# Patient Record
Sex: FEMALE | Race: WHITE | NOT HISPANIC OR LATINO | Employment: FULL TIME | URBAN - METROPOLITAN AREA
[De-identification: names, ages, dates, MRNs, and addresses within clinical notes are randomized per-mention and may not be internally consistent; named-entity substitution may affect disease eponyms.]

---

## 2018-04-16 ENCOUNTER — OFFICE VISIT (OUTPATIENT)
Dept: URGENT CARE | Facility: CLINIC | Age: 28
End: 2018-04-16
Payer: COMMERCIAL

## 2018-04-16 VITALS
HEART RATE: 88 BPM | RESPIRATION RATE: 14 BRPM | HEIGHT: 65 IN | WEIGHT: 215 LBS | BODY MASS INDEX: 35.82 KG/M2 | TEMPERATURE: 98.7 F | OXYGEN SATURATION: 98 % | DIASTOLIC BLOOD PRESSURE: 74 MMHG | SYSTOLIC BLOOD PRESSURE: 126 MMHG

## 2018-04-16 DIAGNOSIS — R10.30 LOWER ABDOMINAL PAIN: ICD-10-CM

## 2018-04-16 DIAGNOSIS — K62.89 RECTAL PAIN: ICD-10-CM

## 2018-04-16 PROCEDURE — 99204 OFFICE O/P NEW MOD 45 MIN: CPT | Performed by: NURSE PRACTITIONER

## 2018-04-16 RX ORDER — SPIRONOLACTONE 25 MG/1
25 TABLET ORAL DAILY
COMMUNITY
End: 2018-08-08

## 2018-04-16 ASSESSMENT — ENCOUNTER SYMPTOMS
FEVER: 0
NAUSEA: 0
ABDOMINAL PAIN: 1
VOMITING: 0
DIARRHEA: 1

## 2018-04-16 NOTE — PROGRESS NOTES
Subjective:      Farideh Cunha is a 27 y.o. female who presents with Abdominal Pain (lower abdominal pain , nausea , diarrhea since this a.m.)    History reviewed. No pertinent past medical history.  Social History     Social History   • Marital status:      Spouse name: N/A   • Number of children: N/A   • Years of education: N/A     Occupational History   • Not on file.     Social History Main Topics   • Smoking status: Never Smoker   • Smokeless tobacco: Never Used   • Alcohol use Not on file   • Drug use: Unknown   • Sexual activity: Not on file     Other Topics Concern   • Not on file     Social History Narrative   • No narrative on file     History reviewed. No pertinent family history.    Allergies: Patient has no allergy information on record.    Patient is a 27-year-old female who presents today with complaint of sudden onset of lower abdominal pain and sharp rectal pain earlier today. Patient was straining to have a bowel movement and states she had sudden sharp pain in the rectal area. She states this pain was a 7/10. Following this she began to have lower abdominal pain that has been consistent since the onset. She does state her pain is now 4/10 and seems to have improved. She denies any nausea or vomiting with this. Patient states that she started on [employer] a few months ago and since then has been having some difficulty with chronic constipation. When she had a bowel movement earlier today, she states she was actually having diarrhea at that time.            Abdominal Pain   This is a new problem. The current episode started today. The onset quality is sudden. The problem occurs constantly. The problem has been gradually improving. Pain location: lower abdomen, rectum. The pain is moderate. The quality of the pain is aching and sharp. The abdominal pain does not radiate. Associated symptoms include diarrhea. Pertinent negatives include no fever, nausea or vomiting. Nothing aggravates the  "pain. The pain is relieved by nothing.       Review of Systems   Constitutional: Negative for fever.   Gastrointestinal: Positive for abdominal pain and diarrhea. Negative for nausea and vomiting.   All other systems reviewed and are negative.         Objective:     /74   Pulse 88   Temp 37.1 °C (98.7 °F)   Resp 14   Ht 1.651 m (5' 5\")   Wt 97.5 kg (215 lb)   LMP 03/20/2018   SpO2 98%   Breastfeeding? No   BMI 35.78 kg/m²      Physical Exam   Constitutional: She is oriented to person, place, and time. She appears well-developed and well-nourished.   Cardiovascular: Normal rate and regular rhythm.    Pulmonary/Chest: Breath sounds normal.   Abdominal: Soft. She exhibits no distension and no mass. There is tenderness. There is no rebound and no guarding. No hernia.   Diffuse tenderness across the lower abdomen, no guarding, no rebound tenderness. Abdomen is soft.   Genitourinary:   Genitourinary Comments: No external hemorrhoids noted no rectal prolapse noted, no fissures or bleeding.   Musculoskeletal: Normal range of motion.   Neurological: She is oriented to person, place, and time.   Skin: Skin is warm and dry. Capillary refill takes less than 2 seconds.   Psychiatric: She has a normal mood and affect. Her behavior is normal. Judgment and thought content normal.     UA: trace leukocytes, negative blood, negative nitrates    Urine hCG: negative           Assessment/Plan:   Lower abdominal pain  Rectal pain   -Patient declined CT and labs at this time; states as she is feeling better she would like to monitor symptoms at home at this point. States she will return to urgent care or go to the emergency room after hours if her symptoms escalate again  -Patient states that she will follow up with me tomorrow morning by phone.   There are no diagnoses linked to this encounter.      "

## 2018-04-17 ENCOUNTER — APPOINTMENT (RX ONLY)
Dept: URBAN - METROPOLITAN AREA CLINIC 4 | Facility: CLINIC | Age: 28
Setting detail: DERMATOLOGY
End: 2018-04-17

## 2018-04-17 DIAGNOSIS — L70.0 ACNE VULGARIS: ICD-10-CM

## 2018-04-17 DIAGNOSIS — L21.8 OTHER SEBORRHEIC DERMATITIS: ICD-10-CM

## 2018-04-17 DIAGNOSIS — L65.0 TELOGEN EFFLUVIUM: ICD-10-CM

## 2018-04-17 PROCEDURE — ? PRESCRIPTION

## 2018-04-17 PROCEDURE — ? TREATMENT REGIMEN

## 2018-04-17 PROCEDURE — 99202 OFFICE O/P NEW SF 15 MIN: CPT

## 2018-04-17 PROCEDURE — ? ADDITIONAL NOTES

## 2018-04-17 PROCEDURE — ? COUNSELING

## 2018-04-17 RX ORDER — TRETINOIN 0.25 MG/G
CREAM TOPICAL QHS
Qty: 45 | Refills: 3 | Status: ERX | COMMUNITY
Start: 2018-04-17

## 2018-04-17 RX ORDER — SPIRONOLACTONE 50 MG/1
TABLET, FILM COATED ORAL
Qty: 60 | Refills: 1 | Status: ERX | COMMUNITY
Start: 2018-04-17

## 2018-04-17 RX ORDER — CLINDAMYCIN PHOSPHATE 10 MG/ML
SOLUTION TOPICAL
Qty: 60 | Refills: 6 | Status: ERX | COMMUNITY
Start: 2018-04-17

## 2018-04-17 RX ADMIN — CLINDAMYCIN PHOSPHATE: 10 SOLUTION TOPICAL at 16:35

## 2018-04-17 RX ADMIN — SPIRONOLACTONE: 50 TABLET, FILM COATED ORAL at 16:36

## 2018-04-17 RX ADMIN — TRETINOIN: 0.25 CREAM TOPICAL at 16:35

## 2018-04-17 ASSESSMENT — LOCATION SIMPLE DESCRIPTION DERM
LOCATION SIMPLE: POSTERIOR SCALP
LOCATION SIMPLE: SUPERIOR FOREHEAD
LOCATION SIMPLE: LEFT TEMPLE
LOCATION SIMPLE: SCALP
LOCATION SIMPLE: LEFT SCALP
LOCATION SIMPLE: INFERIOR FOREHEAD
LOCATION SIMPLE: RIGHT CHEEK
LOCATION SIMPLE: LEFT CHEEK
LOCATION SIMPLE: RIGHT FOREHEAD
LOCATION SIMPLE: RIGHT TEMPLE

## 2018-04-17 ASSESSMENT — LOCATION DETAILED DESCRIPTION DERM
LOCATION DETAILED: LEFT MEDIAL FRONTAL SCALP
LOCATION DETAILED: MID-OCCIPITAL SCALP
LOCATION DETAILED: SUPERIOR MID FOREHEAD
LOCATION DETAILED: LEFT CENTRAL PARIETAL SCALP
LOCATION DETAILED: RIGHT CENTRAL PARIETAL SCALP
LOCATION DETAILED: LEFT SUPERIOR CENTRAL BUCCAL CHEEK
LOCATION DETAILED: RIGHT SUPERIOR TEMPLE
LOCATION DETAILED: LEFT CENTRAL BUCCAL CHEEK
LOCATION DETAILED: LEFT SUPERIOR TEMPLE
LOCATION DETAILED: INFERIOR MID FOREHEAD
LOCATION DETAILED: RIGHT CENTRAL BUCCAL CHEEK
LOCATION DETAILED: RIGHT INFERIOR LATERAL FOREHEAD

## 2018-04-17 ASSESSMENT — LOCATION ZONE DERM
LOCATION ZONE: SCALP
LOCATION ZONE: FACE

## 2018-04-17 NOTE — HPI: SECONDARY COMPLAINT
How Severe Is This Condition?: mild
Additional History: Patient is using ketaconozole shampoo and fluocinonide solution which was given by PCP. Patient states PCP had ordered labs and patient states everything came back normal. Patient also states she has stopped birth control in December and lost 40 lbs as well. Patient is in for further evaluation and management.

## 2018-04-17 NOTE — PROCEDURE: TREATMENT REGIMEN
Notification: Please note that there are new Treatment Regimen plans which have an enhanced patient education handout experience.  The plans are called Treatment Regimen (Acne), Treatment Regimen (Atopic Dermatitis), Treatment Regimen (Rosacea), Treatment Regimen (Sun Protection), Treatment Regimen (Cosmetic Products), and Treatment Regimen (Xerosis).
Initiate Treatment: Wash affected areas with Benzoyl Peroxide each morning, leave on for 2-5 minutes then rinse. Apply topical Clindamycin solution and let dry. Apply daily non-comedogenic, spf moisturizer containing zinc.\\nWash face with gentle Cetaphil cleanser each night. Apply 1/2 pea sized amount of tretinoin 0.05% mixed with nightly non comedogenic moisturizer to entire face.
Detail Level: Zone
Continue Regimen: Spironolactone

## 2018-04-17 NOTE — HPI: PIMPLES (NODULAR ACNE)
How Severe Is Your Nodular Acne?: moderate
Is This A New Presentation, Or A Follow-Up?: Acne
Additional History: Patient was on BC but did not have any results for acne. On Spironolactone 25mg once daily.
Females Only: When Was Your Last Menstrual Period?: 3/23/2018

## 2018-04-17 NOTE — PROCEDURE: ADDITIONAL NOTES
Additional Notes: Patient advised to continue use of ketoconazole shampoo. \\nPatient advised to contact our office or PCP office if seborrheic dermatitis worsens.
Additional Notes: Will obtain labs from [facility]. \\nAdvised patient to reduce mechanical stress on hair.\\nPatient advised to use OTC biotin, may continue nioxin shampoo.
Additional Notes: Side effects of spironolactone reviewed in detail. \\nAdvised patient of the birth defects if patient were to get pregnant. Patient denies current pregnancy or plans for pregnancy. \\nWill obtain lab work from [facility]\\nDiscussed possible accutane or oral antibiotics if patient not happy with results in 2 months. \\nPatient advised to contact our office with any questions and/ or concerns. \\nWill have patient RTC in 2 months for acne recheck.

## 2018-04-17 NOTE — PROCEDURE: COUNSELING
Detail Level: Detailed
Topical Retinoid Pregnancy And Lactation Text: This medication is Pregnancy Category C. It is unknown if this medication is excreted in breast milk.
Tazorac Pregnancy And Lactation Text: This medication is not safe during pregnancy. It is unknown if this medication is excreted in breast milk.
Topical Sulfur Applications Pregnancy And Lactation Text: This medication is Pregnancy Category C and has an unknown safety profile during pregnancy. It is unknown if this topical medication is excreted in breast milk.
Detail Level: Zone
Include Pregnancy/Lactation Warning?: No
Birth Control Pills Pregnancy And Lactation Text: This medication should be avoided if pregnant and for the first 30 days post-partum.
High Dose Vitamin A Counseling: Side effects reviewed, pt to contact office should one occur.
Topical Clindamycin Pregnancy And Lactation Text: This medication is Pregnancy Category B and is considered safe during pregnancy. It is unknown if it is excreted in breast milk.
Benzoyl Peroxide Counseling: Patient counseled that medicine may cause skin irritation and bleach clothing.  In the event of skin irritation, the patient was advised to reduce the amount of the drug applied or use it less frequently.   The patient verbalized understanding of the proper use and possible adverse effects of benzoyl peroxide.  All of the patient's questions and concerns were addressed.
Minocycline Counseling: Patient advised regarding possible photosensitivity and discoloration of the teeth, skin, lips, tongue and gums.  Patient instructed to avoid sunlight, if possible.  When exposed to sunlight, patients should wear protective clothing, sunglasses, and sunscreen.  The patient was instructed to call the office immediately if the following severe adverse effects occur:  hearing changes, easy bruising/bleeding, severe headache, or vision changes.  The patient verbalized understanding of the proper use and possible adverse effects of minocycline.  All of the patient's questions and concerns were addressed.
Dapsone Pregnancy And Lactation Text: This medication is Pregnancy Category C and is not considered safe during pregnancy or breast feeding.
Tetracycline Pregnancy And Lactation Text: This medication is Pregnancy Category D and not consider safe during pregnancy. It is also excreted in breast milk.
Tetracycline Counseling: Patient counseled regarding possible photosensitivity and increased risk for sunburn.  Patient instructed to avoid sunlight, if possible.  When exposed to sunlight, patients should wear protective clothing, sunglasses, and sunscreen.  The patient was instructed to call the office immediately if the following severe adverse effects occur:  hearing changes, easy bruising/bleeding, severe headache, or vision changes.  The patient verbalized understanding of the proper use and possible adverse effects of tetracycline.  All of the patient's questions and concerns were addressed. Patient understands to avoid pregnancy while on therapy due to potential birth defects.
Erythromycin Pregnancy And Lactation Text: This medication is Pregnancy Category B and is considered safe during pregnancy. It is also excreted in breast milk.
Erythromycin Counseling:  I discussed with the patient the risks of erythromycin including but not limited to GI upset, allergic reaction, drug rash, diarrhea, increase in liver enzymes, and yeast infections.
Isotretinoin Pregnancy And Lactation Text: This medication is Pregnancy Category X and is considered extremely dangerous during pregnancy. It is unknown if it is excreted in breast milk.
Doxycycline Pregnancy And Lactation Text: This medication is Pregnancy Category D and not consider safe during pregnancy. It is also excreted in breast milk but is considered safe for shorter treatment courses.
Topical Retinoid counseling:  Patient advised to apply a pea-sized amount only at bedtime and wait 30 minutes after washing their face before applying.  If too drying, patient may add a non-comedogenic moisturizer. The patient verbalized understanding of the proper use and possible adverse effects of retinoids.  All of the patient's questions and concerns were addressed.
Bactrim Counseling:  I discussed with the patient the risks of sulfa antibiotics including but not limited to GI upset, allergic reaction, drug rash, diarrhea, dizziness, photosensitivity, and yeast infections.  Rarely, more serious reactions can occur including but not limited to aplastic anemia, agranulocytosis, methemoglobinemia, blood dyscrasias, liver or kidney failure, lung infiltrates or desquamative/blistering drug rashes.
High Dose Vitamin A Pregnancy And Lactation Text: High dose vitamin A therapy is contraindicated during pregnancy and breast feeding.
Spironolactone Pregnancy And Lactation Text: This medication can cause feminization of the male fetus and should be avoided during pregnancy. The active metabolite is also found in breast milk.
Spironolactone Counseling: Patient advised regarding risks of diarrhea, abdominal pain, hyperkalemia, birth defects (for female patients), liver toxicity and renal toxicity. The patient may need blood work to monitor liver and kidney function and potassium levels while on therapy. The patient verbalized understanding of the proper use and possible adverse effects of spironolactone.  All of the patient's questions and concerns were addressed.
Tazorac Counseling:  Patient advised that medication is irritating and drying.  Patient may need to apply sparingly and wash off after an hour before eventually leaving it on overnight.  The patient verbalized understanding of the proper use and possible adverse effects of tazorac.  All of the patient's questions and concerns were addressed.
Birth Control Pills Counseling: Birth Control Pill Counseling: I discussed with the patient the potential side effects of OCPs including but not limited to increased risk of stroke, heart attack, thrombophlebitis, deep venous thrombosis, hepatic adenomas, breast changes, GI upset, headaches, and depression.  The patient verbalized understanding of the proper use and possible adverse effects of OCPs. All of the patient's questions and concerns were addressed.
Azithromycin Pregnancy And Lactation Text: This medication is considered safe during pregnancy and is also secreted in breast milk.
Dapsone Counseling: I discussed with the patient the risks of dapsone including but not limited to hemolytic anemia, agranulocytosis, rashes, methemoglobinemia, kidney failure, peripheral neuropathy, headaches, GI upset, and liver toxicity.  Patients who start dapsone require monitoring including baseline LFTs and weekly CBCs for the first month, then every month thereafter.  The patient verbalized understanding of the proper use and possible adverse effects of dapsone.  All of the patient's questions and concerns were addressed.
Benzoyl Peroxide Pregnancy And Lactation Text: This medication is Pregnancy Category C. It is unknown if benzoyl peroxide is excreted in breast milk.
Azithromycin Counseling:  I discussed with the patient the risks of azithromycin including but not limited to GI upset, allergic reaction, drug rash, diarrhea, and yeast infections.
Topical Sulfur Applications Counseling: Topical Sulfur Counseling: Patient counseled that this medication may cause skin irritation or allergic reactions.  In the event of skin irritation, the patient was advised to reduce the amount of the drug applied or use it less frequently.   The patient verbalized understanding of the proper use and possible adverse effects of topical sulfur application.  All of the patient's questions and concerns were addressed.
Isotretinoin Counseling: Patient should get monthly blood tests, not donate blood, not drive at night if vision affected, not share medication, and not undergo elective surgery for 6 months after tx completed. Side effects reviewed, pt to contact office should one occur.
Bactrim Pregnancy And Lactation Text: This medication is Pregnancy Category D and is known to cause fetal risk.  It is also excreted in breast milk.
Doxycycline Counseling:  Patient counseled regarding possible photosensitivity and increased risk for sunburn.  Patient instructed to avoid sunlight, if possible.  When exposed to sunlight, patients should wear protective clothing, sunglasses, and sunscreen.  The patient was instructed to call the office immediately if the following severe adverse effects occur:  hearing changes, easy bruising/bleeding, severe headache, or vision changes.  The patient verbalized understanding of the proper use and possible adverse effects of doxycycline.  All of the patient's questions and concerns were addressed.
Topical Clindamycin Counseling: Patient counseled that this medication may cause skin irritation or allergic reactions.  In the event of skin irritation, the patient was advised to reduce the amount of the drug applied or use it less frequently.   The patient verbalized understanding of the proper use and possible adverse effects of clindamycin.  All of the patient's questions and concerns were addressed.

## 2018-05-20 ENCOUNTER — APPOINTMENT (OUTPATIENT)
Dept: RADIOLOGY | Facility: MEDICAL CENTER | Age: 28
End: 2018-05-20
Attending: EMERGENCY MEDICINE
Payer: COMMERCIAL

## 2018-05-20 ENCOUNTER — HOSPITAL ENCOUNTER (EMERGENCY)
Facility: MEDICAL CENTER | Age: 28
End: 2018-05-20
Attending: EMERGENCY MEDICINE
Payer: COMMERCIAL

## 2018-05-20 ENCOUNTER — HOSPITAL ENCOUNTER (EMERGENCY)
Facility: MEDICAL CENTER | Age: 28
End: 2018-05-20
Payer: COMMERCIAL

## 2018-05-20 VITALS
RESPIRATION RATE: 16 BRPM | HEART RATE: 81 BPM | SYSTOLIC BLOOD PRESSURE: 110 MMHG | TEMPERATURE: 97.2 F | DIASTOLIC BLOOD PRESSURE: 87 MMHG | OXYGEN SATURATION: 97 % | WEIGHT: 216.71 LBS | BODY MASS INDEX: 36.11 KG/M2 | HEIGHT: 65 IN

## 2018-05-20 DIAGNOSIS — N12 PYELONEPHRITIS: ICD-10-CM

## 2018-05-20 LAB
ANION GAP SERPL CALC-SCNC: 7 MMOL/L (ref 0–11.9)
APPEARANCE UR: CLEAR
BASOPHILS # BLD AUTO: 0.4 % (ref 0–1.8)
BASOPHILS # BLD: 0.02 K/UL (ref 0–0.12)
BILIRUB UR QL STRIP.AUTO: NEGATIVE
BUN SERPL-MCNC: 8 MG/DL (ref 8–22)
CALCIUM SERPL-MCNC: 10.1 MG/DL (ref 8.5–10.5)
CHLORIDE SERPL-SCNC: 106 MMOL/L (ref 96–112)
CO2 SERPL-SCNC: 25 MMOL/L (ref 20–33)
COLOR UR: YELLOW
CREAT SERPL-MCNC: 0.65 MG/DL (ref 0.5–1.4)
EOSINOPHIL # BLD AUTO: 0.1 K/UL (ref 0–0.51)
EOSINOPHIL NFR BLD: 1.8 % (ref 0–6.9)
ERYTHROCYTE [DISTWIDTH] IN BLOOD BY AUTOMATED COUNT: 41.8 FL (ref 35.9–50)
GLUCOSE SERPL-MCNC: 91 MG/DL (ref 65–99)
GLUCOSE UR STRIP.AUTO-MCNC: NEGATIVE MG/DL
HCG SERPL QL: NEGATIVE
HCT VFR BLD AUTO: 44.9 % (ref 37–47)
HGB BLD-MCNC: 15.1 G/DL (ref 12–16)
IMM GRANULOCYTES # BLD AUTO: 0.01 K/UL (ref 0–0.11)
IMM GRANULOCYTES NFR BLD AUTO: 0.2 % (ref 0–0.9)
KETONES UR STRIP.AUTO-MCNC: NEGATIVE MG/DL
LEUKOCYTE ESTERASE UR QL STRIP.AUTO: NEGATIVE
LYMPHOCYTES # BLD AUTO: 1.84 K/UL (ref 1–4.8)
LYMPHOCYTES NFR BLD: 33.2 % (ref 22–41)
MCH RBC QN AUTO: 28.7 PG (ref 27–33)
MCHC RBC AUTO-ENTMCNC: 33.6 G/DL (ref 33.6–35)
MCV RBC AUTO: 85.4 FL (ref 81.4–97.8)
MICRO URNS: NORMAL
MONOCYTES # BLD AUTO: 0.38 K/UL (ref 0–0.85)
MONOCYTES NFR BLD AUTO: 6.9 % (ref 0–13.4)
NEUTROPHILS # BLD AUTO: 3.19 K/UL (ref 2–7.15)
NEUTROPHILS NFR BLD: 57.5 % (ref 44–72)
NITRITE UR QL STRIP.AUTO: NEGATIVE
NRBC # BLD AUTO: 0 K/UL
NRBC BLD-RTO: 0 /100 WBC
PH UR STRIP.AUTO: 7 [PH]
PLATELET # BLD AUTO: 229 K/UL (ref 164–446)
PMV BLD AUTO: 9 FL (ref 9–12.9)
POTASSIUM SERPL-SCNC: 4 MMOL/L (ref 3.6–5.5)
PROT UR QL STRIP: NEGATIVE MG/DL
RBC # BLD AUTO: 5.26 M/UL (ref 4.2–5.4)
RBC UR QL AUTO: NEGATIVE
SODIUM SERPL-SCNC: 138 MMOL/L (ref 135–145)
SP GR UR STRIP.AUTO: 1
UROBILINOGEN UR STRIP.AUTO-MCNC: 0.2 MG/DL
WBC # BLD AUTO: 5.5 K/UL (ref 4.8–10.8)

## 2018-05-20 PROCEDURE — 96374 THER/PROPH/DIAG INJ IV PUSH: CPT

## 2018-05-20 PROCEDURE — 84703 CHORIONIC GONADOTROPIN ASSAY: CPT

## 2018-05-20 PROCEDURE — 81003 URINALYSIS AUTO W/O SCOPE: CPT

## 2018-05-20 PROCEDURE — 76775 US EXAM ABDO BACK WALL LIM: CPT

## 2018-05-20 PROCEDURE — 87086 URINE CULTURE/COLONY COUNT: CPT

## 2018-05-20 PROCEDURE — 36415 COLL VENOUS BLD VENIPUNCTURE: CPT

## 2018-05-20 PROCEDURE — 99285 EMERGENCY DEPT VISIT HI MDM: CPT

## 2018-05-20 PROCEDURE — 700111 HCHG RX REV CODE 636 W/ 250 OVERRIDE (IP): Performed by: EMERGENCY MEDICINE

## 2018-05-20 PROCEDURE — 85025 COMPLETE CBC W/AUTO DIFF WBC: CPT

## 2018-05-20 PROCEDURE — 80048 BASIC METABOLIC PNL TOTAL CA: CPT

## 2018-05-20 RX ORDER — ONDANSETRON 4 MG/1
4 TABLET, ORALLY DISINTEGRATING ORAL EVERY 8 HOURS PRN
Qty: 10 TAB | Refills: 0 | Status: SHIPPED | OUTPATIENT
Start: 2018-05-20 | End: 2018-08-08

## 2018-05-20 RX ORDER — HYDROCODONE BITARTRATE AND ACETAMINOPHEN 5; 325 MG/1; MG/1
1-2 TABLET ORAL EVERY 4 HOURS PRN
Qty: 15 TAB | Refills: 0 | Status: SHIPPED | OUTPATIENT
Start: 2018-05-20 | End: 2018-05-23

## 2018-05-20 RX ORDER — AMOXICILLIN AND CLAVULANATE POTASSIUM 875; 125 MG/1; MG/1
1 TABLET, FILM COATED ORAL 2 TIMES DAILY
Qty: 6 TAB | Refills: 0 | Status: SHIPPED | OUTPATIENT
Start: 2018-05-20 | End: 2018-08-08

## 2018-05-20 RX ORDER — ONDANSETRON 2 MG/ML
4 INJECTION INTRAMUSCULAR; INTRAVENOUS
Status: DISCONTINUED | OUTPATIENT
Start: 2018-05-20 | End: 2018-05-20 | Stop reason: HOSPADM

## 2018-05-20 RX ADMIN — ONDANSETRON 4 MG: 2 INJECTION INTRAMUSCULAR; INTRAVENOUS at 10:13

## 2018-05-20 ASSESSMENT — PAIN SCALES - GENERAL: PAINLEVEL_OUTOF10: 3

## 2018-05-20 NOTE — ED TRIAGE NOTES
Amb to triage w/ c/o B flank pain, nausea & uti symptoms.  Pt has been treated for a UTI x 2.5 weeks w/ 5 different antibiotics.  Pt reports feeling worse w/ chills & shakiness.

## 2018-05-20 NOTE — ED PROVIDER NOTES
ED Provider Note    CHIEF COMPLAINT  Chief Complaint   Patient presents with   • Flank Pain     B flank x 3 days   • Nausea   • UTI       HPI  Farideh Cunha is a 27 y.o. female who presents for worsening bilateral flank pain. Flank pain started 2 days ago. She's had urinary tract infection symptoms variably for the last 2-1/2 weeks. She completed a course of Macrobid with no improvement. This was followed by Bactrim which caused allergic swelling of the throat and was discontinued after 2 days. This was followed by a full course of Keflex with transient improvement although her symptoms worsened the last day. She was switched to Cipro but had an allergy to Cipro and this was discontinued. Friday she was started on Augmentin of which she has taken 3 doeses although she has not taken her morning dose. She denies fever. She has urgency and frequency. No history of diabetes or immune compromise. History of frequent UTIs. No known kidney stones or hematuria.    REVIEW OF SYSTEMS  Pertinent positives include: Flank pain, urgency, frequency, severe nausea.  Pertinent negatives include: Vomiting, diarrhea, pregnancy, leg swelling, rashes, cough, sore throat.  10+ systems reviewed and negative.      PAST MEDICAL HISTORY  Recurrent UTI    SOCIAL HISTORY  Social History   Substance Use Topics   • Smoking status: Never Smoker   • Smokeless tobacco: Never Used   • Alcohol use No     History   Drug Use No         CURRENT MEDICATIONS  Current Facility-Administered Medications   Medication Dose Route Frequency Provider Last Rate Last Dose     Current Outpatient Prescriptions   Medication Sig Dispense Refill   • OMEPRAZOLE PO Take  by mouth.     • spironolactone (ALDACTONE) 25 MG Tab Take 25 mg by mouth every day.     • Multiple Vitamin (MULTI-VITAMIN PO) Take  by mouth.     • Cholecalciferol (VITAMIN D PO) Take  by mouth.     • BIOTIN PO Take  by mouth.     • Probiotic Product (PROBIOTIC PO) Take  by mouth.    "  Augmentin    ALLERGIES  Allergies   Allergen Reactions   • Bactrim [Sulfamethoxazole W-Trimethoprim]    • Ciprofloxacin        PHYSICAL EXAM  VITAL SIGNS: /77   Pulse (!) 107   Temp 36.2 °C (97.2 °F)   Resp 16   Ht 1.651 m (5' 5\")   Wt 98.3 kg (216 lb 11.4 oz)   SpO2 98%   BMI 36.06 kg/m²   Reviewed and hypertensive, tachycardic, afebrile.  Constitutional: Well developed, Well nourished, moderately overweight.  HENT: Normocephalic, atraumatic, bilateral external ears normal, oropharynx moist, No exudates or erythema.   Eyes: PERRLA, conjunctiva pink, no scleral icterus.   Cardiovascular: Regular S1-S2 without murmur, rub, gallop.  Respiratory: No rales, rhonchi, wheeze.  Gastrointestinal: Soft, nontender, nondistended.  Skin: No erythema, no rash.   Genitourinary: Mild bilateral costovertebral angle tenderness.   Neurologic: Alert & oriented x 3, cranial nerves 2-12 intact by passive exam.  No focal deficit noted.  Psychiatric: Affect normal, Judgment normal, Mood normal.     DIFFERENTIAL DIAGNOSIS:  Pyelonephritis, cystitis, sepsis, renal colic, renal insufficiency, diabetes.    RADIOLOGY/PROCEDURES  US-RENAL   Final Result      No hydronephrosis or renal calculi.          LABORATORY:  Results for orders placed or performed during the hospital encounter of 05/20/18   CBC WITH DIFFERENTIAL   Result Value Ref Range    WBC 5.5 4.8 - 10.8 K/uL    RBC 5.26 4.20 - 5.40 M/uL    Hemoglobin 15.1 12.0 - 16.0 g/dL    Hematocrit 44.9 37.0 - 47.0 %    MCV 85.4 81.4 - 97.8 fL    MCH 28.7 27.0 - 33.0 pg    MCHC 33.6 33.6 - 35.0 g/dL    RDW 41.8 35.9 - 50.0 fL    Platelet Count 229 164 - 446 K/uL    MPV 9.0 9.0 - 12.9 fL    Neutrophils-Polys 57.50 44.00 - 72.00 %    Lymphocytes 33.20 22.00 - 41.00 %    Monocytes 6.90 0.00 - 13.40 %    Eosinophils 1.80 0.00 - 6.90 %    Basophils 0.40 0.00 - 1.80 %    Immature Granulocytes 0.20 0.00 - 0.90 %    Nucleated RBC 0.00 /100 WBC    Neutrophils (Absolute) 3.19 2.00 - 7.15 " K/uL    Lymphs (Absolute) 1.84 1.00 - 4.80 K/uL    Monos (Absolute) 0.38 0.00 - 0.85 K/uL    Eos (Absolute) 0.10 0.00 - 0.51 K/uL    Baso (Absolute) 0.02 0.00 - 0.12 K/uL    Immature Granulocytes (abs) 0.01 0.00 - 0.11 K/uL    NRBC (Absolute) 0.00 K/uL   BASIC METABOLIC PANEL   Result Value Ref Range    Sodium 138 135 - 145 mmol/L    Potassium 4.0 3.6 - 5.5 mmol/L    Chloride 106 96 - 112 mmol/L    Co2 25 20 - 33 mmol/L    Glucose 91 65 - 99 mg/dL    Bun 8 8 - 22 mg/dL    Creatinine 0.65 0.50 - 1.40 mg/dL    Calcium 10.1 8.5 - 10.5 mg/dL    Anion Gap 7.0 0.0 - 11.9   HCG QUAL SERUM   Result Value Ref Range    Beta-Hcg Qualitative Serum Negative Negative   URINALYSIS   Result Value Ref Range    Color Yellow     Character Clear     Specific Gravity 1.005 <1.035    Ph 7.0 5.0 - 8.0    Glucose Negative Negative mg/dL    Ketones Negative Negative mg/dL    Protein Negative Negative mg/dL    Bilirubin Negative Negative    Urobilinogen, Urine 0.2 Negative    Nitrite Negative Negative    Leukocyte Esterase Negative Negative    Occult Blood Negative Negative    Micro Urine Req see below        INTERVENTIONS:  Medications   ondansetron (ZOFRAN) syringe/vial injection 4 mg (4 mg Intravenous Given 5/20/18 1013)   History is discussed with Dr. Sandhu infectious disease who recommended 10 days of Augmentin and follow up if symptoms return    COURSE & MEDICAL DECISION MAKING  This patient presents with probable pyelonephritis. She's received multiple antibiotics and has no urine cultures to guide therapy. Results obtained from [facility] and urogenital [facility] were all [place]. There is no evidence of sepsis or diabetes renal insufficiency nor hydronephrosis or ureteral calculi. There is no pregnancy..    PLAN:  Discharge Medication List as of 5/20/2018 12:33 PM      START taking these medications    Details   amoxicillin-clavulanate (AUGMENTIN) 875-125 MG Tab Take 1 Tab by mouth 2 times a day., Disp-6 Tab, R-0, Print Rx Paper       HYDROcodone-acetaminophen (NORCO) 5-325 MG Tab per tablet Take 1-2 Tabs by mouth every four hours as needed (mild pain) for up to 3 days., Disp-15 Tab, R-0, Print Rx Paper, For 3 days      ondansetron (ZOFRAN ODT) 4 MG TABLET DISPERSIBLE Take 1 Tab by mouth every 8 hours as needed., Disp-10 Tab, R-0, Print Rx Paper           Increased Augmentin supply to complete 10 day course  Prescription monitoring accessed and patient low risk  Opiate risk tool utilized  Informed consent obtained for opiate analgesics  Pyelonephritis handout given  Return for uncontrolled vomiting, dizziness persistent fevers, severe pain    [facility], Emergency Dept  [address] [facility] [address] [phone]        Gabriela Sandhu M.D.  [address] [place] [id number] [phone]    Schedule an appointment as soon as possible for a visit  As needed      CONDITION: Stable.    FINAL IMPRESSION  1. Pyelonephritis          Electronically signed by: Jatin Voss, 5/20/2018 9:57 AM

## 2018-05-20 NOTE — ED NOTES
Discharge instructions given. All questions answered. Prescriptions given x3. Pt to follow-up with ID. Pt verbalized understanding. All belongings with pt. Pt self ambulatory to lobby. Pt educated not to drive while on narcotics.

## 2018-05-20 NOTE — ED NOTES
[place] 1 Fall Risk Assessment completed and in patient chart. Appropriate interventions in place.

## 2018-05-20 NOTE — DISCHARGE INSTRUCTIONS
Pyelonephritis  (Urinary Tract Infection Involving the Kidney)    Finished 10 days of Augmentin..  Take ibuprofen for fever and hydrocodone for pain. Use Zofran for nausea. Return for uncontrolled vomiting, ill appearance or dizziness with standing.  See your doctor, Dr. Sandhu infectious disease, or return to the ER if not better after the antibiotics.    You had a borderline or high normal blood pressure reading today.  This does not necessarily mean you have hypertension.  Please followup with your/a primary physician for comprehensive blood pressure evaluation and yearly fasting cholesterol assessment.  BP Readings from Last 3 Encounters:   05/20/18 142/77   04/16/18 126/74         Pyelonephritis is an inflammation (soreness) of the kidney. It is generally caused by infection. It usually affects only one kidney. It may affect both. Usually these kidney infections have spread from the lower urinary tract. Because the urethra (the opening to the bladder) is much shorter in females than in males, urinary tract infections are much more common in females.    SYMPTOMS (what seems to be the problem)  Usually infections of the kidney have an abrupt onset of chills and fever. There is often an ache in the flank (the back near the lower ribs). There is often a generalized malaise. There may be nausea (feeling sick to your stomach) and vomiting. Some times there are symptoms (problems) of cystitis (bladder infection and inflammation). These symptoms often include urgency, increased frequency of urination, and burning with urination.    Pyelonephritis will usually respond to antibiotics (medications that kill bacteria). When this illness is recognized and treated promptly, complications are not common. Hospitalization may be required. If treated at home, take all the medicine given to you until finished. You may feel better in a few days, but TAKE ALL THE MEDICINE or the infection may not respond. It can become more  difficult to treat. Response can generally be expected in 7 to 10 days.    Drink lots of fluid, 3 to 4 quarts a day. Cranberry juice is especially recommended, in addition to large amounts of water. Avoid caffeine, tea and carbonated beverages (Coke®, 7-Up®, etc.), because they tend to irritate the bladder. Males should avoid alcohol as this may irritate the prostate. Aspirin, ibuprofen (Advil® or Motrin®) or acetaminophen (Tylenol®) may be used for temperature and/or discomfort. Only use these if your caregiver has not given medications that interfere.    TO PREVENT FURTHER INFECTIONS:  Ø Empty the bladder often. Avoid holding urine for long periods of time.  Ø After a bowel movement, women should cleanse front to back. Only use each tissue once.  Ø Empty the bladder before and after sexual intercourse.    If you develop an increase of back pain, fever, nausea, vomiting, or your symptoms are no better in three (3) days, seek medical attention. Seek medical attention sooner if you are getting worse.    Document Released: 12/18/2006    [employer]® Patient Information ©2008 [employer].

## 2018-05-22 LAB
BACTERIA UR CULT: NORMAL
SIGNIFICANT IND 70042: NORMAL
SITE SITE: NORMAL
SOURCE SOURCE: NORMAL

## 2018-06-20 ENCOUNTER — HOSPITAL ENCOUNTER (OUTPATIENT)
Dept: LAB | Facility: MEDICAL CENTER | Age: 28
End: 2018-06-20
Attending: SPECIALIST
Payer: COMMERCIAL

## 2018-06-20 PROCEDURE — 87086 URINE CULTURE/COLONY COUNT: CPT

## 2018-06-20 PROCEDURE — 88175 CYTOPATH C/V AUTO FLUID REDO: CPT

## 2018-06-20 PROCEDURE — 87591 N.GONORRHOEAE DNA AMP PROB: CPT

## 2018-06-20 PROCEDURE — 87491 CHLMYD TRACH DNA AMP PROBE: CPT

## 2018-06-21 LAB — CYTOLOGY REG CYTOL: NORMAL

## 2018-06-22 LAB
C TRACH DNA SPEC QL NAA+PROBE: NEGATIVE
N GONORRHOEA DNA SPEC QL NAA+PROBE: NEGATIVE
SPECIMEN SOURCE: NORMAL

## 2018-06-23 LAB
BACTERIA UR CULT: NORMAL
SIGNIFICANT IND 70042: NORMAL
SITE SITE: NORMAL
SOURCE SOURCE: NORMAL

## 2018-06-28 ENCOUNTER — HOSPITAL ENCOUNTER (OUTPATIENT)
Dept: LAB | Facility: MEDICAL CENTER | Age: 28
End: 2018-06-28
Attending: UROLOGY
Payer: COMMERCIAL

## 2018-06-28 LAB — AMBIGUOUS DTTM AMBI4: NORMAL

## 2018-07-02 ENCOUNTER — HOSPITAL ENCOUNTER (OUTPATIENT)
Dept: LAB | Facility: MEDICAL CENTER | Age: 28
End: 2018-07-02
Attending: UROLOGY
Payer: COMMERCIAL

## 2018-07-02 PROCEDURE — 87086 URINE CULTURE/COLONY COUNT: CPT

## 2018-07-03 LAB
AMBIGUOUS DTTM AMBI4: NORMAL
SIGNIFICANT IND 70042: NORMAL
SITE SITE: NORMAL
SOURCE SOURCE: NORMAL

## 2018-07-05 LAB
BACTERIA UR CULT: NORMAL
SIGNIFICANT IND 70042: NORMAL
SITE SITE: NORMAL
SOURCE SOURCE: NORMAL

## 2018-07-10 ENCOUNTER — HOSPITAL ENCOUNTER (OUTPATIENT)
Dept: LAB | Facility: MEDICAL CENTER | Age: 28
End: 2018-07-10
Attending: OBSTETRICS & GYNECOLOGY
Payer: COMMERCIAL

## 2018-07-10 PROCEDURE — 81001 URINALYSIS AUTO W/SCOPE: CPT

## 2018-07-10 PROCEDURE — 87086 URINE CULTURE/COLONY COUNT: CPT

## 2018-07-12 LAB
APPEARANCE UR: CLEAR
BACTERIA #/AREA URNS HPF: NEGATIVE /HPF
BILIRUB UR QL STRIP.AUTO: NEGATIVE
COLOR UR: YELLOW
EPI CELLS #/AREA URNS HPF: ABNORMAL /HPF
GLUCOSE UR STRIP.AUTO-MCNC: NEGATIVE MG/DL
HYALINE CASTS #/AREA URNS LPF: ABNORMAL /LPF
KETONES UR STRIP.AUTO-MCNC: NEGATIVE MG/DL
LEUKOCYTE ESTERASE UR QL STRIP.AUTO: NEGATIVE
MICRO URNS: ABNORMAL
NITRITE UR QL STRIP.AUTO: NEGATIVE
PH UR STRIP.AUTO: 6 [PH]
PROT UR QL STRIP: NEGATIVE MG/DL
RBC # URNS HPF: ABNORMAL /HPF
RBC UR QL AUTO: ABNORMAL
SP GR UR STRIP.AUTO: 1.01
UROBILINOGEN UR STRIP.AUTO-MCNC: 0.2 MG/DL
WBC #/AREA URNS HPF: ABNORMAL /HPF

## 2018-07-14 LAB
BACTERIA UR CULT: NORMAL
SIGNIFICANT IND 70042: NORMAL
SITE SITE: NORMAL
SOURCE SOURCE: NORMAL

## 2018-07-31 ENCOUNTER — HOSPITAL ENCOUNTER (OUTPATIENT)
Dept: RADIOLOGY | Facility: MEDICAL CENTER | Age: 28
End: 2018-07-31
Attending: NURSE PRACTITIONER
Payer: COMMERCIAL

## 2018-07-31 DIAGNOSIS — N39.0 URINARY TRACT INFECTION WITHOUT HEMATURIA, SITE UNSPECIFIED: ICD-10-CM

## 2018-07-31 PROCEDURE — 36569 INSJ PICC 5 YR+ W/O IMAGING: CPT

## 2018-07-31 NOTE — PROGRESS NOTES
PICC Insertion Procedure Note    Consents confirmed, vessel patency confirmed with ultrasound, real time ultrasound image printed and placed in patient chart. Risks and benefits of procedure explained to patient/family and education regarding central line associated bloodstream infections provided. Questions answered.     PICC placed in RUE per MD order with ultrasound guidance. 4 Fr, single lumen PICC placed in basilic vein after 1 attempt(s). 2.5 cc's of 1% lidocaine injected intradermally, 21 gauge microintroducer needle and modified Seldinger technique used. 44 cm total catheter length, 0 cm external measurement inserted with good blood return. Each lumen flushed without resistance with 10 mL 0.9% normal saline. PICC line secured with Biopatch and Tegaderm. Right arm circumference at picc site is 35 cm.    PICC tip location in the SVC confirmed by ECG technology. Pt tolerated procedure well.  Patient condition relayed to unit RN or ordering physician via this post procedure note in the EMR.     iLink Power PICC ref # [id number], Lot # [id number]

## 2018-08-07 ENCOUNTER — HOSPITAL ENCOUNTER (OUTPATIENT)
Dept: LAB | Facility: MEDICAL CENTER | Age: 28
End: 2018-08-07
Payer: COMMERCIAL

## 2018-08-07 LAB
ALBUMIN SERPL BCP-MCNC: 4.6 G/DL (ref 3.2–4.9)
ALBUMIN/GLOB SERPL: 1.8 G/DL
ALP SERPL-CCNC: 88 U/L (ref 30–99)
ALT SERPL-CCNC: 14 U/L (ref 2–50)
ANION GAP SERPL CALC-SCNC: 9 MMOL/L (ref 0–11.9)
AST SERPL-CCNC: 15 U/L (ref 12–45)
BASOPHILS # BLD AUTO: 0.5 % (ref 0–1.8)
BASOPHILS # BLD: 0.03 K/UL (ref 0–0.12)
BILIRUB SERPL-MCNC: 0.4 MG/DL (ref 0.1–1.5)
BUN SERPL-MCNC: 10 MG/DL (ref 8–22)
CALCIUM SERPL-MCNC: 9.1 MG/DL (ref 8.5–10.5)
CHLORIDE SERPL-SCNC: 107 MMOL/L (ref 96–112)
CO2 SERPL-SCNC: 23 MMOL/L (ref 20–33)
CREAT SERPL-MCNC: 0.52 MG/DL (ref 0.5–1.4)
EOSINOPHIL # BLD AUTO: 0.11 K/UL (ref 0–0.51)
EOSINOPHIL NFR BLD: 1.8 % (ref 0–6.9)
ERYTHROCYTE [DISTWIDTH] IN BLOOD BY AUTOMATED COUNT: 44.4 FL (ref 35.9–50)
GLOBULIN SER CALC-MCNC: 2.6 G/DL (ref 1.9–3.5)
GLUCOSE SERPL-MCNC: 109 MG/DL (ref 65–99)
HCT VFR BLD AUTO: 41.8 % (ref 37–47)
HGB BLD-MCNC: 13.4 G/DL (ref 12–16)
IMM GRANULOCYTES # BLD AUTO: 0.02 K/UL (ref 0–0.11)
IMM GRANULOCYTES NFR BLD AUTO: 0.3 % (ref 0–0.9)
LYMPHOCYTES # BLD AUTO: 2.17 K/UL (ref 1–4.8)
LYMPHOCYTES NFR BLD: 35.9 % (ref 22–41)
MCH RBC QN AUTO: 28.8 PG (ref 27–33)
MCHC RBC AUTO-ENTMCNC: 32.1 G/DL (ref 33.6–35)
MCV RBC AUTO: 89.7 FL (ref 81.4–97.8)
MONOCYTES # BLD AUTO: 0.31 K/UL (ref 0–0.85)
MONOCYTES NFR BLD AUTO: 5.1 % (ref 0–13.4)
NEUTROPHILS # BLD AUTO: 3.4 K/UL (ref 2–7.15)
NEUTROPHILS NFR BLD: 56.4 % (ref 44–72)
NRBC # BLD AUTO: 0 K/UL
NRBC BLD-RTO: 0 /100 WBC
PLATELET # BLD AUTO: 224 K/UL (ref 164–446)
PMV BLD AUTO: 9.7 FL (ref 9–12.9)
POTASSIUM SERPL-SCNC: 3.9 MMOL/L (ref 3.6–5.5)
PROT SERPL-MCNC: 7.2 G/DL (ref 6–8.2)
RBC # BLD AUTO: 4.66 M/UL (ref 4.2–5.4)
SODIUM SERPL-SCNC: 139 MMOL/L (ref 135–145)
WBC # BLD AUTO: 6 K/UL (ref 4.8–10.8)

## 2018-08-07 PROCEDURE — 85025 COMPLETE CBC W/AUTO DIFF WBC: CPT

## 2018-08-07 PROCEDURE — 80053 COMPREHEN METABOLIC PANEL: CPT

## 2018-08-08 ENCOUNTER — HOSPITAL ENCOUNTER (EMERGENCY)
Facility: MEDICAL CENTER | Age: 28
End: 2018-08-08
Attending: EMERGENCY MEDICINE
Payer: COMMERCIAL

## 2018-08-08 ENCOUNTER — APPOINTMENT (OUTPATIENT)
Dept: MEDICAL GROUP | Facility: PHYSICIAN GROUP | Age: 28
End: 2018-08-08
Payer: COMMERCIAL

## 2018-08-08 VITALS
TEMPERATURE: 98.8 F | BODY MASS INDEX: 37.13 KG/M2 | HEART RATE: 88 BPM | WEIGHT: 222.88 LBS | SYSTOLIC BLOOD PRESSURE: 132 MMHG | DIASTOLIC BLOOD PRESSURE: 80 MMHG | HEIGHT: 65 IN | RESPIRATION RATE: 16 BRPM

## 2018-08-08 DIAGNOSIS — I82.621 ACUTE DEEP VEIN THROMBOSIS (DVT) OF OTHER VEIN OF RIGHT UPPER EXTREMITY (HCC): ICD-10-CM

## 2018-08-08 LAB
ANION GAP SERPL CALC-SCNC: 8 MMOL/L (ref 0–11.9)
APPEARANCE UR: CLEAR
BASOPHILS # BLD AUTO: 0.6 % (ref 0–1.8)
BASOPHILS # BLD: 0.03 K/UL (ref 0–0.12)
BILIRUB UR QL STRIP.AUTO: NEGATIVE
BUN SERPL-MCNC: 8 MG/DL (ref 8–22)
CALCIUM SERPL-MCNC: 9.2 MG/DL (ref 8.4–10.2)
CHLORIDE SERPL-SCNC: 106 MMOL/L (ref 96–112)
CO2 SERPL-SCNC: 22 MMOL/L (ref 20–33)
COLOR UR: YELLOW
CREAT SERPL-MCNC: 0.57 MG/DL (ref 0.5–1.4)
EOSINOPHIL # BLD AUTO: 0.1 K/UL (ref 0–0.51)
EOSINOPHIL NFR BLD: 1.9 % (ref 0–6.9)
ERYTHROCYTE [DISTWIDTH] IN BLOOD BY AUTOMATED COUNT: 41.1 FL (ref 35.9–50)
GLUCOSE SERPL-MCNC: 89 MG/DL (ref 65–99)
GLUCOSE UR STRIP.AUTO-MCNC: NEGATIVE MG/DL
HCT VFR BLD AUTO: 39.4 % (ref 37–47)
HGB BLD-MCNC: 13.2 G/DL (ref 12–16)
IMM GRANULOCYTES # BLD AUTO: 0.01 K/UL (ref 0–0.11)
IMM GRANULOCYTES NFR BLD AUTO: 0.2 % (ref 0–0.9)
KETONES UR STRIP.AUTO-MCNC: NEGATIVE MG/DL
LEUKOCYTE ESTERASE UR QL STRIP.AUTO: NEGATIVE
LYMPHOCYTES # BLD AUTO: 1.99 K/UL (ref 1–4.8)
LYMPHOCYTES NFR BLD: 38.6 % (ref 22–41)
MCH RBC QN AUTO: 29 PG (ref 27–33)
MCHC RBC AUTO-ENTMCNC: 33.5 G/DL (ref 33.6–35)
MCV RBC AUTO: 86.6 FL (ref 81.4–97.8)
MICRO URNS: NORMAL
MONOCYTES # BLD AUTO: 0.32 K/UL (ref 0–0.85)
MONOCYTES NFR BLD AUTO: 6.2 % (ref 0–13.4)
NEUTROPHILS # BLD AUTO: 2.71 K/UL (ref 2–7.15)
NEUTROPHILS NFR BLD: 52.5 % (ref 44–72)
NITRITE UR QL STRIP.AUTO: NEGATIVE
NRBC # BLD AUTO: 0 K/UL
NRBC BLD-RTO: 0 /100 WBC
PH UR STRIP.AUTO: 6 [PH]
PLATELET # BLD AUTO: 202 K/UL (ref 164–446)
PMV BLD AUTO: 8.6 FL (ref 9–12.9)
POTASSIUM SERPL-SCNC: 3.6 MMOL/L (ref 3.6–5.5)
PROT UR QL STRIP: NEGATIVE MG/DL
RBC # BLD AUTO: 4.55 M/UL (ref 4.2–5.4)
RBC UR QL AUTO: NEGATIVE
SODIUM SERPL-SCNC: 136 MMOL/L (ref 135–145)
SP GR UR STRIP.AUTO: <=1.005
WBC # BLD AUTO: 5.2 K/UL (ref 4.8–10.8)

## 2018-08-08 PROCEDURE — 87086 URINE CULTURE/COLONY COUNT: CPT

## 2018-08-08 PROCEDURE — 99284 EMERGENCY DEPT VISIT MOD MDM: CPT

## 2018-08-08 PROCEDURE — 700102 HCHG RX REV CODE 250 W/ 637 OVERRIDE(OP): Performed by: EMERGENCY MEDICINE

## 2018-08-08 PROCEDURE — 85025 COMPLETE CBC W/AUTO DIFF WBC: CPT

## 2018-08-08 PROCEDURE — 80048 BASIC METABOLIC PNL TOTAL CA: CPT

## 2018-08-08 PROCEDURE — 36415 COLL VENOUS BLD VENIPUNCTURE: CPT

## 2018-08-08 PROCEDURE — 93971 EXTREMITY STUDY: CPT

## 2018-08-08 PROCEDURE — 81003 URINALYSIS AUTO W/O SCOPE: CPT

## 2018-08-08 PROCEDURE — A9270 NON-COVERED ITEM OR SERVICE: HCPCS | Performed by: EMERGENCY MEDICINE

## 2018-08-08 RX ORDER — ACETAMINOPHEN 325 MG/1
650 TABLET ORAL ONCE
Status: COMPLETED | OUTPATIENT
Start: 2018-08-08 | End: 2018-08-08

## 2018-08-08 RX ORDER — IBUPROFEN 200 MG
600 TABLET ORAL EVERY 8 HOURS PRN
Status: SHIPPED | COMMUNITY
End: 2019-06-11

## 2018-08-08 RX ORDER — ACETAMINOPHEN 325 MG/1
650 TABLET ORAL ONCE
Status: DISCONTINUED | OUTPATIENT
Start: 2018-08-08 | End: 2018-08-08 | Stop reason: HOSPADM

## 2018-08-08 RX ORDER — THYROID 90 MG/1
90 TABLET ORAL DAILY
Status: SHIPPED | COMMUNITY
End: 2019-10-31 | Stop reason: SDUPTHER

## 2018-08-08 RX ORDER — MONTELUKAST SODIUM 4 MG/1
1 TABLET, CHEWABLE ORAL 4 TIMES DAILY
Status: SHIPPED | COMMUNITY
End: 2019-10-31 | Stop reason: SDUPTHER

## 2018-08-08 RX ORDER — OMEPRAZOLE 10 MG/1
10 CAPSULE, DELAYED RELEASE ORAL
Status: SHIPPED | COMMUNITY
End: 2019-10-31 | Stop reason: SDUPTHER

## 2018-08-08 RX ADMIN — ACETAMINOPHEN 650 MG: 325 TABLET, FILM COATED ORAL at 10:52

## 2018-08-08 ASSESSMENT — PAIN SCALES - GENERAL: PAINLEVEL_OUTOF10: 6

## 2018-08-08 NOTE — DISCHARGE INSTRUCTIONS
Deep Vein Thrombosis  A deep vein thrombosis (DVT) is a blood clot (thrombus) that usually occurs in a deep, larger vein of the lower leg or the pelvis, or in an upper extremity such as the arm. These are dangerous and can lead to serious and even life-threatening complications if the clot travels to the lungs.  A DVT can damage the valves in your leg veins so that instead of flowing upward, the blood pools in the lower leg. This is called post-thrombotic syndrome, and it can result in pain, swelling, discoloration, and sores on the leg.  What are the causes?  A DVT is caused by the formation of a blood clot in your leg, pelvis, or arm. Usually, several things contribute to the formation of blood clots. A clot may develop when:  · Your blood flow slows down.  · Your vein becomes damaged in some way.  · You have a condition that makes your blood clot more easily.  What increases the risk?  A DVT is more likely to develop in:  · People who are older, especially over 60 years of age.  · People who are overweight (obese).  · People who sit or lie still for a long time, such as during long-distance travel (over 4 hours), bed rest, hospitalization, or during recovery from certain medical conditions like a stroke.  · People who do not engage in much physical activity (sedentary lifestyle).  · People who have chronic breathing disorders.  · People who have a personal or family history of blood clots or blood clotting disease.  · People who have peripheral vascular disease (PVD), diabetes, or some types of cancer.  · People who have heart disease, especially if the person had a recent heart attack or has congestive heart failure.  · People who have neurological diseases that affect the legs (leg paresis).  · People who have had a traumatic injury, such as breaking a hip or leg.  · People who have recently had major or lengthy surgery, especially on the hip, knee, or abdomen.  · People who have had a central line placed  inside a large vein.  · People who take medicines that contain the hormone estrogen. These include birth control pills and hormone replacement therapy.  · Pregnancy or during childbirth or the postpartum period.  · Long plane flights (over 8 hours).  What are the signs or symptoms?     Symptoms of a DVT can include:  · Swelling of your leg or arm, especially if one side is much worse.  · Warmth and redness of your leg or arm, especially if one side is much worse.  · Pain in your arm or leg. If the clot is in your leg, symptoms may be more noticeable or worse when you stand or walk.  · A feeling of pins and needles, if the clot is in the arm.  The symptoms of a DVT that has traveled to the lungs (pulmonary embolism, PE) usually start suddenly and include:  · Shortness of breath while active or at rest.  · Coughing or coughing up blood or blood-tinged mucus.  · Chest pain that is often worse with deep breaths.  · Rapid or irregular heartbeat.  · Feeling light-headed or dizzy.  · Fainting.  · Feeling anxious.  · Sweating.  There may also be pain and swelling in a leg if that is where the blood clot started.  These symptoms may represent a serious problem that is an emergency. Do not wait to see if the symptoms will go away. Get medical help right away. Call your local emergency services (911 in the [place] Do not drive yourself to the hospital.   How is this diagnosed?  Your health care provider will take a medical history and perform a physical exam. You may also have other tests, including:  · Blood tests to assess the clotting properties of your blood.  · Imaging tests, such as CT, ultrasound, MRI, X-ray, and other tests to see if you have clots anywhere in your body.  How is this treated?  After a DVT is identified, it can be treated. The type of treatment that you receive depends on many factors, such as the cause of your DVT, your risk for bleeding or developing more clots, and other medical conditions that you  have. Sometimes, a combination of treatments is necessary. Treatment options may be combined and include:  · Monitoring the blood clot with ultrasound.  · Taking medicines by mouth, such as newer blood thinners (anticoagulants), thrombolytics, or warfarin.  · Taking anticoagulant medicine by injection or through an IV tube.  · Wearing compression stockings or using different types of devices.  · Surgery (rare) to remove the blood clot or to place a filter in your abdomen to stop the blood clot from traveling to your lungs.  Treatments for a DVT are often divided into immediate treatment and long-term treatment (up to 3 months after DVT). You can work with your health care provider to choose the treatment program that is best for you.  Follow these instructions at home:  If you are taking a newer oral anticoagulant:  · Take the medicine every single day at the same time each day.  · Understand what foods and drugs interact with this medicine.  · Understand that there are no regular blood tests required when using this medicine.  · Understand the side effects of this medicine, including excessive bruising or bleeding. Ask your health care provider or pharmacist about other possible side effects.  If you are taking warfarin:  · Understand how to take warfarin and know which foods can affect how warfarin works in your body.  · Understand that it is dangerous to take too much or too little warfarin. Too much warfarin increases the risk of bleeding. Too little warfarin continues to allow the risk for blood clots.  · Follow your PT and INR blood testing schedule. The PT and INR results allow your health care provider to adjust your dose of warfarin. It is very important that you have your PT and INR tested as often as told by your health care provider.  · Avoid major changes in your diet, or tell your health care provider before you change your diet. Arrange a visit with a registered dietitian to answer your questions.  Many foods, especially foods that are high in vitamin K, can interfere with warfarin and affect the PT and INR results. Eat a consistent amount of foods that are high in vitamin K, such as:  ¨ Spinach, kale, broccoli, cabbage, baljit greens, turnip greens, [place] sprouts, peas, cauliflower, seaweed, and parsley.  ¨ Beef liver and pork liver.  ¨ Green tea.  ¨ Soybean oil.  · Tell your health care provider about any and all medicines, vitamins, and supplements that you take, including aspirin and other over-the-counter anti-inflammatory medicines. Be especially cautious with aspirin and anti-inflammatory medicines. Do not take those before you ask your health care provider if it is safe to do so. This is important because many medicines can interfere with warfarin and affect the PT and INR results.  · Do not start or stop taking any over-the-counter or prescription medicine unless your health care provider or pharmacist tells you to do so.  If you take warfarin, you will also need to do these things:  · Hold pressure over cuts for longer than usual.  · Tell your dentist and other health care providers that you are taking warfarin before you have any procedures in which bleeding may occur.  · Avoid alcohol or drink very small amounts. Tell your health care provider if you change your alcohol intake.  · Do not use tobacco products, including cigarettes, chewing tobacco, and e-cigarettes. If you need help quitting, ask your health care provider.  · Avoid contact sports.  General instructions  · Take over-the-counter and prescription medicines only as told by your health care provider. Anticoagulant medicines can have side effects, including easy bruising and difficulty stopping bleeding. If you are prescribed an anticoagulant, you will also need to do these things:  ¨ Hold pressure over cuts for longer than usual.  ¨ Tell your dentist and other health care providers that you are taking anticoagulants before you have  any procedures in which bleeding may occur.  ¨ Avoid contact sports.  · Wear a medical alert bracelet or carry a medical alert card that says you have had a PE.  · Ask your health care provider how soon you can go back to your normal activities. Stay active to prevent new blood clots from forming.  · Make sure to exercise while traveling or when you have been sitting or standing for a long period of time. It is very important to exercise. Exercise your legs by walking or by tightening and relaxing your leg muscles often. Take frequent walks.  · Wear compression stockings as told by your health care provider to help prevent more blood clots from forming.  · Do not use tobacco products, including cigarettes, chewing tobacco, and e-cigarettes. If you need help quitting, ask your health care provider.  · Keep all follow-up appointments with your health care provider. This is important.  How is this prevented?  Take these actions to decrease your risk of developing another DVT:  · Exercise regularly. For at least 30 minutes every day, engage in:  ¨ Activity that involves moving your arms and legs.  ¨ Activity that encourages good blood flow through your body by increasing your heart rate.  · Exercise your arms and legs every hour during long-distance travel (over 4 hours). Drink plenty of water and avoid drinking alcohol while traveling.  · Avoid sitting or lying in bed for long periods of time without moving your legs.  · Maintain a weight that is appropriate for your height. Ask your health care provider what weight is healthy for you.  · If you are a woman who is over 35 years of age, avoid unnecessary use of medicines that contain estrogen. These include birth control pills.  · Do not smoke, especially if you take estrogen medicines. If you need help quitting, ask your health care provider.  If you are hospitalized, prevention measures may include:  · Early walking after surgery, as soon as your health care provider  says that it is safe.  · Receiving anticoagulants to prevent blood clots. If you cannot take anticoagulants, other options may be available, such as wearing compression stockings or using different types of devices.  Get help right away if:  · You have new or increased pain, swelling, or redness in an arm or leg.  · You have numbness or tingling in an arm or leg.  · You have shortness of breath while active or at rest.  · You have chest pain.  · You have a rapid or irregular heartbeat.  · You feel light-headed or dizzy.  · You cough up blood.  · You notice blood in your vomit, bowel movement, or urine.  These symptoms may represent a serious problem that is an emergency. Do not wait to see if the symptoms will go away. Get medical help right away. Call your local emergency services (911 in the [place] Do not drive yourself to the hospital.   This information is not intended to replace advice given to you by your health care provider. Make sure you discuss any questions you have with your health care provider.  Document Released: 12/18/2006 Document Revised: 05/25/2017 Document Reviewed: 04/13/2016  [employer] Patient Education © 2017 [employer]

## 2018-08-08 NOTE — ED PROVIDER NOTES
"ED Provider Note    CHIEF COMPLAINT  Chief Complaint   Patient presents with   • Other     R arm pic line pain and swelling       HPI  Farideh Cunha is a 28 y.o. female who presents to the Emergency Department   With UTI symptoms  Since  May, continued  UTI with approximately 6-7 urinary tract infections and decision was made [facility] to [facility], the patient has received 7 days of IV antibiotics, ertapenem and is due to continue them until Friday.  She is scheduled to see infectious disease tomorrow. She states her right arm started becoming painful and swollen and so she came to the emergency department for evaluation.  She does state she is still having the same urinary discomfort and symptoms--burning and dysuria.  She denies any back pain fever or vomiting                                                                                                                           REVIEW OF SYSTEMS  Positive for right arm swelling and pain dysuria, Negative for fever chills.  As above all other systems are negative.    PAST MEDICAL HISTORY  Recurrent urinary tract infections    FAMILY HISTORY  History reviewed. No pertinent family history.     SOCIAL HISTORY  Social History     Social History Main Topics   • Smoking status: Never Smoker   • Smokeless tobacco: Never Used   • Alcohol use No   • Drug use: No      Comment: cbd   • Sexual activity: Not on file       SURGICAL HISTORY  patient denies any surgical history    CURRENT MEDICATIONS  Reviewed.  See Encounter Summary.  Include Ertepenem  ALLERGIES  Allergies   Allergen Reactions   • Bactrim [Sulfamethoxazole W-Trimethoprim]    • Ceftin [Cefuroxime]      Throat swelling   • Ciprofloxacin        PHYSICAL EXAM  VITAL SIGNS: /71   Pulse 87   Temp 36.7 °C (98.1 °F)   Resp 16   Ht 1.651 m (5' 5\")   Wt 101.1 kg (222 lb 14.2 oz)   LMP 07/17/2018 (Approximate)   BMI 37.09 kg/m²   Constitutional:  Afebrile, able to " answer questions  HENT: Nose is normal in appearance, external ears are normal,  moist mucous membranes  Eyes: Anicteric,  pupils are equal round and reactive, there is no conjunctival drainage or pallor   Neck: The trachea is midline, there is no obvious mass or meningeal signs  Cardiovascular: Good perfusion,  regular rate and rhythm without murmurs gallops or rubs  Thorax & Lungs: Respiratory rate and effort are normal. There is normal chest excursion with respiration.  No wheezes rhonchi or rales noted.  Abdomen: Abdomen is normal in appearance, no gross peritoneal signs  normal bowel sounds, no pain with cough  :   No CVA tenderness to palpation  Musculoskeletal: PICC line is inserted in right upper extremity with noted swelling to that extremity as well.  Skin: Visualized skin is warm without rash.  Neurologic:  Cranial nerves II through XII are intact there is no focal abnormality noted.  Psychiatric: Normal mood and mentation    RADIOLOGY/PROCEDURES  Imaging Studies:    UE VENOUS DUPLEX              Right upper extremity.    Echogenic material visualized around the PICC line in the proximal basilic    vein. Vessel is incompressible and no color flow is demonstrated,    suggesting superficial venous thrombosis of the proximal basilic vein.    Pertinent Labs   Results for orders placed or performed during the hospital encounter of 08/08/18   CBC WITH DIFFERENTIAL   Result Value Ref Range    WBC 5.2 4.8 - 10.8 K/uL    RBC 4.55 4.20 - 5.40 M/uL    Hemoglobin 13.2 12.0 - 16.0 g/dL    Hematocrit 39.4 37.0 - 47.0 %    MCV 86.6 81.4 - 97.8 fL    MCH 29.0 27.0 - 33.0 pg    MCHC 33.5 (L) 33.6 - 35.0 g/dL    RDW 41.1 35.9 - 50.0 fL    Platelet Count 202 164 - 446 K/uL    MPV 8.6 (L) 9.0 - 12.9 fL    Neutrophils-Polys 52.50 44.00 - 72.00 %    Lymphocytes 38.60 22.00 - 41.00 %    Monocytes 6.20 0.00 - 13.40 %    Eosinophils 1.90 0.00 - 6.90 %    Basophils 0.60 0.00 - 1.80 %    Immature Granulocytes 0.20 0.00 - 0.90 %     Nucleated RBC 0.00 /100 WBC    Neutrophils (Absolute) 2.71 2.00 - 7.15 K/uL    Lymphs (Absolute) 1.99 1.00 - 4.80 K/uL    Monos (Absolute) 0.32 0.00 - 0.85 K/uL    Eos (Absolute) 0.10 0.00 - 0.51 K/uL    Baso (Absolute) 0.03 0.00 - 0.12 K/uL    Immature Granulocytes (abs) 0.01 0.00 - 0.11 K/uL    NRBC (Absolute) 0.00 K/uL   BMP   Result Value Ref Range    Sodium 136 135 - 145 mmol/L    Potassium 3.6 3.6 - 5.5 mmol/L    Chloride 106 96 - 112 mmol/L    Co2 22 20 - 33 mmol/L    Glucose 89 65 - 99 mg/dL    Bun 8 8 - 22 mg/dL    Creatinine 0.57 0.50 - 1.40 mg/dL    Calcium 9.2 8.4 - 10.2 mg/dL    Anion Gap 8.0 0.0 - 11.9   URINALYSIS,CULTURE IF INDICATED   Result Value Ref Range    Color Yellow     Character Clear     Specific Gravity <=1.005 <1.035    Ph 6.0 5.0 - 8.0    Glucose Negative Negative mg/dL    Ketones Negative Negative mg/dL    Protein Negative Negative mg/dL    Bilirubin Negative Negative    Nitrite Negative Negative    Leukocyte Esterase Negative Negative    Occult Blood Negative Negative    Micro Urine Req see below    ESTIMATED GFR   Result Value Ref Range    GFR If African American >60 >60 mL/min/1.73 m 2    GFR If Non African American >60 >60 mL/min/1.73 m 2           COURSE & MEDICAL DECISION MAKING  Nursing notes and vital signs were reviewed. (See chart for details)  The patients  records were reviewed, history was obtained from the patient and mother;     The patient presents with right arm pain and swelling at PICC insertion, and the differential diagnosis includes but is not limited to DVT, arm irritation secondary to PICC placement, less likely would be arm infection or cellulitis as it does not clinically appear reddened and warm.       Initial orders in the Emergency Department included ultrasound of right upper extremity     ED testing reveals that the patient does have a basilic vein thrombosis, I discussed the patient's care with her primary care physician physician Dr. Vargas, and as  she has received 7 days of antibiotics, her white count is normal and urine is normal I think it is more important to pull the PICC line then keep the PICC line in place.  The mother is very concerned that the 2 additional days of antibiotics will make it so that the infection does not resolve.  I have discussed with them as they have an infectious disease consult tomorrow if she needs additional antibiotics that can be arranged at the infusion center    Additionally, the patient after forming a deep venous thrombosis will need anticoagulation and will be started on a Xarelto starter pack of 15 mg twice a day for 21 days  which was discussed with Dr. Vargas as well      FINAL IMPRESSION  1.  Deep venous thrombosis  2.        DISPOSITION  Home in stable condition         FOLLOW UP:  Johan Vargas M.D.  [id number] [facility] [id number] [phone]          Flores Knapp M.D.  [address] [facility] [address]  [facility] [id number] [phone]      appointment tomorrow      OUTPATIENT MEDICATIONS:  New Prescriptions    RIVAROXABAN (XARELTO) 15 MG TAB TABLET    Take 1 Tab by mouth 2 Times a Day for 21 days.         The patient was discharged home with an information sheet on deep venous thrombosis and told to return immediately for any signs or symptoms listed, but specifically if chest pain shortness of breath, or any worsening at all.  The patient verbally agreed to the discharge precautions and follow-up plan which is documented in EPIC.    Electronically signed by: Cindy Nino, 8/8/2018 8:55 AM

## 2018-08-08 NOTE — ED NOTES
Med rec complete per patient  Allergies reviewed    Patient is on an infusion once daily and last dose 8-10-18

## 2018-08-08 NOTE — ED NOTES
Medicated per request, released with all follow-up and RX. Pt appears to understand instructions though very anxious. Released with mom to POV. No bleeding to site.

## 2018-08-09 ENCOUNTER — OFFICE VISIT (OUTPATIENT)
Dept: INFECTIOUS DISEASES | Facility: MEDICAL CENTER | Age: 28
End: 2018-08-09
Payer: COMMERCIAL

## 2018-08-09 VITALS
BODY MASS INDEX: 36.99 KG/M2 | TEMPERATURE: 98.7 F | HEART RATE: 104 BPM | WEIGHT: 222 LBS | DIASTOLIC BLOOD PRESSURE: 68 MMHG | OXYGEN SATURATION: 98 % | SYSTOLIC BLOOD PRESSURE: 120 MMHG | HEIGHT: 65 IN

## 2018-08-09 DIAGNOSIS — N39.0 URINARY TRACT INFECTION WITHOUT HEMATURIA, SITE UNSPECIFIED: ICD-10-CM

## 2018-08-09 PROCEDURE — 99214 OFFICE O/P EST MOD 30 MIN: CPT | Performed by: INTERNAL MEDICINE

## 2018-08-09 RX ORDER — PHENAZOPYRIDINE HYDROCHLORIDE 200 MG/1
200 TABLET, FILM COATED ORAL 3 TIMES DAILY PRN
Qty: 6 TAB | Refills: 0 | Status: SHIPPED | OUTPATIENT
Start: 2018-08-09 | End: 2019-10-31

## 2018-08-09 RX ORDER — GRANULES FOR ORAL 3 G/1
3 POWDER ORAL ONCE
Qty: 1 EACH | Refills: 0 | Status: SHIPPED | OUTPATIENT
Start: 2018-08-09 | End: 2018-08-09

## 2018-08-09 NOTE — ED PROVIDER NOTES
Patient's ultrasound shows  Echogenic material visualized around the PICC line in the proximal basilic    vein. Echogenic material visualized around the PICC line in the proximal basilic    vein.  This was read as no DVT and anticoagulation will be stopped.  Patient was made aware and will repeat US in one week if any swelling or pain.

## 2018-08-09 NOTE — PROGRESS NOTES
DATE OF SERVICE:  8/9/2018     REFERRING PHYSICIAN:  Johan Vargas M.D.     REASON FOR CONSULTATION: Recurrent UTI     HISTORY OF PRESENT ILLNESS:  Patient is a 28 y.o. female who has a past medical history of repeated utis .  Patient says her UTIs have been associated with her sexual intercourse and she takes Macrobid after sex.  She forgot in May and ended up with significant UTI symptoms.   She was also seen in the ER on 5/20/2018 with worsening bilateral flank pain.  She apparently had UTI symptoms since May. she completed a course of Macrobid with no improvement and followed by Bactrim which caused allergic swelling of the throat and followed by full course of Keflex.  She was switched to Cipro but had an allergic to Cipro and was discontinued.  Then decision was made by urology to give her IV antibiotics.  She was given ertapenem.  Her urine culture grew E. coli on 7/19/2018 which was fairly resistant.  Her became swollen and painful and hence she came to the ER on 8/8/2018.  She was diagnosed with SVT around the PICC.  The PICC was pulled.  She has been referred to infectious diseases  REVIEW OF SYSTEMS:    Constitutional: Had some fevers off and on  HENT: Negative for hearing loss and visual changes   Eyes: Negative for blurred vision, double vision and photophobia.   Respiratory: Negative for cough.   Cardiovascular: Negative for chest pain and leg swelling.   Gastrointestinal: Negative for nausea, vomiting and diarrhea.   Musculoskeletal: Negative for myalgias and back pain.   Skin: Negative for rash.   Neurological: Negative for sensory change, focal weakness and headaches.   Genitourinary-the flank pain has resolved with IV ertapenem.  Still has some dysuria.  Denies any vaginal discharge  ALLERGIES:    Allergies   Allergen Reactions   • Bactrim [Sulfamethoxazole W-Trimethoprim]    • Ceftin [Cefuroxime]      Throat swelling   • Ciprofloxacin         PAST MEDICAL HISTORY: No past medical history on  "file.    PAST SURGICAL HISTORY:  No past surgical history on file.    FAMILY HISTORY:  No family history on file.     SOCIAL HISTORY:    Social History     Social History   • Marital status:      Spouse name: N/A   • Number of children: N/A   • Years of education: N/A     Occupational History   • Not on file.     Social History Main Topics   • Smoking status: Never Smoker   • Smokeless tobacco: Never Used   • Alcohol use No   • Drug use: No      Comment: cbd   • Sexual activity: Not on file     Other Topics Concern   • Not on file     Social History Narrative   • No narrative on file        MEDICATIONS:   Current Outpatient Prescriptions   Medication Sig Dispense Refill   • fosfomycin (MONUROL) 3 GM Pack Take 3 g by mouth Once for 1 dose. 1 Each 0   • phenazopyridine (PYRIDIUM) 200 MG Tab Take 1 Tab by mouth 3 times a day as needed. 6 Tab 0   • thyroid (NP THYROID) 90 MG Tab Take 90 mg by mouth every day.     • omeprazole (PRILOSEC) 10 MG CAPSULE DELAYED RELEASE Take 10 mg by mouth every day.     • colestipol (COLESTID) 1 GM Tab Take 1 g by mouth 4 times a day.     • ERTAPENEM SODIUM INJ 1 Dose by Injection route every day. Last dose 8-10-18     • ibuprofen (MOTRIN) 200 MG Tab Take 600 mg by mouth every 8 hours as needed for Mild Pain.     • rivaroxaban (XARELTO) 15 MG Tab tablet Take 1 Tab by mouth 2 Times a Day for 21 days. 42 Tab 0     No current facility-administered medications for this visit.         LABORATORY DATA: Urine culture is E. Coli  UA was negative on 8/8/2018   WBC was 5.2 on 8/8/2018  Vascular ultrasound showed a superficial vein thrombosis with no evidence of DVT  PHYSICAL EXAMINATION:PE:      /68   Pulse (!) 104   Temp 37.1 °C (98.7 °F)   Ht 1.651 m (5' 5\")   Wt 100.7 kg (222 lb)   LMP 07/17/2018 (Approximate)   SpO2 98%   BMI 36.94 kg/m²        Constitutional: Patient is oriented to person, place, and time.  she appears well-developed and well-nourished. No distress  Eyes: " Conjunctivae normal and EOM are normal. Pupils are equal, round, and reactive to light.   Mouth/Throat: Lips without lesions, good dentition, oropharynx is clear and moist.  Neck: Trachea midline. Normal range of motion. Neck supple. No masses  Cardiovascular: Normal rate, regular rhythm, normal heart sounds and intact distal pulses. No murmur, gallop, or friction rub. No edema.  Pulmonary/Chest: No respiratory distress. Unlabored respiratory effort, lungs clear to auscultation. No wheezes or rales.   Abdominal: Soft, non tender. BS + x 4. No masses or hepatosplenomegaly.   Musculoskeletal: Normal range of motion. No tenderness, swelling, erythema, deformity noted.  Neurological:  she is alert and oriented to person, place, and time. No cranial nerve deficit. Coordination normal.   Skin: Skin is warm and dry. Good turgor. No rashes visable.  Psychiatric:  she has a normal mood and affect.  her behavior is normal.        ASSESSMENT:  1. Urinary tract infection without hematuria, site unspecified     2.  Superficial vein thrombosis     RECOMMENDATIONS:      At this time patient has finished her treatment for pyelonephritis.  She got 8 days of ertapenem.  The UA was negative and patient had no leukocytosis.  Since the patient is still symptomatic I am going to give her Pyridium and 1 dose of fosfomycin.  She was advised to give us a call next week.  As far as her clot is concerned-she had superficial clot associated with the PICC.  The PICC was removed.  I spoke to Dr. Nino in the ER.  She is going to rediscuss the ultrasound with the radiologist and give patient a call because there is some confusion about whether there is DVT or not.  Patient will follow up with primary care physician.  She will call us next week with her symptoms.  She was encouraged to continue with the Macrobid after sex.  I have reviewed all the records

## 2018-08-10 LAB
BACTERIA UR CULT: NORMAL
SIGNIFICANT IND 70042: NORMAL
SITE SITE: NORMAL
SOURCE SOURCE: NORMAL

## 2018-08-21 ENCOUNTER — OFFICE VISIT (OUTPATIENT)
Dept: INFECTIOUS DISEASES | Facility: MEDICAL CENTER | Age: 28
End: 2018-08-21
Payer: COMMERCIAL

## 2018-08-21 VITALS
BODY MASS INDEX: 35.99 KG/M2 | HEART RATE: 107 BPM | WEIGHT: 216 LBS | TEMPERATURE: 98.2 F | DIASTOLIC BLOOD PRESSURE: 70 MMHG | HEIGHT: 65 IN | OXYGEN SATURATION: 99 % | SYSTOLIC BLOOD PRESSURE: 116 MMHG

## 2018-08-21 DIAGNOSIS — N30.00 ACUTE CYSTITIS WITHOUT HEMATURIA: ICD-10-CM

## 2018-08-21 DIAGNOSIS — Z88.1 ALLERGY TO MULTIPLE ANTIBIOTICS: ICD-10-CM

## 2018-08-21 PROCEDURE — 99215 OFFICE O/P EST HI 40 MIN: CPT | Performed by: NURSE PRACTITIONER

## 2018-08-21 RX ORDER — NITROFURANTOIN 25; 75 MG/1; MG/1
100 CAPSULE ORAL PRN
Refills: 0 | COMMUNITY
Start: 2018-07-17 | End: 2020-08-11

## 2018-08-21 RX ORDER — METHENAMINE, SODIUM PHOSPHATE, MONOBASIC, MONOHYDRATE, PHENYL SALICYLATE, METHYLENE BLUE, AND HYOSCYAMINE SULFATE 118; 40.8; 36; 10; .12 MG/1; MG/1; MG/1; MG/1; MG/1
CAPSULE ORAL
COMMUNITY
End: 2020-04-22

## 2018-08-21 NOTE — PROGRESS NOTES
Subjective:     Chief Complaint   Patient presents with   • Follow-Up     Urinary tract infection without hematuria, site unspecified     Infectious Disease clinic follow up  Farideh Cunha 28 y.o.female in clinic today for evaluation and management of UTI. Primary care provider: Johan Vargas M.D. This is my first time meeting Ms. Cunha. She is accompanied today by her . Hx of recurrent UTIs. Denies any other medical history.     5/3/18   Related to sexual intercourse.  Macrobid after sex.     Interval History: She established care with Dr. Knapp on 8/9/18 due to UTI. She reports hx of recurrent UTIs related to sexual intercourse. She had been taking Macrobid after sex for 2 years without recurrent infection. She states that in May she forgot to take the Macrobid and woke up in the morning with significant UTI symptoms. She was seen in the emergency room on 5/20/18 with bilateral flank pain. She reports that she has had significant symptoms since May despite multiple courses of antibiotics. She reports that at different time she was prescribed Bactrim, Cipro, Cefdinir.  She states that with each of these antibiotics she developed throat swelling after the 1st dose and subsequently stopped the antibiotic. Urine culture 7/19/18 +E coli that was fairly resistant (see scanned media). Decision was made by [facility] [place] to start her on IV ertapenem. On 8/8/18 she presented to the emergency room because her arm became swollen and painful. PICC line was discontinued. Patient had completed 8 days of IV ertapenem and had 2 days remaining of original ertapenem prescription. She was very concerned about stopping the antibiotics 2 days early and was seen by Dr. Knapp the following day on 8/9/18. At that time she was reassured that she completed treatment for pyelonephritis. UA was negative and patient had no leukocytosis. However the patient still reported urinary symptoms and was given 1 dose of fosfomycin as  "well as Pyridium.    Records reviewed    Today 8/21/18: Patient reports continued dysuria describing it as a pinching-type discomfort. No urgency or frequency. No flank pain or hematuria. Denies any vaginal itching or vaginal discharge. She does mention that the dysuria resolved around day 6-7 of the IV ertapenem but that it re-occurred immediately after stopping the ertapenem and that she had no relief with the fosfomycin. She states the symptoms are constant and are \"ruining my life.\" She states that she has been taking over-the-counter urinary analgesics just to get through the day. She states that she has been followed by [facility] [place] but have told her there is nothing else they can do for her and that they need to follow up with us regarding the recurrent infections as she may be \"seeded\" with bacteria.  Patient stating that [facility] recommended specialized Micro DNA testing, but informed her that it would need to be ordered by infectious disease. At some point she also tried to be seen by a specialist JONNY Daniel per the recommendation of [facility], however was unable to due to insurance reasons.  Denies feeling generally ill, fevers/chills, general malaise, headache, n/v/d. She denies any caffeine intake stating she only drinks water throughout the day.     Allergies: Bactrim [sulfamethoxazole w-trimethoprim]; Ceftin [cefuroxime]; and Ciprofloxacin    Current medications and problem list reviewed with patient and updated in EPIC.    ROS  As documented above in my HPI       Objective:     PE:  /70   Pulse (!) 107   Temp 36.8 °C (98.2 °F)   Ht 1.651 m (5' 5\")   Wt 98 kg (216 lb)   SpO2 99%   BMI 35.94 kg/m²      Vital signs reviewed  Constitutional: patient is oriented to person, place, and time. Appears well-developed and well-nourished. No distress  Eyes: Conjunctivae normal and EOM are normal. Pupils are equal, round, and reactive to light.   Mouth/Throat: Lips without lesions, good " "dentition, oropharynx is clear and moist.  Neck: Trachea midline. Normal range of motion. Neck supple. No masses  Cardiovascular: Normal rate, regular rhythm, normal heart sounds and intact distal pulses. No murmur, gallop, or friction rub. No edema.  Pulmonary/Chest: No respiratory distress. Unlabored respiratory effort, lungs clear to auscultation. No wheezes or rales.   Abdominal: Soft, non tender. BS + x 4. No masses or hepatosplenomegaly. No CVA tenderness  Musculoskeletal: Normal range of motion. No tenderness, swelling, erythema, deformity noted.  Neurological: He is alert and oriented to person, place, and time. No cranial nerve deficit. Coordination normal.   Skin: Skin is warm and dry. Good turgor. No rashes visable.  Psychiatric: Normal mood and affect. Patient appears anxious and becomes tearful stating that her symptoms are \"ruining her life\"      Assessment and Plan:   The following treatment plan was discussed with patient at length  1. Acute cystitis without hematuria  URINE CULTURE(NEW)    CHLAMYDIA/GC PCR URINE OR SWAB    MYCOPLASMA CULTURE   2. Allergy to multiple antibiotics  REFERRAL TO ALLERGY     Patient took her last dose of antibiotics on 8/8/18  I discussed patient's request for DNA testing with Dr. Troncoso. Both him and I agree that it is not appropriate at this time. At this point we will get a UA with culture if indicated   Will also check a urine GC and urine Mycoplasma genitalium PCR  In light of patient having recurrent urinary tract infections and reporting anaphylactic reaction to multiple antibiotics I have referred her to an allergist for further testing.  Follow up: 1 week to review results, RTC sooner if needed. FU with PCP for ongoing chronic medical conditions.          45 min spent face to face with patient, >50% of time spent counseling, coordinating care, reviewing records, discussing POC, educating patient regarding regarding recurrent infections, colonization, her " request for DNA testing.    Please note that this dictation was created using voice recognition software. I have  worked with technical experts from [facility] to optimize the interface.  I have made every reasonable attempt to correct obvious errors, but there may be errors of grammar and possibly content that I did not discover before finalizing the note.

## 2018-08-22 ENCOUNTER — HOSPITAL ENCOUNTER (OUTPATIENT)
Dept: LAB | Facility: MEDICAL CENTER | Age: 28
End: 2018-08-22
Attending: NURSE PRACTITIONER
Payer: COMMERCIAL

## 2018-08-22 DIAGNOSIS — N30.00 ACUTE CYSTITIS WITHOUT HEMATURIA: ICD-10-CM

## 2018-08-22 PROCEDURE — 87109 MYCOPLASMA: CPT

## 2018-08-22 PROCEDURE — 87491 CHLMYD TRACH DNA AMP PROBE: CPT

## 2018-08-22 PROCEDURE — 36415 COLL VENOUS BLD VENIPUNCTURE: CPT

## 2018-08-22 PROCEDURE — 87086 URINE CULTURE/COLONY COUNT: CPT

## 2018-08-22 PROCEDURE — 87591 N.GONORRHOEAE DNA AMP PROB: CPT

## 2018-08-24 LAB
BACTERIA UR CULT: NORMAL
SIGNIFICANT IND 70042: NORMAL
SITE SITE: NORMAL
SOURCE SOURCE: NORMAL

## 2018-08-26 LAB — MISCELLANEOUS LAB RESULT MISCLAB: NORMAL

## 2018-08-28 ENCOUNTER — TELEPHONE (OUTPATIENT)
Dept: INFECTIOUS DISEASES | Facility: MEDICAL CENTER | Age: 28
End: 2018-08-28

## 2018-08-28 NOTE — TELEPHONE ENCOUNTER
Pt called asking if her cultures came back and what they said. I informed her that the cultures showed a negative result but that PARMJIT Akers would be able to interpret the finding more fully.   Pt requested PARMJIT Akers to call and discuss further.  AMP

## 2018-09-13 ENCOUNTER — OFFICE VISIT (OUTPATIENT)
Dept: MEDICAL GROUP | Facility: PHYSICIAN GROUP | Age: 28
End: 2018-09-13
Payer: COMMERCIAL

## 2018-09-13 VITALS
OXYGEN SATURATION: 98 % | RESPIRATION RATE: 16 BRPM | TEMPERATURE: 97.5 F | BODY MASS INDEX: 36.99 KG/M2 | HEIGHT: 65 IN | HEART RATE: 82 BPM | DIASTOLIC BLOOD PRESSURE: 76 MMHG | SYSTOLIC BLOOD PRESSURE: 116 MMHG | WEIGHT: 222 LBS

## 2018-09-13 DIAGNOSIS — Z00.00 ROUTINE GENERAL MEDICAL EXAMINATION AT A HEALTH CARE FACILITY: ICD-10-CM

## 2018-09-13 DIAGNOSIS — N39.0 RECURRENT UTI: ICD-10-CM

## 2018-09-13 DIAGNOSIS — E66.9 OBESITY (BMI 30-39.9): ICD-10-CM

## 2018-09-13 DIAGNOSIS — N92.1 MENORRHAGIA WITH IRREGULAR CYCLE: ICD-10-CM

## 2018-09-13 DIAGNOSIS — R39.9 URINARY SYMPTOM OR SIGN: ICD-10-CM

## 2018-09-13 DIAGNOSIS — E06.3 HASHIMOTO'S DISEASE: ICD-10-CM

## 2018-09-13 DIAGNOSIS — K21.9 GASTROESOPHAGEAL REFLUX DISEASE, ESOPHAGITIS PRESENCE NOT SPECIFIED: ICD-10-CM

## 2018-09-13 PROCEDURE — 99204 OFFICE O/P NEW MOD 45 MIN: CPT | Performed by: INTERNAL MEDICINE

## 2018-09-13 ASSESSMENT — PATIENT HEALTH QUESTIONNAIRE - PHQ9: CLINICAL INTERPRETATION OF PHQ2 SCORE: 0

## 2018-09-13 NOTE — ASSESSMENT & PLAN NOTE
Counseling for a small portion, balanced diet, exercising for3-5 times per week.  She had lost 40 lbs, now harder with UTI recurrence

## 2018-09-13 NOTE — ASSESSMENT & PLAN NOTE
She is on omeprazole. Helps with her symptoms. Never had EGD. She denies dysphagia, weight loss, hematochezia, melanotic stool, abdominal pain, epigastric pain

## 2018-09-13 NOTE — ASSESSMENT & PLAN NOTE
She tells me that she has been diagnose in [place] couple of years ago. She was on synthroid, and she did not feel well. She was still tired, depressed, weight gain. She was on armour and she felt better. She is currently on NP thyroid, and she feels stable. No recent lab work. I can feel thyromegaly. Sometimes she feels sensation of burning, and pain tender for getting large thyroid

## 2018-09-13 NOTE — ASSESSMENT & PLAN NOTE
She has urinary symptoms, more lower pelvic pain at urethra. She does not have lower pelvic pain with eating. No burning sensation. She is following with Dr. Shepard urologist in [place]. She has been on multiple antibiotic as Macrobid, bactrim, even ertapenem and she still has symptoms. She uses Macrobid after intercourse to prevent recurrence. She denies flank pain, low pelvic pain, hematuria, fever, unintentional weight loss

## 2018-09-13 NOTE — ASSESSMENT & PLAN NOTE
She has veyr heavy menstrual cycle. Sometimes irregular. She is on OCP and helps for her to feel better

## 2018-09-13 NOTE — PROGRESS NOTES
New Patient to Establish    Reason to establish: thyroid condition, lab work , UTI symptoms, chronic    Farideh Cunha is a 28 y.o. female here today for evaluation and management of:    Hashimoto's disease  She tells me that she has been diagnose in [place] couple of years ago. She was on synthroid, and she did not feel well. She was still tired, depressed, weight gain. She was on armour and she felt better. She is currently on NP thyroid, and she feels stable. No recent lab work. I can feel thyromegaly. Sometimes she feels sensation of burning, and pain tender for getting large thyroid     Menorrhagia with irregular cycle  She has veyr heavy menstrual cycle. Sometimes irregular. She is on OCP and helps for her to feel better     GERD (gastroesophageal reflux disease)  She is on omeprazole. Helps with her symptoms. Never had EGD. She denies dysphagia, weight loss, hematochezia, melanotic stool, abdominal pain, epigastric pain    Urinary symptom or sign  She has urinary symptoms, more lower pelvic pain at urethra. She does not have lower pelvic pain with eating. No burning sensation. She is following with Dr. Shepard urologist in [place]. She has been on multiple antibiotic as Macrobid, bactrim, even ertapenem and she still has symptoms. She uses Macrobid after intercourse to prevent recurrence. She denies flank pain, low pelvic pain, hematuria, fever, unintentional weight loss    Recurrent UTI  As per above     Obesity (BMI 30-39.9)  Counseling for a small portion, balanced diet, exercising for3-5 times per week.  She had lost 40 lbs, now harder with UTI recurrence       Past Medical History:   Diagnosis Date   • GERD (gastroesophageal reflux disease)    • Thyroid disease    • Urinary tract infection        Current Outpatient Prescriptions   Medication Sig Dispense Refill   • QUASENSE 0.15-0.03 MG per tablet Take 1 Tab by mouth every day. 84 Tab 3   • nitrofurantoin monohydr macro (MACROBID) 100 MG Cap Take 100 mg  "by mouth as needed.  0   • Meth-Hyo-M Bl-Na Phos-Ph Sal (URIBEL) 118 MG Cap Take  by mouth.     • phenazopyridine (PYRIDIUM) 200 MG Tab Take 1 Tab by mouth 3 times a day as needed. 6 Tab 0   • thyroid (NP THYROID) 90 MG Tab Take 90 mg by mouth every day.     • omeprazole (PRILOSEC) 10 MG CAPSULE DELAYED RELEASE Take 10 mg by mouth every day.     • colestipol (COLESTID) 1 GM Tab Take 1 g by mouth 4 times a day.     • ibuprofen (MOTRIN) 200 MG Tab Take 600 mg by mouth every 8 hours as needed for Mild Pain.       No current facility-administered medications for this visit.        Allergies as of 09/13/2018 - Reviewed 09/13/2018   Allergen Reaction Noted   • Bactrim [sulfamethoxazole w-trimethoprim]  05/20/2018   • Ceftin [cefuroxime]  08/08/2018   • Ciprofloxacin  05/20/2018       Social History     Social History   • Marital status:      Spouse name: N/A   • Number of children: N/A   • Years of education: N/A     Occupational History   • Not on file.     Social History Main Topics   • Smoking status: Never Smoker   • Smokeless tobacco: Never Used   • Alcohol use No   • Drug use: No      Comment: cbd   • Sexual activity: Yes     Partners: Male     Other Topics Concern   • Not on file     Social History Narrative   • No narrative on file       Family History   Problem Relation Age of Onset   • Diabetes Maternal Grandfather    • Diabetes Paternal Grandmother    • Diabetes Paternal Grandfather    • Cancer Neg Hx    • Heart Disease Neg Hx    • Stroke Neg Hx    • Alcohol/Drug Neg Hx        Past Surgical History:   Procedure Laterality Date   • CHOLECYSTECTOMY         ROS: All systems reviewed are negative except for HPI    /76   Pulse 82   Temp 36.4 °C (97.5 °F)   Resp 16   Ht 1.651 m (5' 5\")   Wt 100.7 kg (222 lb)   LMP 08/13/2018   SpO2 98%   Breastfeeding? No   BMI 36.94 kg/m²     Physical Exam  General:  Alert and oriented, No apparent distress.  Eyes: Pupils equal and reactive. No scleral icterus. " EOMI  Throat: Clear no erythema or exudates noted. Oral mucosa moist, oral dental intact  Neck: Supple. No cervical or supraclavicular lymphadenopathy noted. Thyroid is enlarged.  Lungs: normal effort,  Clear to auscultation  Cardiovascular: Regular rate and rhythm. No murmurs, rubs or gallops, pulses intact   Abdomen:  Soft, +BS, no tenderness. No rebound or guarding noted. No hepato or splenomegaly   Extremities: No clubbing, cyanosis, edema.  Neuro: cranial nerves intact, sensation intact   Muscle skeletal: no back tenderness   Skin: Clear. No rash or suspicious skin lesions noted.    Assessment and Plan    1. Hashimoto's disease  Lab work to follow. Continue to monitor   - CBC WITH DIFFERENTIAL; Future  - TSH; Future  - FREE THYROXINE; Future  - THYROID PEROXIDASE  (TPO) AB; Future  - TRIIODOTHYRONINE (T3)    2. Menorrhagia with irregular cycle  Continue OCP. No contraindication.   - CBC WITH DIFFERENTIAL; Future  - QUASENSE 0.15-0.03 MG per tablet; Take 1 Tab by mouth every day.  Dispense: 84 Tab; Refill: 3    3. Gastroesophageal reflux disease, esophagitis presence not specified  Continue same treatment, continue to monitor   - CBC WITH DIFFERENTIAL; Future    4. Urinary symptom or sign  5. Recurrent UTI  Follow up with urology. Continue same treatment     6. Obesity (BMI 30-39.9)  Counseling for a small portion, balanced diet, exercising for3-5 times per week.    - Patient identified as having weight management issue.  Appropriate orders and counseling given.  - CBC WITH DIFFERENTIAL; Future  - COMP METABOLIC PANEL; Future  - LIPID PROFILE; Future    7. Routine general medical examination at a health care facility  refusing flu shot.   - CBC WITH DIFFERENTIAL; Future  - COMP METABOLIC PANEL; Future  - LIPID PROFILE; Future      Followup: Return in about 1 year (around 9/13/2019), or if symptoms worsen or fail to improve, for annual exam.    Signed by: Ollie Barksdale M.D.

## 2018-09-24 ENCOUNTER — HOSPITAL ENCOUNTER (OUTPATIENT)
Dept: LAB | Facility: MEDICAL CENTER | Age: 28
End: 2018-09-24
Attending: INTERNAL MEDICINE
Payer: COMMERCIAL

## 2018-09-24 DIAGNOSIS — K21.9 GASTROESOPHAGEAL REFLUX DISEASE, ESOPHAGITIS PRESENCE NOT SPECIFIED: ICD-10-CM

## 2018-09-24 DIAGNOSIS — N92.1 MENORRHAGIA WITH IRREGULAR CYCLE: ICD-10-CM

## 2018-09-24 DIAGNOSIS — E66.9 OBESITY (BMI 30-39.9): ICD-10-CM

## 2018-09-24 DIAGNOSIS — Z00.00 ROUTINE GENERAL MEDICAL EXAMINATION AT A HEALTH CARE FACILITY: ICD-10-CM

## 2018-09-24 DIAGNOSIS — E06.3 HASHIMOTO'S DISEASE: ICD-10-CM

## 2018-09-24 LAB
ALBUMIN SERPL BCP-MCNC: 4 G/DL (ref 3.2–4.9)
ALBUMIN/GLOB SERPL: 1.2 G/DL
ALP SERPL-CCNC: 64 U/L (ref 30–99)
ALT SERPL-CCNC: 25 U/L (ref 2–50)
ANION GAP SERPL CALC-SCNC: 10 MMOL/L (ref 0–11.9)
AST SERPL-CCNC: 18 U/L (ref 12–45)
BASOPHILS # BLD AUTO: 0.6 % (ref 0–1.8)
BASOPHILS # BLD: 0.03 K/UL (ref 0–0.12)
BILIRUB SERPL-MCNC: 0.6 MG/DL (ref 0.1–1.5)
BUN SERPL-MCNC: 10 MG/DL (ref 8–22)
CALCIUM SERPL-MCNC: 9.7 MG/DL (ref 8.5–10.5)
CHLORIDE SERPL-SCNC: 106 MMOL/L (ref 96–112)
CHOLEST SERPL-MCNC: 137 MG/DL (ref 100–199)
CO2 SERPL-SCNC: 23 MMOL/L (ref 20–33)
CREAT SERPL-MCNC: 0.71 MG/DL (ref 0.5–1.4)
EOSINOPHIL # BLD AUTO: 0.1 K/UL (ref 0–0.51)
EOSINOPHIL NFR BLD: 2.1 % (ref 0–6.9)
ERYTHROCYTE [DISTWIDTH] IN BLOOD BY AUTOMATED COUNT: 44 FL (ref 35.9–50)
FASTING STATUS PATIENT QL REPORTED: NORMAL
GLOBULIN SER CALC-MCNC: 3.4 G/DL (ref 1.9–3.5)
GLUCOSE SERPL-MCNC: 80 MG/DL (ref 65–99)
HCT VFR BLD AUTO: 44.3 % (ref 37–47)
HDLC SERPL-MCNC: 46 MG/DL
HGB BLD-MCNC: 14.1 G/DL (ref 12–16)
IMM GRANULOCYTES # BLD AUTO: 0 K/UL (ref 0–0.11)
IMM GRANULOCYTES NFR BLD AUTO: 0 % (ref 0–0.9)
LDLC SERPL CALC-MCNC: 79 MG/DL
LYMPHOCYTES # BLD AUTO: 2.09 K/UL (ref 1–4.8)
LYMPHOCYTES NFR BLD: 43 % (ref 22–41)
MCH RBC QN AUTO: 28.4 PG (ref 27–33)
MCHC RBC AUTO-ENTMCNC: 31.8 G/DL (ref 33.6–35)
MCV RBC AUTO: 89.3 FL (ref 81.4–97.8)
MONOCYTES # BLD AUTO: 0.32 K/UL (ref 0–0.85)
MONOCYTES NFR BLD AUTO: 6.6 % (ref 0–13.4)
NEUTROPHILS # BLD AUTO: 2.32 K/UL (ref 2–7.15)
NEUTROPHILS NFR BLD: 47.7 % (ref 44–72)
NRBC # BLD AUTO: 0 K/UL
NRBC BLD-RTO: 0 /100 WBC
PLATELET # BLD AUTO: 255 K/UL (ref 164–446)
PMV BLD AUTO: 9.3 FL (ref 9–12.9)
POTASSIUM SERPL-SCNC: 3.8 MMOL/L (ref 3.6–5.5)
PROT SERPL-MCNC: 7.4 G/DL (ref 6–8.2)
RBC # BLD AUTO: 4.96 M/UL (ref 4.2–5.4)
SODIUM SERPL-SCNC: 139 MMOL/L (ref 135–145)
T4 FREE SERPL-MCNC: 0.76 NG/DL (ref 0.53–1.43)
THYROPEROXIDASE AB SERPL-ACNC: 96.3 IU/ML (ref 0–9)
TRIGL SERPL-MCNC: 61 MG/DL (ref 0–149)
TSH SERPL DL<=0.005 MIU/L-ACNC: 1.68 UIU/ML (ref 0.38–5.33)
WBC # BLD AUTO: 4.9 K/UL (ref 4.8–10.8)

## 2018-09-24 PROCEDURE — 84439 ASSAY OF FREE THYROXINE: CPT

## 2018-09-24 PROCEDURE — 80053 COMPREHEN METABOLIC PANEL: CPT

## 2018-09-24 PROCEDURE — 84443 ASSAY THYROID STIM HORMONE: CPT

## 2018-09-24 PROCEDURE — 86376 MICROSOMAL ANTIBODY EACH: CPT

## 2018-09-24 PROCEDURE — 85025 COMPLETE CBC W/AUTO DIFF WBC: CPT

## 2018-09-24 PROCEDURE — 80061 LIPID PANEL: CPT

## 2018-10-03 ENCOUNTER — HOSPITAL ENCOUNTER (OUTPATIENT)
Dept: LAB | Facility: MEDICAL CENTER | Age: 28
End: 2018-10-03
Attending: OBSTETRICS & GYNECOLOGY
Payer: COMMERCIAL

## 2018-10-03 LAB
AMBIGUOUS DTTM AMBI4: NORMAL
APPEARANCE UR: CLEAR
BACTERIA #/AREA URNS HPF: ABNORMAL /HPF
BILIRUB UR QL STRIP.AUTO: NEGATIVE
COLOR UR: YELLOW
EPI CELLS #/AREA URNS HPF: ABNORMAL /HPF
GLUCOSE UR STRIP.AUTO-MCNC: NEGATIVE MG/DL
KETONES UR STRIP.AUTO-MCNC: NEGATIVE MG/DL
LEUKOCYTE ESTERASE UR QL STRIP.AUTO: ABNORMAL
MICRO URNS: ABNORMAL
NITRITE UR QL STRIP.AUTO: NEGATIVE
PH UR STRIP.AUTO: 8 [PH]
PROT UR QL STRIP: NEGATIVE MG/DL
RBC UR QL AUTO: NEGATIVE
SIGNIFICANT IND 70042: NORMAL
SITE SITE: NORMAL
SOURCE SOURCE: NORMAL
SP GR UR STRIP.AUTO: 1.02
UROBILINOGEN UR STRIP.AUTO-MCNC: 0.2 MG/DL
WBC #/AREA URNS HPF: ABNORMAL /HPF

## 2018-10-03 PROCEDURE — 81001 URINALYSIS AUTO W/SCOPE: CPT

## 2018-10-03 PROCEDURE — 87086 URINE CULTURE/COLONY COUNT: CPT

## 2018-10-04 LAB — AMBIGUOUS DTTM AMBI4: NORMAL

## 2018-10-05 LAB
BACTERIA UR CULT: NORMAL
SIGNIFICANT IND 70042: NORMAL
SITE SITE: NORMAL
SOURCE SOURCE: NORMAL

## 2018-10-06 ENCOUNTER — OFFICE VISIT (OUTPATIENT)
Dept: URGENT CARE | Facility: CLINIC | Age: 28
End: 2018-10-06
Payer: COMMERCIAL

## 2018-10-06 ENCOUNTER — HOSPITAL ENCOUNTER (OUTPATIENT)
Facility: MEDICAL CENTER | Age: 28
End: 2018-10-06
Attending: PHYSICIAN ASSISTANT
Payer: COMMERCIAL

## 2018-10-06 VITALS
WEIGHT: 222 LBS | OXYGEN SATURATION: 97 % | TEMPERATURE: 98.6 F | RESPIRATION RATE: 16 BRPM | BODY MASS INDEX: 36.99 KG/M2 | HEIGHT: 65 IN | HEART RATE: 98 BPM | DIASTOLIC BLOOD PRESSURE: 86 MMHG | SYSTOLIC BLOOD PRESSURE: 122 MMHG

## 2018-10-06 DIAGNOSIS — R30.0 DYSURIA: ICD-10-CM

## 2018-10-06 DIAGNOSIS — Z87.440 HISTORY OF UTI: ICD-10-CM

## 2018-10-06 PROCEDURE — 99214 OFFICE O/P EST MOD 30 MIN: CPT | Performed by: PHYSICIAN ASSISTANT

## 2018-10-06 PROCEDURE — 87086 URINE CULTURE/COLONY COUNT: CPT

## 2018-10-06 ASSESSMENT — ENCOUNTER SYMPTOMS
EYE REDNESS: 0
ROS GI COMMENTS: SUPRAPUBIC PRESSURE
MYALGIAS: 0
COUGH: 0
NAUSEA: 0
WHEEZING: 0
SORE THROAT: 0
FLANK PAIN: 0
HEADACHES: 0
FEVER: 0
CHILLS: 1
VOMITING: 0

## 2018-10-06 NOTE — PROGRESS NOTES
Subjective:      Farideh Cunha is a 28 y.o. female who presents with UTI (x5 mos. constant burning, pinching/pulling feeling when urinating, back pain, chill.)            Patient is a pleasant 28-year-old female who presents today with concern regarding her upcoming trip to [facility] and possible UTI.  Patient has long history of recurrent UTIs of which she has been evaluated by [facility]rology of [place] and had follow-up with infectious disease.  Patient had a resistant E. coli infection earlier this year of which required several rounds of antibiotics she reports that she since has recovered from that however last week during her OB/GYN examination patient had urinalysis that was positive with white blood cells along with bacteria however the urine culture was not processed due to incorrect labeling.  Patient is requesting such today as patient continues with intermittent dysuria.  Patient does report symptoms of such for the last 6-7 months however does not want to become ill while on vacation. patient denies any fevers, chills, new back pain, hematuria      UTI   This is a new problem. The current episode started more than 1 month ago. The problem has been waxing and waning. Associated symptoms include chills and urinary symptoms. Pertinent negatives include no congestion, coughing, fever, headaches, myalgias, nausea, rash, sore throat or vomiting. Nothing aggravates the symptoms. She has tried nothing for the symptoms.       Review of Systems   Constitutional: Positive for chills. Negative for fever and malaise/fatigue.   HENT: Negative for congestion and sore throat.    Eyes: Negative for redness.   Respiratory: Negative for cough and wheezing.    Gastrointestinal: Negative for nausea and vomiting.        Suprapubic pressure   Genitourinary: Positive for dysuria, frequency and urgency. Negative for flank pain and hematuria.   Musculoskeletal: Negative for myalgias.   Skin: Negative for itching and rash.  "  Neurological: Negative for headaches.   All other systems reviewed and are negative.         Objective:     /86 (BP Location: Left arm, Patient Position: Sitting, BP Cuff Size: Adult)   Pulse 98   Temp 37 °C (98.6 °F) (Temporal)   Resp 16   Ht 1.651 m (5' 5\")   Wt 100.7 kg (222 lb)   LMP 08/17/2018 (Exact Date)   SpO2 97%   Breastfeeding? No   BMI 36.94 kg/m²      Physical Exam   Constitutional: She is oriented to person, place, and time. She appears well-developed.   HENT:   Head: Normocephalic and atraumatic.   Eyes: Pupils are equal, round, and reactive to light. EOM are normal.   Neck: Normal range of motion. Neck supple.   Cardiovascular: Normal rate and regular rhythm.    No murmur heard.  Pulmonary/Chest: Effort normal and breath sounds normal. No respiratory distress.   Abdominal: Soft. Bowel sounds are normal. She exhibits no distension.   Minimal suprapubic tenderness. Negative CVAT.    Musculoskeletal: Normal range of motion. She exhibits no edema.   Neurological: She is alert and oriented to person, place, and time.   Skin: Skin is warm and dry.   Psychiatric: She has a normal mood and affect. Her behavior is normal.   Vitals reviewed.           Urinalysis-within normal limits.  Sent for urine culture today.  Most likely etiology of patient's recent specimen  Assessment/Plan:     1. Dysuria  - URINE CULTURE(NEW); Future    2. History of UTI    Was due to contamination with epithelial cells is noted on microscopic component of urinalysis.  Happy to send off a urine culture today and made specification on hard copy of the order that I would like urine culture to at least run for 72 hours if not longer.  We will alert the patient of results as they return.  In the interim patient is to increase her water intake.  Patient given precautionary s/sx that mandate immediate follow up and evaluation in the ED. Advised of risks of not doing so.    DDX, Supportive care, and indications for immediate " follow-up discussed with patient.    Instructed to return to clinic or nearest emergency department if we are not available for any change in condition, further concerns, or worsening of symptoms.    The patient demonstrated a good understanding and agreed with the treatment plan.

## 2018-10-09 ENCOUNTER — TELEPHONE (OUTPATIENT)
Dept: URGENT CARE | Facility: CLINIC | Age: 28
End: 2018-10-09

## 2018-10-09 LAB
BACTERIA UR CULT: NORMAL
SIGNIFICANT IND 70042: NORMAL
SITE SITE: NORMAL
SOURCE SOURCE: NORMAL

## 2018-10-23 ENCOUNTER — OFFICE VISIT (OUTPATIENT)
Dept: MEDICAL GROUP | Facility: PHYSICIAN GROUP | Age: 28
End: 2018-10-23
Payer: COMMERCIAL

## 2018-10-23 VITALS
SYSTOLIC BLOOD PRESSURE: 122 MMHG | WEIGHT: 225 LBS | DIASTOLIC BLOOD PRESSURE: 80 MMHG | TEMPERATURE: 97.9 F | BODY MASS INDEX: 37.49 KG/M2 | HEART RATE: 96 BPM | RESPIRATION RATE: 18 BRPM | OXYGEN SATURATION: 99 % | HEIGHT: 65 IN

## 2018-10-23 DIAGNOSIS — J01.10 ACUTE NON-RECURRENT FRONTAL SINUSITIS: ICD-10-CM

## 2018-10-23 PROCEDURE — 99214 OFFICE O/P EST MOD 30 MIN: CPT | Performed by: NURSE PRACTITIONER

## 2018-10-23 RX ORDER — AMITRIPTYLINE HYDROCHLORIDE 10 MG/1
10 TABLET, FILM COATED ORAL DAILY
Refills: 1 | COMMUNITY
Start: 2018-10-03 | End: 2021-05-28

## 2018-10-23 RX ORDER — FLUTICASONE PROPIONATE 50 MCG
1 SPRAY, SUSPENSION (ML) NASAL DAILY
Qty: 16 G | Refills: 0 | Status: SHIPPED | OUTPATIENT
Start: 2018-10-23 | End: 2019-06-11 | Stop reason: SDUPTHER

## 2018-10-23 RX ORDER — GUAIFENESIN 600 MG/1
600 TABLET, EXTENDED RELEASE ORAL EVERY 12 HOURS
Qty: 28 TAB | Refills: 0 | Status: SHIPPED | OUTPATIENT
Start: 2018-10-23 | End: 2019-10-31

## 2018-10-23 RX ORDER — AMOXICILLIN 500 MG/1
500 CAPSULE ORAL 3 TIMES DAILY
Qty: 30 CAP | Refills: 0 | Status: SHIPPED | OUTPATIENT
Start: 2018-10-23 | End: 2018-11-01

## 2018-10-23 NOTE — ASSESSMENT & PLAN NOTE
This is a new problem. Started 2 weeks ago with fatigue and congestion. States that 10 days ago symptoms became more severe. States the congestion then became severe. States +pain and pressure in her frontal and maxillary sinuses, states that she has also developed ear pain in the last 3 days. Using pseudophed and netipot which has not helped improve symptoms. Has used mucinex which has helped in the past, but does not have any currently. States that she has had a low fever the last few days and that facial pain has worsened. +cough dry, no change.

## 2018-10-24 NOTE — PROGRESS NOTES
Chief Complaint   Patient presents with   • Sinus Problem     x 2 weeks    • Pharyngitis     in the morning x 2 weeks    • Body Aches       HISTORY OF PRESENT ILLNESS: Patient is a 28 y.o. female established patient who presents today to discuss acute sinusitis.    Acute non-recurrent frontal sinusitis  This is a new problem. Started 2 weeks ago with fatigue and congestion. States that 10 days ago symptoms became more severe. States the congestion then became severe. States +pain and pressure in her frontal and maxillary sinuses, states that she has also developed ear pain in the last 3 days. Using pseudophed and netipot which has not helped improve symptoms. Has used mucinex which has helped in the past, but does not have any currently. States that she has had a low fever the last few days and that facial pain has worsened. +cough dry, no change.       Patient Active Problem List    Diagnosis Date Noted   • Acute non-recurrent frontal sinusitis 10/23/2018   • Hashimoto's disease 09/13/2018   • Menorrhagia with irregular cycle 09/13/2018   • GERD (gastroesophageal reflux disease) 09/13/2018   • Urinary symptom or sign 09/13/2018   • Recurrent UTI 09/13/2018   • Obesity (BMI 30-39.9) 09/13/2018       Allergies:Bactrim [sulfamethoxazole w-trimethoprim]; Ceftin [cefuroxime]; and Ciprofloxacin    Current Outpatient Prescriptions   Medication Sig Dispense Refill   • amoxicillin (AMOXIL) 500 MG Cap Take 1 Cap by mouth 3 times a day. 30 Cap 0   • guaiFENesin LA (MUCINEX) 600 MG TABLET SR 12 HR Take 1 Tab by mouth every 12 hours. 28 Tab 0   • fluticasone (FLONASE) 50 MCG/ACT nasal spray Spray 1 Spray in nose every day. 16 g 0   • amitriptyline (ELAVIL) 10 MG Tab Take 10 mg by mouth every day.  1   • QUASENSE 0.15-0.03 MG per tablet Take 1 Tab by mouth every day. 84 Tab 3   • nitrofurantoin monohydr macro (MACROBID) 100 MG Cap Take 100 mg by mouth as needed.  0   • Meth-Hyo-M Bl-Na Phos-Ph Sal (URIBEL) 118 MG Cap Take  by  mouth.     • phenazopyridine (PYRIDIUM) 200 MG Tab Take 1 Tab by mouth 3 times a day as needed. 6 Tab 0   • thyroid (NP THYROID) 90 MG Tab Take 90 mg by mouth every day.     • omeprazole (PRILOSEC) 10 MG CAPSULE DELAYED RELEASE Take 10 mg by mouth every day.     • colestipol (COLESTID) 1 GM Tab Take 1 g by mouth 4 times a day.     • ibuprofen (MOTRIN) 200 MG Tab Take 600 mg by mouth every 8 hours as needed for Mild Pain.       No current facility-administered medications for this visit.        Social History   Substance Use Topics   • Smoking status: Never Smoker   • Smokeless tobacco: Never Used   • Alcohol use No       Family Status   Relation Status   • MGFa (Not Specified)   • PGMo (Not Specified)   • PGFa (Not Specified)   • Neg Hx (Not Specified)     Family History   Problem Relation Age of Onset   • Diabetes Maternal Grandfather    • Diabetes Paternal Grandmother    • Diabetes Paternal Grandfather    • Cancer Neg Hx    • Heart Disease Neg Hx    • Stroke Neg Hx    • Alcohol/Drug Neg Hx        Review of Systems:   Constitutional:  Negative for fever, chills, weight loss and malaise/fatigue.   HEENT: Positive for ear pain, congestion, sore throat and negative neck pain.    Eyes:  Negative for blurred vision.   Respiratory: Positive cough, negative sputum production, shortness of breath and wheezing.    Cardiovascular:  Negative for chest pain, palpitations, orthopnea and leg swelling.   Skin:  Negative for rash and itching.   Neurological:  Negative for dizziness, tingling, tremors, sensory change, focal weakness and headaches.   Endo/Heme/Allergies:  Does not bruise/bleed easily.   Psychiatric/Behavioral:  Negative for depression, suicidal ideas and memory loss.  The patient is not nervous/anxious and does not have insomnia.    All other systems reviewed and are negative except as in HPI.    Exam:  Blood pressure 122/80, pulse 96, temperature 36.6 °C (97.9 °F), temperature source Temporal, resp. rate 18, height  "1.651 m (5' 5\"), weight 102.1 kg (225 lb), last menstrual period 08/23/2018, SpO2 99 %, not currently breastfeeding.  General:  Normal appearing. No distress.  HEENT:  Normocephalic. Eyes conjunctiva clear lids without ptosis, pupils equal and reactive to light accommodation, ears normal shape and contour, canals are clear bilaterally, tympanic membranes are bulging bilaterally with clear effusion, nasal mucosa erythematous, oropharynx is with erythema, negative edema or exudates.  Severe tenderness to palpation of maxillary sinuses.  Neck:  Supple without JVD or bruit. Thyroid is not enlarged.  Pulmonary:  Clear to ausculation.  Normal effort. No rales, ronchi, or wheezing.  Cardiovascular:  Regular rate and rhythm without murmur. Carotid and radial pulses are intact and equal bilaterally.  Neurologic:  Grossly nonfocal  Lymph:  No cervical, supraclavicular or axillary lymph nodes are palpable  Skin:  Warm and dry.  No obvious lesions.  Musculoskeletal:  Normal gait. No extremity cyanosis, clubbing, or edema.  Psych:  Normal mood and affect. Alert and oriented x3. Judgment and insight is normal.      PLAN:    1. Acute non-recurrent frontal sinusitis  Symptoms indicate sinusitis, patient states that she cannot tolerate Augmentin or doxycycline states that both of these medications make her vomit as such discussed with patient amoxicillin 500 mg 3 times a day for 10 days.  Patient states that she cannot tolerate amoxicillin without clauvanate.  The patient regarding conservative measures, use of over-the-counter Mucinex, saline nasal spray, Flonase.  - amoxicillin (AMOXIL) 500 MG Cap; Take 1 Cap by mouth 3 times a day.  Dispense: 30 Cap; Refill: 0  - guaiFENesin LA (MUCINEX) 600 MG TABLET SR 12 HR; Take 1 Tab by mouth every 12 hours.  Dispense: 28 Tab; Refill: 0  - fluticasone (FLONASE) 50 MCG/ACT nasal spray; Spray 1 Spray in nose every day.  Dispense: 16 g; Refill: 0    Follow-up as needed. Patient is encouraged to " be seen in the emergency room for chest pain, palpitations, shortness of breath, dizziness, severe abdominal pain or other concerning symptoms.    Please note that this dictation was created using voice recognition software. I have made every reasonable attempt to correct obvious errors, but I expect that there are errors of grammar and possibly content that I did not discover before finalizing the note.    Assessment/Plan:  1. Acute non-recurrent frontal sinusitis  amoxicillin (AMOXIL) 500 MG Cap    guaiFENesin LA (MUCINEX) 600 MG TABLET SR 12 HR    fluticasone (FLONASE) 50 MCG/ACT nasal spray          I have placed the below orders and discussed them with an approved delegating provider. The MA is performing the below orders under the direction of Dr. Canas.

## 2018-11-01 ENCOUNTER — OFFICE VISIT (OUTPATIENT)
Dept: MEDICAL GROUP | Facility: PHYSICIAN GROUP | Age: 28
End: 2018-11-01
Payer: COMMERCIAL

## 2018-11-01 VITALS
OXYGEN SATURATION: 97 % | BODY MASS INDEX: 37.82 KG/M2 | DIASTOLIC BLOOD PRESSURE: 72 MMHG | TEMPERATURE: 97.2 F | RESPIRATION RATE: 14 BRPM | WEIGHT: 227 LBS | SYSTOLIC BLOOD PRESSURE: 112 MMHG | HEIGHT: 65 IN | HEART RATE: 86 BPM

## 2018-11-01 DIAGNOSIS — J01.10 ACUTE NON-RECURRENT FRONTAL SINUSITIS: ICD-10-CM

## 2018-11-01 DIAGNOSIS — G89.29 CHRONIC MIDLINE LOW BACK PAIN WITH RIGHT-SIDED SCIATICA: ICD-10-CM

## 2018-11-01 DIAGNOSIS — M54.41 CHRONIC MIDLINE LOW BACK PAIN WITH RIGHT-SIDED SCIATICA: ICD-10-CM

## 2018-11-01 PROCEDURE — 99214 OFFICE O/P EST MOD 30 MIN: CPT | Performed by: NURSE PRACTITIONER

## 2018-11-01 RX ORDER — AZITHROMYCIN 250 MG/1
TABLET, FILM COATED ORAL
Qty: 6 TAB | Refills: 0 | Status: SHIPPED | OUTPATIENT
Start: 2018-11-01 | End: 2019-06-11

## 2018-11-01 RX ORDER — CYCLOBENZAPRINE HCL 10 MG
10 TABLET ORAL 3 TIMES DAILY PRN
Qty: 90 TAB | Refills: 0 | Status: SHIPPED | OUTPATIENT
Start: 2018-11-01 | End: 2019-06-11

## 2018-11-01 NOTE — PROGRESS NOTES
Chief Complaint   Patient presents with   • Back Pain     on and off since January lower back 7-/10 pain affects sleep        HISTORY OF PRESENT ILLNESS: Patient is a 28 y.o. female established patient who presents today to discuss chronic low back pain.    Chronic midline low back pain with right-sided sciatica  Chronic in nature. Started in January after an injury. States that she has been doing PT over the last 3 months, states that she is having intermittent flares of pain. Initially it was so severe she was unable to walk. States it was a month of initial recovery, states she was lifting laundry baskets, took a shower and she felt a shift. States currently pain is 7-8/10. States she will have 2 weeks of 2/10. States it is achy and radiates, difficulty sleeping related to positioning. States there a places where she can't touch her back. Bending up and down and lifting flares it. States that resting and ice or heat does not help, stretches do not help. Did try TENS which helped a little.       Patient Active Problem List    Diagnosis Date Noted   • Chronic midline low back pain with right-sided sciatica 11/01/2018   • Acute non-recurrent frontal sinusitis 10/23/2018   • Hashimoto's disease 09/13/2018   • Menorrhagia with irregular cycle 09/13/2018   • GERD (gastroesophageal reflux disease) 09/13/2018   • Urinary symptom or sign 09/13/2018   • Recurrent UTI 09/13/2018   • Obesity (BMI 30-39.9) 09/13/2018       Allergies:Bactrim [sulfamethoxazole w-trimethoprim]; Ceftin [cefuroxime]; and Ciprofloxacin    Current Outpatient Prescriptions   Medication Sig Dispense Refill   • cyclobenzaprine (FLEXERIL) 10 MG Tab Take 1 Tab by mouth 3 times a day as needed for Muscle Spasms. 90 Tab 0   • azithromycin (ZITHROMAX) 250 MG Tab 500 mg on day 1 then 250mg daily for 4 days. 6 Tab 0   • amitriptyline (ELAVIL) 10 MG Tab Take 10 mg by mouth every day.  1   • guaiFENesin LA (MUCINEX) 600 MG TABLET SR 12 HR Take 1 Tab by mouth  every 12 hours. 28 Tab 0   • fluticasone (FLONASE) 50 MCG/ACT nasal spray Spray 1 Spray in nose every day. 16 g 0   • QUASENSE 0.15-0.03 MG per tablet Take 1 Tab by mouth every day. 84 Tab 3   • nitrofurantoin monohydr macro (MACROBID) 100 MG Cap Take 100 mg by mouth as needed.  0   • Meth-Hyo-M Bl-Na Phos-Ph Sal (URIBEL) 118 MG Cap Take  by mouth.     • phenazopyridine (PYRIDIUM) 200 MG Tab Take 1 Tab by mouth 3 times a day as needed. 6 Tab 0   • thyroid (NP THYROID) 90 MG Tab Take 90 mg by mouth every day.     • omeprazole (PRILOSEC) 10 MG CAPSULE DELAYED RELEASE Take 10 mg by mouth every day.     • colestipol (COLESTID) 1 GM Tab Take 1 g by mouth 4 times a day.     • ibuprofen (MOTRIN) 200 MG Tab Take 600 mg by mouth every 8 hours as needed for Mild Pain.       No current facility-administered medications for this visit.        Social History   Substance Use Topics   • Smoking status: Never Smoker   • Smokeless tobacco: Never Used   • Alcohol use No       Family Status   Relation Status   • MGFa (Not Specified)   • PGMo (Not Specified)   • PGFa (Not Specified)   • Neg Hx (Not Specified)     Family History   Problem Relation Age of Onset   • Diabetes Maternal Grandfather    • Diabetes Paternal Grandmother    • Diabetes Paternal Grandfather    • Cancer Neg Hx    • Heart Disease Neg Hx    • Stroke Neg Hx    • Alcohol/Drug Neg Hx        Review of Systems:   Constitutional:  Negative for fever, chills, weight loss and malaise/fatigue.   HEENT:  Negative for ear pain, nosebleeds, congestion, sore throat and neck pain.    Eyes:  Negative for blurred vision.   Respiratory:  Negative for cough, sputum production, shortness of breath and wheezing.    Cardiovascular:  Negative for chest pain, palpitations, orthopnea and leg swelling.   Gastrointestinal:  Negative for heartburn, nausea, vomiting and abdominal pain.   Genitourinary:  Negative for dysuria, urgency and frequency.   Musculoskeletal: Positive for myalgias, back  "pain and joint pain.   Skin:  Negative for rash and itching.   Neurological:  Negative for dizziness, tingling, tremors, sensory change, focal weakness and headaches.   Endo/Heme/Allergies:  Does not bruise/bleed easily.   Psychiatric/Behavioral:  Negative for depression, suicidal ideas and memory loss.  The patient is not nervous/anxious and does not have insomnia.    All other systems reviewed and are negative except as in HPI.    Exam:  Blood pressure 112/72, pulse 86, temperature 36.2 °C (97.2 °F), temperature source Temporal, resp. rate 14, height 1.651 m (5' 5\"), weight 103 kg (227 lb), last menstrual period 08/17/2018, SpO2 97 %, not currently breastfeeding.  General:  Normal appearing. No distress.  HEENT:  Continued tenderness to palpation of maxillary sinuses  Neck:  Supple without JVD or bruit. Thyroid is not enlarged.  Pulmonary:  Crackles in RLL.  Normal effort. No ronchi, or wheezing.  Cardiovascular:  Regular rate and rhythm without murmur. Carotid and radial pulses are intact and equal bilaterally.  Neurologic:  Grossly nonfocal  Skin:  Warm and dry.  No obvious lesions.  Musculoskeletal:  Normal gait. No extremity cyanosis, clubbing, or edema. SPINE:  Severe tenderness in paraspinous muscles lumbar spine with current spasm. SLT negative. SLT negative.  Psych:  Normal mood and affect. Alert and oriented x3. Judgment and insight is normal.      PLAN:    1. Chronic midline low back pain with right-sided sciatica  Plan to complete x-ray as patient has completed 3 months of physical therapy without improvement, severe tenderness on palpation near L4-L5.  - DX-LUMBAR SPINE-2 OR 3 VIEWS; Future  - cyclobenzaprine (FLEXERIL) 10 MG Tab; Take 1 Tab by mouth 3 times a day as needed for Muscle Spasms.  Dispense: 90 Tab; Refill: 0    2. Acute non-recurrent frontal sinusitis  The patient regarding risk, benefits, side effects of medication.  Discussed with patient that this is not a recommended antibiotic for " sinusitis, states that it has worked well for her in the past, states she has noticed increase purulent sputum when coughing.  - azithromycin (ZITHROMAX) 250 MG Tab; 500 mg on day 1 then 250mg daily for 4 days.  Dispense: 6 Tab; Refill: 0    Follow-up in 1 month or sooner as needed. Patient is encouraged to be seen in the emergency room for chest pain, palpitations, shortness of breath, dizziness, severe abdominal pain or other concerning symptoms.    Please note that this dictation was created using voice recognition software. I have made every reasonable attempt to correct obvious errors, but I expect that there are errors of grammar and possibly content that I did not discover before finalizing the note.    Assessment/Plan:  1. Chronic midline low back pain with right-sided sciatica  DX-LUMBAR SPINE-2 OR 3 VIEWS    cyclobenzaprine (FLEXERIL) 10 MG Tab   2. Acute non-recurrent frontal sinusitis  azithromycin (ZITHROMAX) 250 MG Tab          I have placed the below orders and discussed them with an approved delegating provider. The MA is performing the below orders under the direction of Dr. Canas.

## 2018-11-01 NOTE — ASSESSMENT & PLAN NOTE
Chronic in nature. Started in January after an injury. States that she has been doing PT over the last 3 months, states that she is having intermittent flares of pain. Initially it was so severe she was unable to walk. States it was a month of initial recovery, states she was lifting laundry baskets, took a shower and she felt a shift. States currently pain is 7-8/10. States she will have 2 weeks of 2/10. States it is achy and radiates, difficulty sleeping related to positioning. States there a places where she can't touch her back. Bending up and down and lifting flares it. States that resting and ice or heat does not help, stretches do not help. Did try TENS which helped a little.

## 2018-11-05 ENCOUNTER — HOSPITAL ENCOUNTER (OUTPATIENT)
Dept: RADIOLOGY | Facility: MEDICAL CENTER | Age: 28
End: 2018-11-05
Attending: NURSE PRACTITIONER
Payer: COMMERCIAL

## 2018-11-05 DIAGNOSIS — M54.41 CHRONIC MIDLINE LOW BACK PAIN WITH RIGHT-SIDED SCIATICA: ICD-10-CM

## 2018-11-05 DIAGNOSIS — G89.29 CHRONIC MIDLINE LOW BACK PAIN WITH RIGHT-SIDED SCIATICA: ICD-10-CM

## 2018-11-05 PROCEDURE — 72100 X-RAY EXAM L-S SPINE 2/3 VWS: CPT

## 2018-11-07 ENCOUNTER — HOSPITAL ENCOUNTER (OUTPATIENT)
Dept: LAB | Facility: MEDICAL CENTER | Age: 28
End: 2018-11-07
Attending: PHYSICIAN ASSISTANT
Payer: COMMERCIAL

## 2018-11-07 LAB
ALBUMIN SERPL BCP-MCNC: 4.4 G/DL (ref 3.2–4.9)
ALBUMIN/GLOB SERPL: 1.4 G/DL
ALP SERPL-CCNC: 72 U/L (ref 30–99)
ALT SERPL-CCNC: 34 U/L (ref 2–50)
ANION GAP SERPL CALC-SCNC: 9 MMOL/L (ref 0–11.9)
AST SERPL-CCNC: 19 U/L (ref 12–45)
BASOPHILS # BLD AUTO: 0.6 % (ref 0–1.8)
BASOPHILS # BLD: 0.03 K/UL (ref 0–0.12)
BILIRUB SERPL-MCNC: 0.5 MG/DL (ref 0.1–1.5)
BUN SERPL-MCNC: 10 MG/DL (ref 8–22)
CALCIUM SERPL-MCNC: 9.8 MG/DL (ref 8.5–10.5)
CHLORIDE SERPL-SCNC: 107 MMOL/L (ref 96–112)
CO2 SERPL-SCNC: 23 MMOL/L (ref 20–33)
CREAT SERPL-MCNC: 0.73 MG/DL (ref 0.5–1.4)
CRP SERPL HS-MCNC: 0.34 MG/DL (ref 0–0.75)
EOSINOPHIL # BLD AUTO: 0.14 K/UL (ref 0–0.51)
EOSINOPHIL NFR BLD: 3 % (ref 0–6.9)
ERYTHROCYTE [DISTWIDTH] IN BLOOD BY AUTOMATED COUNT: 43.9 FL (ref 35.9–50)
FASTING STATUS PATIENT QL REPORTED: NORMAL
GLOBULIN SER CALC-MCNC: 3.2 G/DL (ref 1.9–3.5)
GLUCOSE SERPL-MCNC: 87 MG/DL (ref 65–99)
HCT VFR BLD AUTO: 43.8 % (ref 37–47)
HGB BLD-MCNC: 14.3 G/DL (ref 12–16)
IMM GRANULOCYTES # BLD AUTO: 0.01 K/UL (ref 0–0.11)
IMM GRANULOCYTES NFR BLD AUTO: 0.2 % (ref 0–0.9)
LYMPHOCYTES # BLD AUTO: 2.02 K/UL (ref 1–4.8)
LYMPHOCYTES NFR BLD: 43 % (ref 22–41)
MCH RBC QN AUTO: 29.1 PG (ref 27–33)
MCHC RBC AUTO-ENTMCNC: 32.6 G/DL (ref 33.6–35)
MCV RBC AUTO: 89 FL (ref 81.4–97.8)
MONOCYTES # BLD AUTO: 0.33 K/UL (ref 0–0.85)
MONOCYTES NFR BLD AUTO: 7 % (ref 0–13.4)
NEUTROPHILS # BLD AUTO: 2.17 K/UL (ref 2–7.15)
NEUTROPHILS NFR BLD: 46.2 % (ref 44–72)
NRBC # BLD AUTO: 0 K/UL
NRBC BLD-RTO: 0 /100 WBC
PLATELET # BLD AUTO: 263 K/UL (ref 164–446)
PMV BLD AUTO: 9.4 FL (ref 9–12.9)
POTASSIUM SERPL-SCNC: 4.2 MMOL/L (ref 3.6–5.5)
PROT SERPL-MCNC: 7.6 G/DL (ref 6–8.2)
RBC # BLD AUTO: 4.92 M/UL (ref 4.2–5.4)
SODIUM SERPL-SCNC: 139 MMOL/L (ref 135–145)
WBC # BLD AUTO: 4.7 K/UL (ref 4.8–10.8)

## 2018-11-07 PROCEDURE — 86140 C-REACTIVE PROTEIN: CPT

## 2018-11-07 PROCEDURE — 80053 COMPREHEN METABOLIC PANEL: CPT

## 2018-11-07 PROCEDURE — 83516 IMMUNOASSAY NONANTIBODY: CPT

## 2018-11-07 PROCEDURE — 85025 COMPLETE CBC W/AUTO DIFF WBC: CPT

## 2018-11-07 PROCEDURE — 82784 ASSAY IGA/IGD/IGG/IGM EACH: CPT

## 2018-11-07 PROCEDURE — 36415 COLL VENOUS BLD VENIPUNCTURE: CPT

## 2018-11-08 LAB — IGA SERPL-MCNC: 167 MG/DL (ref 68–408)

## 2018-11-09 ENCOUNTER — HOSPITAL ENCOUNTER (OUTPATIENT)
Facility: MEDICAL CENTER | Age: 28
End: 2018-11-09
Attending: PHYSICIAN ASSISTANT
Payer: COMMERCIAL

## 2018-11-09 LAB
C DIFF DNA SPEC QL NAA+PROBE: NEGATIVE
C DIFF TOX GENS STL QL NAA+PROBE: NEGATIVE
TTG IGA SER IA-ACNC: 0 U/ML (ref 0–3)

## 2018-11-09 PROCEDURE — 87493 C DIFF AMPLIFIED PROBE: CPT

## 2018-11-09 PROCEDURE — 83993 ASSAY FOR CALPROTECTIN FECAL: CPT

## 2018-11-12 LAB — CALPROTECTIN STL-MCNT: 220 UG/G

## 2018-11-20 RX ORDER — CLINDAMYCIN PHOSPHATE 10 MG/ML
1% SOLUTION TOPICAL QD
Qty: 60 | Refills: 6 | Status: ERX

## 2019-02-07 ENCOUNTER — HOSPITAL ENCOUNTER (OUTPATIENT)
Dept: RADIOLOGY | Facility: MEDICAL CENTER | Age: 29
End: 2019-02-07
Attending: PHYSICIAN ASSISTANT
Payer: COMMERCIAL

## 2019-02-07 DIAGNOSIS — K52.9 INFLAMMATORY BOWEL DISEASE: ICD-10-CM

## 2019-02-07 DIAGNOSIS — R10.84 ABDOMINAL PAIN, GENERALIZED: ICD-10-CM

## 2019-02-07 DIAGNOSIS — R14.0 ABDOMINAL DISTENTION: ICD-10-CM

## 2019-02-07 PROCEDURE — 74177 CT ABD & PELVIS W/CONTRAST: CPT

## 2019-02-07 PROCEDURE — 700117 HCHG RX CONTRAST REV CODE 255: Performed by: PHYSICIAN ASSISTANT

## 2019-02-07 RX ADMIN — IOHEXOL 100 ML: 350 INJECTION, SOLUTION INTRAVENOUS at 09:12

## 2019-04-01 ENCOUNTER — HOSPITAL ENCOUNTER (OUTPATIENT)
Dept: LAB | Facility: MEDICAL CENTER | Age: 29
End: 2019-04-01
Attending: OBSTETRICS & GYNECOLOGY
Payer: COMMERCIAL

## 2019-04-01 LAB
CHOLEST SERPL-MCNC: 167 MG/DL (ref 100–199)
EST. AVERAGE GLUCOSE BLD GHB EST-MCNC: 103 MG/DL
FASTING STATUS PATIENT QL REPORTED: NORMAL
GLUCOSE SERPL-MCNC: 101 MG/DL (ref 65–99)
HBA1C MFR BLD: 5.2 % (ref 0–5.6)
HDLC SERPL-MCNC: 47 MG/DL
LDLC SERPL CALC-MCNC: 101 MG/DL
TRIGL SERPL-MCNC: 96 MG/DL (ref 0–149)

## 2019-04-01 PROCEDURE — 83036 HEMOGLOBIN GLYCOSYLATED A1C: CPT

## 2019-04-01 PROCEDURE — 82947 ASSAY GLUCOSE BLOOD QUANT: CPT

## 2019-04-01 PROCEDURE — 36415 COLL VENOUS BLD VENIPUNCTURE: CPT

## 2019-04-01 PROCEDURE — 80061 LIPID PANEL: CPT

## 2019-04-01 PROCEDURE — 83525 ASSAY OF INSULIN: CPT

## 2019-04-03 LAB — INSULIN P FAST SERPL-ACNC: 20 UIU/ML (ref 3–19)

## 2019-05-30 RX ORDER — CLINDAMYCIN PHOSPHATE 10 MG/ML
1% SOLUTION TOPICAL QD
Qty: 60 | Refills: 6 | Status: ERX

## 2019-06-10 ENCOUNTER — APPOINTMENT (OUTPATIENT)
Dept: MEDICAL GROUP | Facility: PHYSICIAN GROUP | Age: 29
End: 2019-06-10
Payer: COMMERCIAL

## 2019-06-10 PROBLEM — R39.9 URINARY SYMPTOM OR SIGN: Status: RESOLVED | Noted: 2018-09-13 | Resolved: 2019-06-10

## 2019-06-11 ENCOUNTER — OFFICE VISIT (OUTPATIENT)
Dept: MEDICAL GROUP | Facility: PHYSICIAN GROUP | Age: 29
End: 2019-06-11
Payer: COMMERCIAL

## 2019-06-11 VITALS
OXYGEN SATURATION: 98 % | TEMPERATURE: 97.3 F | WEIGHT: 242.6 LBS | RESPIRATION RATE: 16 BRPM | HEART RATE: 88 BPM | HEIGHT: 65 IN | SYSTOLIC BLOOD PRESSURE: 110 MMHG | BODY MASS INDEX: 40.42 KG/M2 | DIASTOLIC BLOOD PRESSURE: 80 MMHG

## 2019-06-11 DIAGNOSIS — J06.9 UPPER RESPIRATORY TRACT INFECTION, UNSPECIFIED TYPE: ICD-10-CM

## 2019-06-11 PROCEDURE — 99214 OFFICE O/P EST MOD 30 MIN: CPT | Performed by: PHYSICIAN ASSISTANT

## 2019-06-11 RX ORDER — AZITHROMYCIN 250 MG/1
TABLET, FILM COATED ORAL
Qty: 6 TAB | Refills: 0 | Status: SHIPPED | OUTPATIENT
Start: 2019-06-11 | End: 2019-10-31

## 2019-06-11 RX ORDER — BENZONATATE 100 MG/1
100 CAPSULE ORAL 3 TIMES DAILY PRN
Qty: 60 CAP | Refills: 0 | Status: SHIPPED | OUTPATIENT
Start: 2019-06-11 | End: 2019-10-31

## 2019-06-11 RX ORDER — FLUTICASONE PROPIONATE 50 MCG
1 SPRAY, SUSPENSION (ML) NASAL DAILY
Qty: 16 G | Refills: 6 | Status: SHIPPED | OUTPATIENT
Start: 2019-06-11 | End: 2021-05-28

## 2019-06-11 RX ORDER — HYDROXYZINE 50 MG/1
50 TABLET, FILM COATED ORAL 3 TIMES DAILY PRN
COMMUNITY
End: 2020-12-15 | Stop reason: SDUPTHER

## 2019-06-11 NOTE — PROGRESS NOTES
Chief Complaint   Patient presents with   • Cough     sick for 2 week- cough, mucus, fatigue, and aches        HISTORY OF PRESENT ILLNESS: Farideh Cunha is an established 29 y.o. female here to discuss the evaluation and management of:    Patient is a pleasant 29-year-old female here today to discuss upper respiratory infection symptoms.  She tells me 2 weeks ago she went to [facility] and developed an upper respiratory infection.  States symptoms include nasal congestion, runny nose, productive cough with yellow/green phlegm, postnasal drip, bilateral ear fullness, decreased sense of taste and smell.  States she has been using over-the-counter Mucinex DM, Sudafed, Tylenol Cold, DayQuil, and NyQuil with no improvement of symptoms.  She tells me she is experiencing sleep deprivation due to cough symptoms.  Denies fever, chills, wheezing, shortness of breath, earache, tinnitus, sinus pressure, headache, dizziness, syncope, skin rash, diarrhea, nausea or vomiting.  Patient is inquiring what treatment options.    Patient Active Problem List    Diagnosis Date Noted   • Chronic midline low back pain with right-sided sciatica 11/01/2018   • Acute non-recurrent frontal sinusitis 10/23/2018   • Hashimoto's disease 09/13/2018   • Menorrhagia with irregular cycle 09/13/2018   • GERD (gastroesophageal reflux disease) 09/13/2018   • Recurrent UTI 09/13/2018   • Obesity (BMI 30-39.9) 09/13/2018       Allergies:Bactrim [sulfamethoxazole w-trimethoprim]; Ceftin [cefuroxime]; and Ciprofloxacin    Current Outpatient Prescriptions   Medication Sig Dispense Refill   • hydrOXYzine HCl (ATARAX) 50 MG Tab Take 50 mg by mouth 3 times a day as needed for Itching.     • benzonatate (TESSALON) 100 MG Cap Take 1 Cap by mouth 3 times a day as needed for Cough. 60 Cap 0   • azithromycin (ZITHROMAX) 250 MG Tab Take 2 tablets by mouth on day 1 and then take 1 tablet by mouth on days 2 through 5. 6 Tab 0   • fluticasone (FLONASE) 50 MCG/ACT  nasal spray Spray 1 Spray in nose every day. 16 g 6   • guaiFENesin LA (MUCINEX) 600 MG TABLET SR 12 HR Take 1 Tab by mouth every 12 hours. 28 Tab 0   • QUASENSE 0.15-0.03 MG per tablet Take 1 Tab by mouth every day. 84 Tab 3   • Meth-Hyo-M Bl-Na Phos-Ph Sal (URIBEL) 118 MG Cap Take  by mouth.     • thyroid (NP THYROID) 90 MG Tab Take 90 mg by mouth every day.     • omeprazole (PRILOSEC) 10 MG CAPSULE DELAYED RELEASE Take 10 mg by mouth every day.     • colestipol (COLESTID) 1 GM Tab Take 1 g by mouth 4 times a day.     • amitriptyline (ELAVIL) 10 MG Tab Take 10 mg by mouth every day.  1   • nitrofurantoin monohydr macro (MACROBID) 100 MG Cap Take 100 mg by mouth as needed.  0   • phenazopyridine (PYRIDIUM) 200 MG Tab Take 1 Tab by mouth 3 times a day as needed. 6 Tab 0     No current facility-administered medications for this visit.        Social History   Substance Use Topics   • Smoking status: Never Smoker   • Smokeless tobacco: Never Used   • Alcohol use No       Family Status   Relation Status   • MGFa (Not Specified)   • PGMo (Not Specified)   • PGFa (Not Specified)   • Neg Hx (Not Specified)     Family History   Problem Relation Age of Onset   • Diabetes Maternal Grandfather    • Diabetes Paternal Grandmother    • Diabetes Paternal Grandfather    • Cancer Neg Hx    • Heart Disease Neg Hx    • Stroke Neg Hx    • Alcohol/Drug Neg Hx        ROS:  Review of Systems   Constitutional: Negative for fever, chills, weight loss and malaise/fatigue.   HENT: Negative for ear pain, nosebleeds, sore throat and neck pain.  Positive for nasal congestion, rhinorrhea, postnasal drip, bilateral ear fullness.  Eyes: Negative for blurred vision.   Respiratory: Negative for shortness of breath and wheezing.  Positive for productive cough.  Cardiovascular: Negative for chest pain, palpitations, orthopnea and leg swelling.   Gastrointestinal: Negative for heartburn, nausea, vomiting and abdominal pain.   Genitourinary: Negative for  "dysuria, urgency and frequency.   Musculoskeletal: Negative for myalgias, back pain and joint pain.   Skin: Negative for rash and itching.   Neurological: Negative for dizziness, tingling, tremors, sensory change, focal weakness and headaches.   Endo/Heme/Allergies: Does not bruise/bleed easily.   Psychiatric/Behavioral: Negative for depression, suicidal ideas and memory loss.  The patient is not nervous/anxious and does not have insomnia.    All other systems reviewed and are negative except as in HPI.    Exam: /80 (BP Location: Left arm, Patient Position: Sitting, BP Cuff Size: Adult)   Pulse 88   Temp 36.3 °C (97.3 °F) (Temporal)   Resp 16   Ht 1.651 m (5' 5\")   Wt 110 kg (242 lb 9.6 oz)   SpO2 98%  Body mass index is 40.37 kg/m².  General: Normal appearing. No distress.  HEENT: Normocephalic. Eyes conjunctiva clear lids without ptosis, pupils equal and reactive to light accommodation, ears normal shape and contour, canals are clear bilaterally, tympanic membranes positive for mild erythema with clear effusions, nasal mucosa erythematous and boggy, oropharynx is without erythema, edema or exudates.  Positive for postnasal drip.  Maxillary and frontal sinuses are nontender to palpation.  Neck: Supple without JVD or bruit. Thyroid is not enlarged.  Pulmonary: Clear to ausculation.  Normal effort. No rales, ronchi, or wheezing.  Cardiovascular: Regular rate and rhythm without murmur.   Abdomen: Soft, nontender, nondistended.   Neurologic: Grossly nonfocal.  Cranial nerves are normal.  Lymph: No cervical, supraclavicular or axillary lymph nodes are palpable  Skin: Warm and dry.  No rashes or suspicious skin lesions.  Musculoskeletal: Normal gait. No extremity cyanosis, clubbing, or edema.  Psych: Normal mood and affect. Alert and oriented x3. Judgment and insight is normal.    Medical decision-making and discussion:  1. Upper respiratory tract infection, unspecified type    PLAN:  1. Educated patient that " on natural course of benign viral URI's.    Delayed antibiotic prescription if symptoms worsen over last 24-48 hours.  2. Twice daily use of nasal saline rinse or Neti-Pot encouraged.  Advised patient to use Flonase after rinsing nasal cavities.  Advised patient she can use Flonase up to twice a day.  3. Suggested Claritin, Zyrtec, Allegra.  4. OTC anti-pyretics and decongestants as needed.  5.  Patient has been prescribed benzonatate.  6.  Continue cough syrups.  Suggested Delsym, Robitussin, NyQuil or DayQuil.  6. Follow-up for worsening symptoms, difficulty breathing, lack of expected recovery, or should new symptoms or problems arise.    - benzonatate (TESSALON) 100 MG Cap; Take 1 Cap by mouth 3 times a day as needed for Cough.  Dispense: 60 Cap; Refill: 0  - azithromycin (ZITHROMAX) 250 MG Tab; Take 2 tablets by mouth on day 1 and then take 1 tablet by mouth on days 2 through 5.  Dispense: 6 Tab; Refill: 0  - fluticasone (FLONASE) 50 MCG/ACT nasal spray; Spray 1 Spray in nose every day.  Dispense: 16 g; Refill: 6      Please note that this dictation was created using voice recognition software. I have made every reasonable attempt to correct obvious errors, but I expect that there are errors of grammar and possibly content that I did not discover before finalizing the note.    Assessment/Plan:  1. Upper respiratory tract infection, unspecified type  benzonatate (TESSALON) 100 MG Cap    azithromycin (ZITHROMAX) 250 MG Tab    fluticasone (FLONASE) 50 MCG/ACT nasal spray       Return if symptoms worsen or fail to improve.

## 2019-10-31 ENCOUNTER — HOSPITAL ENCOUNTER (OUTPATIENT)
Dept: LAB | Facility: MEDICAL CENTER | Age: 29
End: 2019-10-31
Attending: PHYSICIAN ASSISTANT
Payer: COMMERCIAL

## 2019-10-31 ENCOUNTER — OFFICE VISIT (OUTPATIENT)
Dept: MEDICAL GROUP | Facility: PHYSICIAN GROUP | Age: 29
End: 2019-10-31
Payer: COMMERCIAL

## 2019-10-31 VITALS
DIASTOLIC BLOOD PRESSURE: 70 MMHG | TEMPERATURE: 97.1 F | HEART RATE: 77 BPM | BODY MASS INDEX: 39.49 KG/M2 | SYSTOLIC BLOOD PRESSURE: 116 MMHG | HEIGHT: 65 IN | WEIGHT: 237 LBS | RESPIRATION RATE: 18 BRPM | OXYGEN SATURATION: 98 %

## 2019-10-31 DIAGNOSIS — E28.2 PCOS (POLYCYSTIC OVARIAN SYNDROME): ICD-10-CM

## 2019-10-31 DIAGNOSIS — R30.0 DYSURIA: ICD-10-CM

## 2019-10-31 DIAGNOSIS — E06.3 HASHIMOTO'S DISEASE: ICD-10-CM

## 2019-10-31 DIAGNOSIS — N39.0 RECURRENT UTI: ICD-10-CM

## 2019-10-31 DIAGNOSIS — K52.9 CHRONIC DIARRHEA: ICD-10-CM

## 2019-10-31 DIAGNOSIS — K21.9 GASTROESOPHAGEAL REFLUX DISEASE, ESOPHAGITIS PRESENCE NOT SPECIFIED: ICD-10-CM
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
## 2019-10-31 DIAGNOSIS — Z23 NEED FOR VACCINATION: ICD-10-CM

## 2019-10-31 PROBLEM — J01.10 ACUTE NON-RECURRENT FRONTAL SINUSITIS: Status: RESOLVED | Noted: 2018-10-23 | Resolved: 2019-10-31

## 2019-10-31 LAB — TSH SERPL DL<=0.005 MIU/L-ACNC: 0.67 UIU/ML (ref 0.38–5.33)

## 2019-10-31 PROCEDURE — 36415 COLL VENOUS BLD VENIPUNCTURE: CPT

## 2019-10-31 PROCEDURE — 99214 OFFICE O/P EST MOD 30 MIN: CPT | Mod: 25 | Performed by: PHYSICIAN ASSISTANT

## 2019-10-31 PROCEDURE — 90715 TDAP VACCINE 7 YRS/> IM: CPT | Performed by: PHYSICIAN ASSISTANT

## 2019-10-31 PROCEDURE — 84443 ASSAY THYROID STIM HORMONE: CPT

## 2019-10-31 PROCEDURE — 90471 IMMUNIZATION ADMIN: CPT | Performed by: PHYSICIAN ASSISTANT

## 2019-10-31 RX ORDER — MONTELUKAST SODIUM 4 MG/1
TABLET, CHEWABLE ORAL
Qty: 120 TAB | Refills: 11 | Status: SHIPPED | OUTPATIENT
Start: 2019-10-31 | End: 2020-10-08 | Stop reason: SDUPTHER

## 2019-10-31 RX ORDER — OMEPRAZOLE 10 MG/1
10 CAPSULE, DELAYED RELEASE ORAL
Qty: 60 CAP | Refills: 11 | Status: SHIPPED | OUTPATIENT
Start: 2019-10-31 | End: 2020-10-08 | Stop reason: SDUPTHER

## 2019-10-31 RX ORDER — THYROID 90 MG/1
90 TABLET ORAL DAILY
Qty: 90 TAB | Refills: 3 | Status: SHIPPED | OUTPATIENT
Start: 2019-10-31 | End: 2020-01-24 | Stop reason: SDUPTHER

## 2019-10-31 ASSESSMENT — PATIENT HEALTH QUESTIONNAIRE - PHQ9: CLINICAL INTERPRETATION OF PHQ2 SCORE: 0
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
## 2020-01-24 DIAGNOSIS — E06.3 HASHIMOTO'S DISEASE: ICD-10-CM

## 2020-01-24 RX ORDER — THYROID 90 MG/1
90 TABLET ORAL DAILY
Qty: 90 TAB | Refills: 1 | Status: SHIPPED | OUTPATIENT
Start: 2020-01-24 | End: 2020-07-23

## 2020-03-12 ENCOUNTER — HOSPITAL ENCOUNTER (EMERGENCY)
Facility: MEDICAL CENTER | Age: 30
End: 2020-03-13
Attending: EMERGENCY MEDICINE
Payer: COMMERCIAL

## 2020-03-12 DIAGNOSIS — F41.9 ANXIETY: ICD-10-CM

## 2020-03-12 DIAGNOSIS — F12.920 CANNABIS INTOXICATION WITHOUT COMPLICATION (HCC): Primary | ICD-10-CM

## 2020-03-12 PROCEDURE — 99285 EMERGENCY DEPT VISIT HI MDM: CPT

## 2020-03-12 ASSESSMENT — FIBROSIS 4 INDEX: FIB4 SCORE: 0.36

## 2020-03-13 VITALS
TEMPERATURE: 98.4 F | SYSTOLIC BLOOD PRESSURE: 127 MMHG | DIASTOLIC BLOOD PRESSURE: 75 MMHG | WEIGHT: 200 LBS | RESPIRATION RATE: 18 BRPM | OXYGEN SATURATION: 100 % | HEIGHT: 65 IN | BODY MASS INDEX: 33.32 KG/M2 | HEART RATE: 95 BPM

## 2020-03-13 PROCEDURE — 700111 HCHG RX REV CODE 636 W/ 250 OVERRIDE (IP): Performed by: EMERGENCY MEDICINE

## 2020-03-13 PROCEDURE — 96375 TX/PRO/DX INJ NEW DRUG ADDON: CPT

## 2020-03-13 PROCEDURE — 96374 THER/PROPH/DIAG INJ IV PUSH: CPT

## 2020-03-13 RX ORDER — LORAZEPAM 2 MG/ML
1 INJECTION INTRAMUSCULAR ONCE
Status: COMPLETED | OUTPATIENT
Start: 2020-03-13 | End: 2020-03-13

## 2020-03-13 RX ORDER — ONDANSETRON 2 MG/ML
4 INJECTION INTRAMUSCULAR; INTRAVENOUS ONCE
Status: COMPLETED | OUTPATIENT
Start: 2020-03-13 | End: 2020-03-13

## 2020-03-13 RX ADMIN — ONDANSETRON 4 MG: 2 INJECTION INTRAMUSCULAR; INTRAVENOUS at 01:00

## 2020-03-13 RX ADMIN — LORAZEPAM 1 MG: 2 INJECTION INTRAMUSCULAR; INTRAVENOUS at 00:30

## 2020-03-13 RX ADMIN — LORAZEPAM 1 MG: 2 INJECTION INTRAMUSCULAR; INTRAVENOUS at 01:00

## 2020-03-13 ASSESSMENT — ENCOUNTER SYMPTOMS
TINGLING: 0
NAUSEA: 1
VOMITING: 1
NERVOUS/ANXIOUS: 1

## 2020-03-13 NOTE — ED TRIAGE NOTES
Pt c/o SOB, N/T/cramping in hands, lightheadedness, N/V, dry throat, after smoking hash this PM.  Pt BIB by EMS and obvious hyperventilating.  Pt coached by EMS en route without success.  Pt continued to  her respirations upon arrival with limited success.  Pt relates Hx of frequent THC use and having episodes of vomiting and anxiety in the past.

## 2020-03-13 NOTE — ED PROVIDER NOTES
ED Provider Note    Scribed for GIDEON Richmond II* by Zuleima Borden. 3/13/2020  12:07 AM    Means of Arrival: EMS  History obtained by: Patient  Limitations: None    CHIEF COMPLAINT  Chief Complaint   Patient presents with   • Anxiety   • N/V       HPI  Farideh Cunha is a 29 y.o. female who presents to the Emergency Department via EMS for nausea and vomiting onset this evening. She has associated numbness of her fingers, feeling anxious, and dry mouth. Patient states that she was smoking marijuana shortly before symptom onset. She denies any no chest pain. No alleviating or exacerbating factors were noted.     REVIEW OF SYSTEMS  Review of Systems   HENT:        Positive for dry mouth   Cardiovascular: Negative for chest pain.   Gastrointestinal: Positive for nausea and vomiting.   Neurological: Negative for tingling.        Positive for numbness of fingers   Psychiatric/Behavioral: The patient is nervous/anxious.      See HPI for further details.    PAST MEDICAL HISTORY   has a past medical history of GERD (gastroesophageal reflux disease), Thyroid disease, and Urinary tract infection.    SOCIAL HISTORY  Social History     Tobacco Use   • Smoking status: Never Smoker   • Smokeless tobacco: Never Used   Substance and Sexual Activity   • Alcohol use: No   • Drug use: Cannabis   • Sexual activity: Yes     Partners: Male     Birth control/protection: Condom     Comment: .        SURGICAL HISTORY   has a past surgical history that includes cholecystectomy.    CURRENT MEDICATIONS  Current Outpatient Medications:   •  thyroid (NP THYROID) 90 MG Tab, Take 1 Tab by mouth every day., Disp: 90 Tab, Rfl: 1  •  Meth-Hyo-M Bl-Na Phos-Ph Sal (URIBEL PO), Take  by mouth., Disp: , Rfl:   •  metFORMIN (GLUCOPHAGE) 500 MG Tab, Take 500 mg by mouth 2 times a day, with meals., Disp: , Rfl:   •  colestipol (COLESTID) 1 GM Tab, Take two tablets by mouth twice a day., Disp: 120 Tab, Rfl: 11  •  omeprazole (PRILOSEC) 10 MG  "CAPSULE DELAYED RELEASE, Take 1 Cap by mouth two times a day may change times on alt/days., Disp: 60 Cap, Rfl: 11  •  hydrOXYzine HCl (ATARAX) 50 MG Tab, Take 50 mg by mouth 3 times a day as needed for Itching., Disp: , Rfl:   •  fluticasone (FLONASE) 50 MCG/ACT nasal spray, Spray 1 Spray in nose every day., Disp: 16 g, Rfl: 6  •  amitriptyline (ELAVIL) 10 MG Tab, Take 10 mg by mouth every day., Disp: , Rfl: 1  •  nitrofurantoin monohydr macro (MACROBID) 100 MG Cap, Take 100 mg by mouth as needed., Disp: , Rfl: 0  •  Meth-Hyo-M Bl-Na Phos-Ph Sal (URIBEL) 118 MG Cap, Take  by mouth., Disp: , Rfl:       ALLERGIES  Allergies   Allergen Reactions   • Bactrim [Sulfamethoxazole W-Trimethoprim]    • Ceftin [Cefuroxime]      Throat swelling   • Ciprofloxacin        PHYSICAL EXAM  VITAL SIGNS: /93   Pulse (!) 107   Temp 36.9 °C (98.4 °F) (Temporal)   Resp (!) 26   Ht 1.651 m (5' 5\")   Wt 90.7 kg (200 lb)   LMP 02/20/2020   SpO2 100%   BMI 33.28 kg/m²     Pulse ox interpretation: I interpret this pulse ox as normal.  Constitutional: Anxious appear young woman. Alert.  Holding emesis bag.  HENT: No signs of trauma, Bilateral external ears normal, Nose normal.   Eyes: Eye redness bilaterally. Pupils are equal, Conjunctiva normal, Non-icteric.   Neck: Normal range of motion, No tenderness, Supple, No stridor.   Cardiovascular: Slightly elevated heart rate. Regular rhythm, no murmurs. Symmetric distal pulses. No cyanosis of extremities. No peripheral edema of extremities.  Thorax & Lungs: Normal breath sounds, No respiratory distress but intermittent hyperventilation, no wheezing, No chest tenderness.   Abdomen: Soft, No tenderness, No masses, No pulsatile masses. No peritoneal signs.  Skin: Warm, Dry, No erythema, No rash.   Back: No midline bony tenderness, No CVA tenderness.   Musculoskeletal: Good range of motion in all major joints. No tenderness to palpation or major deformities noted.   Neurologic: Alert and " oriented to person place time situation, 5-5 strength in all extremities.  No ataxia.  No slurred speech.  No aphasia.  Psychiatric: Anxious.      COURSE & MEDICAL DECISION MAKING  Pertinent Labs & Imaging studies reviewed. (See chart for details)    12:07 AM This is a 29 y.o. female who presents here with marijuana intoxication causing nausea, vomiting, dry mouth, and anxiety. Patient will be treated with ativan 1 mg for her symptoms.     12:40 AM - Patient experienced another episode of vomiting. She will be given zofran 4 mg and ativan 1 mg.     2:09 AM  No longer vomiting.  Resting comfortably.  Anticipate she will be able to be discharged when she is more alert from the Ativan.    2:50 AM  Tolerating oral intake, smiling. She says she is ready to go home. Significant other at bedside will provide safe transportation.     The patient will return for worsening symptoms and is stable at the time of discharge. The patient verbalizes understanding and will comply. Guidance provided on appropriate use of medications.    The patient is referred to a primary physician for blood pressure management, diabetic screening, and for all other preventative health concerns.      DISPOSITION:  Patient will be discharged home in stable condition.    FOLLOW UP:  [facility]  [address]  [facility] [address] [phone]    Call   to make an appointment as needed for general health care    [facility], Emergency Dept  [address] [facility] [address] [phone]    If symptoms worsen, trouble breathing, severe chest pain or other serious concerns      OUTPATIENT MEDICATIONS:  New Prescriptions    No medications on file         FINAL IMPRESSION  1. Cannabis intoxication without complication (HCC)    2. Anxiety          I, Zuleima Borden (Regan), am scribing for, and in the presence of, FRANCESCA Richmond II.    Electronically signed by: Zuleima Galvan), 3/13/2020    BRENDAN  GIDEON Richmond II* personally performed the services described in this documentation, as scribed by Zuleima Borden in my presence, and it is both accurate and complete.  E  The note accurately reflects work and decisions made by me.  Rafa Lewis II, M.D.  3/13/2020  2:50 AM

## 2020-04-22 ENCOUNTER — TELEMEDICINE (OUTPATIENT)
Dept: MEDICAL GROUP | Facility: PHYSICIAN GROUP | Age: 30
End: 2020-04-22
Payer: COMMERCIAL

## 2020-04-22 VITALS — WEIGHT: 204 LBS | TEMPERATURE: 97.6 F | HEIGHT: 65 IN | BODY MASS INDEX: 33.99 KG/M2

## 2020-04-22 DIAGNOSIS — E66.9 OBESITY (BMI 30-39.9): ICD-10-CM

## 2020-04-22 DIAGNOSIS — F41.9 ANXIETY: Primary | ICD-10-CM

## 2020-04-22 PROCEDURE — 99214 OFFICE O/P EST MOD 30 MIN: CPT | Mod: 95,CR | Performed by: FAMILY MEDICINE

## 2020-04-22 RX ORDER — LORAZEPAM 0.5 MG/1
0.5 TABLET ORAL EVERY 4 HOURS PRN
Qty: 10 TAB | Refills: 0 | Status: SHIPPED | OUTPATIENT
Start: 2020-04-22 | End: 2020-08-11 | Stop reason: SDUPTHER

## 2020-04-22 ASSESSMENT — FIBROSIS 4 INDEX: FIB4 SCORE: 0.36

## 2020-04-22 NOTE — PROGRESS NOTES
Telemedicine Visit: Established Patient     This encounter was conducted via [employer]   Verbal consent was obtained. Patient's identity was verified.    Subjective:   CC: panic attacks  Farideh Cunha is a 29 y.o. female presenting for evaluation and management of:    Anxiety  This is a new condition.  Onset: 1-2 months  Duration: up to 2 hours  Timing: mostly at night  Anxiety/panic attacks: body numbness, tongue feels swollen, feels like going to pass out, sob (main symptom), lightheaded, vision shakes, hearing decreases, nausea, racing heart rate (not main one)  Frequency: ~1/month  Home treatment: lavendar oil, deep breaths, phenergen (no help), hydroxyzine (takes every day at night)    She states she went to the ER for panic attacks, stopped with ativan. She has had anxiety before but worsened. Before going to the ER she had tried marijuana to treat it but no affect.    She reports she was prescribed hydroxyzine to use at night to help with UTI pain that lasted for a year. She reports it does nothing for her anxiety. She notes with her last panic attack she took the hydroxyzine & amitriptyline 30 min before the attack started. She doesn't see a therapist and hasn't seen in the past.     Obesity (BMI 30-39.9)  This is a chronic condition. She is doing weight watchers, decreased portion sizes, decreased fat and exercising regularly. She reports she has lost ~40 lbs since 1/2020.      ROS   Denies any recent fevers or chills. No chest pains.     Allergies   Allergen Reactions   • Bactrim [Sulfamethoxazole W-Trimethoprim]    • Ceftin [Cefuroxime]      Throat swelling   • Ciprofloxacin        Current medicines (including changes today)  Current Outpatient Medications   Medication Sig Dispense Refill   • LORazepam (ATIVAN) 0.5 MG Tab Take 1 Tab by mouth every four hours as needed for Anxiety for up to 30 days. 10 Tab 0   • thyroid (NP THYROID) 90 MG Tab Take 1 Tab by mouth every day. 90 Tab 1   • Meth-Hyo-M Bl-Na  "Phos-Ph Sal (URIBEL PO) Take  by mouth.     • colestipol (COLESTID) 1 GM Tab Take two tablets by mouth twice a day. 120 Tab 11   • omeprazole (PRILOSEC) 10 MG CAPSULE DELAYED RELEASE Take 1 Cap by mouth two times a day may change times on alt/days. 60 Cap 11   • hydrOXYzine HCl (ATARAX) 50 MG Tab Take 50 mg by mouth 3 times a day as needed for Itching.     • fluticasone (FLONASE) 50 MCG/ACT nasal spray Spray 1 Spray in nose every day. 16 g 6   • amitriptyline (ELAVIL) 10 MG Tab Take 10 mg by mouth every day.  1   • nitrofurantoin monohydr macro (MACROBID) 100 MG Cap Take 100 mg by mouth as needed.  0     No current facility-administered medications for this visit.        Patient Active Problem List    Diagnosis Date Noted   • Anxiety 04/22/2020   • Chronic midline low back pain with right-sided sciatica 11/01/2018   • Hashimoto's disease 09/13/2018   • Menorrhagia with irregular cycle 09/13/2018   • GERD (gastroesophageal reflux disease) 09/13/2018   • Obesity (BMI 30-39.9) 09/13/2018       Family History   Problem Relation Age of Onset   • Diabetes Maternal Grandfather    • Diabetes Paternal Grandmother    • Diabetes Paternal Grandfather    • No Known Problems Mother    • No Known Problems Father    • Cancer Neg Hx    • Heart Disease Neg Hx    • Stroke Neg Hx    • Alcohol/Drug Neg Hx        She  has a past medical history of GERD (gastroesophageal reflux disease), Thyroid disease, and Urinary tract infection.  She  has a past surgical history that includes cholecystectomy.       Objective:   Vitals obtained by patient:  Temp: 97.6, Height: 5' 5\" and Weight: 204 lbs    Physical Exam:  Constitutional: Alert, no distress, well-groomed.  Skin: No rashes in visible areas.  Eye: Round. Conjunctiva clear, lids normal. No icterus.   ENMT: Lips pink without lesions, good dentition, moist mucous membranes. Phonation normal.  Neck: No masses, no thyromegaly. Moves freely without pain.  Respiratory: Unlabored respiratory " effort, no cough or audible wheeze  Psych: Alert and oriented x3, normal affect and mood.     Assessment and Plan:   The following treatment plan was discussed:     1. Anxiety  This is a chronic condition, acutely exacerbated.  She reports that she has had anxiety for some time which is always been able to manage on her own without medication.  However, in the last 1-2 months she has had increased levels of anxiety and is started to develop panic attacks.  She gets the following symptoms: Body numbness, tongue feels swollen, presyncopal, shortness of breath, lightheadedness, shaking of the vision, decreased hearing, nausea, heart palpitations.  The most concerning symptom for her is the numbness and the trouble breathing.  They seem to occur about once a month.  She was seen in the ER in March, 2020 for panic attack.  She did treat with marijuana and that ER note attributed her symptoms to marijuana intoxication though she tells me she has had 2 more attacks since that ER visit with similar symptoms without any marijuana use.  She has amitriptyline and hydroxyzine prescribed to use nightly to help with pain secondary to a UTI.  She reports that the hydroxyzine does nothing for the panic attacks.  She does not feel like the cardiac symptoms are the main issue so she is not interested in trying propranolol.  She was given Ativan in the ER which helped and she would like more of that today.  We did discuss the risks and benefits of regular Ativan use.  She expressed understanding and states that she would only use it as needed for the panic attacks and hopefully that will not be more than once per month.  We verbally went over the controlled substance treatment agreement.  There is no drug screen on file but will hold off on a drug screen today as I am hopeful that this is situational anxiety and she will not require a refill.Obtained and reviewed patient utilization report from [facility] [place] pharmacy database on  4/22/2020 1:24 PM  prior to writing prescription for controlled substance II, III or IV per [place] bill . Based on assessment of the report, the prescription is medically necessary.   - LORazepam (ATIVAN) 0.5 MG Tab; Take 1 Tab by mouth every four hours as needed for Anxiety for up to 30 days.  Dispense: 10 Tab; Refill: 0  -We did discuss that if she finds that the anxiety attacks are occurring more frequently or if her overall levels of anxiety are increasing she should talk with her PCP about a possible daily medication.  However, this is slightly complicated by the fact that she is already on amitriptyline nightly.    2. Obesity (BMI 30-39.9)  This is a chronic condition, improved.  She states she is lost approximately 40 pounds since January, 2020.  She is done that by doing a weight watchers diet, diet very low in fat, significant decrease in portion sizes, and exercising regularly.  I congratulated her on her progress.      Follow-up: Return if symptoms worsen or fail to improve.

## 2020-04-22 NOTE — ASSESSMENT & PLAN NOTE
This is a chronic condition. She is doing weight watchers, decreased portion sizes, decreased fat and exercising regularly. She reports she has lost ~40 lbs since 1/2020.

## 2020-04-22 NOTE — ASSESSMENT & PLAN NOTE
This is a new condition.  Onset: 1-2 months  Duration: up to 2 hours  Timing: mostly at night  Anxiety/panic attacks: body numbness, tongue feels swollen, feels like going to pass out, sob (main symptom), lightheaded, vision shakes, hearing decreases, nausea, racing heart rate (not main one)  Frequency: ~1/month  Home treatment: lavendar oil, deep breaths, phenergen (no help), hydroxyzine (takes every day at night)    She states she went to the ER for panic attacks, stopped with ativan. She has had anxiety before but worsened. Before going to the ER she had tried marijuana to treat it but no affect.    She reports she was prescribed hydroxyzine to use at night to help with UTI pain that lasted for a year. She reports it does nothing for her anxiety. She notes with her last panic attack she took the hydroxyzine & amitriptyline 30 min before the attack started. She doesn't see a therapist and hasn't seen in the past.

## 2020-07-23 DIAGNOSIS — E06.3 HASHIMOTO'S DISEASE: ICD-10-CM

## 2020-07-23 RX ORDER — LEVOTHYROXINE, LIOTHYRONINE 57; 13.5 UG/1; UG/1
TABLET ORAL
Qty: 90 TAB | Refills: 0 | Status: SHIPPED | OUTPATIENT
Start: 2020-07-23 | End: 2020-10-08 | Stop reason: SDUPTHER

## 2020-08-11 ENCOUNTER — TELEMEDICINE (OUTPATIENT)
Dept: MEDICAL GROUP | Facility: PHYSICIAN GROUP | Age: 30
End: 2020-08-11
Payer: COMMERCIAL

## 2020-08-11 VITALS — BODY MASS INDEX: 29.82 KG/M2 | WEIGHT: 179 LBS | RESPIRATION RATE: 18 BRPM | HEIGHT: 65 IN

## 2020-08-11 DIAGNOSIS — F41.9 ANXIETY: ICD-10-CM

## 2020-08-11 DIAGNOSIS — F41.0 PANIC ATTACKS: ICD-10-CM

## 2020-08-11 PROCEDURE — 99214 OFFICE O/P EST MOD 30 MIN: CPT | Mod: 95,CR | Performed by: PHYSICIAN ASSISTANT

## 2020-08-11 RX ORDER — METHENAMINE, SODIUM PHOSPHATE, MONOBASIC, MONOHYDRATE, PHENYL SALICYLATE, METHYLENE BLUE, AND HYOSCYAMINE SULFATE 118; 40.8; 36; 10; .12 MG/1; MG/1; MG/1; MG/1; MG/1
1 CAPSULE ORAL
COMMUNITY
Start: 2020-07-29 | End: 2022-06-17

## 2020-08-11 RX ORDER — LORAZEPAM 0.5 MG/1
0.5 TABLET ORAL EVERY 4 HOURS PRN
Qty: 10 TAB | Refills: 0 | Status: SHIPPED | OUTPATIENT
Start: 2020-08-11 | End: 2020-10-08 | Stop reason: SDUPTHER

## 2020-08-11 ASSESSMENT — FIBROSIS 4 INDEX: FIB4 SCORE: 0.37

## 2020-08-11 ASSESSMENT — PATIENT HEALTH QUESTIONNAIRE - PHQ9: CLINICAL INTERPRETATION OF PHQ2 SCORE: 0

## 2020-08-11 NOTE — PROGRESS NOTES
Telemedicine Visit: Established Patient     This evaluation was conducted via Zoom, using secure and encrypted videoconferencing technology.  The patient was physical located at Home in [place], [place] and the physician was located in [facility].  The patient was presented by self, at home.  The patient's identity was confirmed and verbal consent for the telemedicine encounter was obtained.      Subjective:   CC: Panic attacks  Farideh Cunha is a 30 y.o. female presenting for evaluation and management of:    Patient is a pleasant 30-year-old female here today to follow-up on anxiety with panic attacks.  Patient was recently seen by a colleague of will, Dr. Beltran on 4/22/2020 and patient was prescribed 10 Lorazepam 0.5 mg tab once daily as needed.  She tells me she still has 1 tablet left.  States knowing that she has the medication helps ease her mind if she is to experience a panic attack.  Patient states in June of last year she was prescribed amitriptyline and hydroxyzine for recurrent UTIs and interstitial cystitis symptoms.  States she was not diagnosed with interstitial cystitis.  She has new medications and are managed by her urologist.  States since starting amitriptyline and hydroxyzine she did has developed anxiety and most recently developed panic attacks.  States on average she experienced 1 pain attack a month.  States she feels like she is going to faint and has feelings of impending doom.  States he can take several hours for her to calm down.  If she takes lorazepam symptoms resolve quickly.  She denies misuse or abuse of medication.  Denies alcohol use or illicit drug use.  She tells me that she has been slowly weaning herself off of amitriptyline and hydroxyzine.    ROS   Denies any recent fevers or chills. No nausea or vomiting. No chest pains or shortness of breath.     Allergies   Allergen Reactions   • Bactrim [Sulfamethoxazole W-Trimethoprim]    • Ceftin [Cefuroxime]      Throat swelling   •  "Ciprofloxacin        Current medicines (including changes today)  Current Outpatient Medications   Medication Sig Dispense Refill   • Meth-Hyo-M Bl-Na Phos-Ph Sal (URIBEL) 118 MG Cap Take 1 Capsule by mouth. Take 1 capsule by mouth two times a day     • LORazepam (ATIVAN) 0.5 MG Tab Take 1 Tab by mouth every four hours as needed for Anxiety for up to 90 days. 10 Tab 0   • NP THYROID 90 MG Tab TAKE 1 TABLET BY MOUTH EVERY DAY 90 Tab 0   • colestipol (COLESTID) 1 GM Tab Take two tablets by mouth twice a day. 120 Tab 11   • omeprazole (PRILOSEC) 10 MG CAPSULE DELAYED RELEASE Take 1 Cap by mouth two times a day may change times on alt/days. 60 Cap 11   • hydrOXYzine HCl (ATARAX) 50 MG Tab Take 50 mg by mouth 3 times a day as needed for Itching.     • fluticasone (FLONASE) 50 MCG/ACT nasal spray Spray 1 Spray in nose every day. 16 g 6   • amitriptyline (ELAVIL) 10 MG Tab Take 10 mg by mouth every day.  1     No current facility-administered medications for this visit.        Patient Active Problem List    Diagnosis Date Noted   • Anxiety 04/22/2020   • Chronic midline low back pain with right-sided sciatica 11/01/2018   • Hashimoto's disease 09/13/2018   • Menorrhagia with irregular cycle 09/13/2018   • GERD (gastroesophageal reflux disease) 09/13/2018   • Obesity (BMI 30-39.9) 09/13/2018       Family History   Problem Relation Age of Onset   • Diabetes Maternal Grandfather    • Diabetes Paternal Grandmother    • Diabetes Paternal Grandfather    • No Known Problems Mother    • No Known Problems Father    • Cancer Neg Hx    • Heart Disease Neg Hx    • Stroke Neg Hx    • Alcohol/Drug Neg Hx        She  has a past medical history of GERD (gastroesophageal reflux disease), Thyroid disease, and Urinary tract infection.  She  has a past surgical history that includes cholecystectomy.       Objective:   Resp 18   Ht 1.651 m (5' 5\")   Wt 81.2 kg (179 lb) Comment: pt reported  BMI 29.79 kg/m²     Physical " Exam:  Constitutional: Alert, no distress, well-groomed.  Skin: No rashes in visible areas.  Eye: Round. Conjunctiva clear, lids normal. No icterus.   ENMT: Lips pink without lesions, good dentition, moist mucous membranes. Phonation normal.  Neck: No masses, no thyromegaly. Moves freely without pain.  CV: Pulse as reported by patient  Respiratory: Unlabored respiratory effort, no cough or audible wheeze  Psych: Alert and oriented x3, normal affect and mood.       Assessment and Plan:   The following treatment plan was discussed:     1. Anxiety  2. Panic attacks  El Reviewed: No red flags  Refilled Ativan for patient.  Discussed with patient Ativan is for acute use only.  Highly emphasized importance of following up with urology and discussing current symptoms and how she is concerned that it may be related to amitriptyline and hydroxyzine.  Advised patient discuss other treatment options for interstitial cystitis symptoms.  Patient agreed to plan.  Advised patient to avoid alcohol use or illicit drug use.  Advised patient in the future if she needs a refill for Ativan to make a in office appointment.  Patient will need to be drug screened at that time.  If patient feels that she is getting needed Ativan for long-term use a referral to psychiatry was placed.  Patient agreed to plan.  Continue developing healthy coping mechanisms for stress and anxiety.  Continue positive talk.    - LORazepam (ATIVAN) 0.5 MG Tab; Take 1 Tab by mouth every four hours as needed for Anxiety for up to 90 days.  Dispense: 10 Tab; Refill: 0    Other orders  - Meth-Hyo-M Bl-Na Phos-Ph Sal (URIBEL) 118 MG Cap; Take 1 Capsule by mouth. Take 1 capsule by mouth two times a day        Follow-up: Return if symptoms worsen or fail to improve.

## 2020-10-08 ENCOUNTER — OFFICE VISIT (OUTPATIENT)
Dept: MEDICAL GROUP | Facility: PHYSICIAN GROUP | Age: 30
End: 2020-10-08
Payer: COMMERCIAL

## 2020-10-08 ENCOUNTER — HOSPITAL ENCOUNTER (OUTPATIENT)
Dept: LAB | Facility: MEDICAL CENTER | Age: 30
End: 2020-10-08
Attending: PHYSICIAN ASSISTANT
Payer: COMMERCIAL

## 2020-10-08 ENCOUNTER — HOSPITAL ENCOUNTER (OUTPATIENT)
Facility: MEDICAL CENTER | Age: 30
End: 2020-10-08
Attending: PHYSICIAN ASSISTANT
Payer: COMMERCIAL

## 2020-10-08 VITALS
HEART RATE: 98 BPM | SYSTOLIC BLOOD PRESSURE: 112 MMHG | TEMPERATURE: 98.2 F | HEIGHT: 65 IN | RESPIRATION RATE: 16 BRPM | WEIGHT: 164.2 LBS | DIASTOLIC BLOOD PRESSURE: 64 MMHG | OXYGEN SATURATION: 97 % | BODY MASS INDEX: 27.36 KG/M2

## 2020-10-08 DIAGNOSIS — E06.3 HASHIMOTO'S DISEASE: ICD-10-CM

## 2020-10-08 DIAGNOSIS — K21.9 GASTROESOPHAGEAL REFLUX DISEASE WITHOUT ESOPHAGITIS: ICD-10-CM

## 2020-10-08 DIAGNOSIS — F41.9 ANXIETY: ICD-10-CM

## 2020-10-08 DIAGNOSIS — Z20.2 POSSIBLE EXPOSURE TO STD: ICD-10-CM

## 2020-10-08 DIAGNOSIS — F41.0 GENERALIZED ANXIETY DISORDER WITH PANIC ATTACKS: ICD-10-CM

## 2020-10-08 DIAGNOSIS — F41.0 PANIC ATTACKS: ICD-10-CM

## 2020-10-08 DIAGNOSIS — K52.9 CHRONIC DIARRHEA: ICD-10-CM

## 2020-10-08 DIAGNOSIS — F33.2 SEVERE EPISODE OF RECURRENT MAJOR DEPRESSIVE DISORDER, WITHOUT PSYCHOTIC FEATURES (HCC): ICD-10-CM

## 2020-10-08 DIAGNOSIS — F41.1 GENERALIZED ANXIETY DISORDER WITH PANIC ATTACKS: ICD-10-CM

## 2020-10-08 LAB
BASOPHILS # BLD AUTO: 0.5 % (ref 0–1.8)
BASOPHILS # BLD: 0.03 K/UL (ref 0–0.12)
CANDIDA DNA VAG QL PROBE+SIG AMP: NEGATIVE
EOSINOPHIL # BLD AUTO: 0.06 K/UL (ref 0–0.51)
EOSINOPHIL NFR BLD: 1.1 % (ref 0–6.9)
ERYTHROCYTE [DISTWIDTH] IN BLOOD BY AUTOMATED COUNT: 45.6 FL (ref 35.9–50)
G VAGINALIS DNA VAG QL PROBE+SIG AMP: NEGATIVE
HBV SURFACE AG SER QL: NORMAL
HCT VFR BLD AUTO: 41.6 % (ref 37–47)
HCV AB SER QL: NORMAL
HGB BLD-MCNC: 14.1 G/DL (ref 12–16)
HIV 1+2 AB+HIV1 P24 AG SERPL QL IA: NORMAL
IMM GRANULOCYTES # BLD AUTO: 0.01 K/UL (ref 0–0.11)
IMM GRANULOCYTES NFR BLD AUTO: 0.2 % (ref 0–0.9)
LYMPHOCYTES # BLD AUTO: 1.89 K/UL (ref 1–4.8)
LYMPHOCYTES NFR BLD: 33.3 % (ref 22–41)
MCH RBC QN AUTO: 29.6 PG (ref 27–33)
MCHC RBC AUTO-ENTMCNC: 33.9 G/DL (ref 33.6–35)
MCV RBC AUTO: 87.4 FL (ref 81.4–97.8)
MONOCYTES # BLD AUTO: 0.33 K/UL (ref 0–0.85)
MONOCYTES NFR BLD AUTO: 5.8 % (ref 0–13.4)
NEUTROPHILS # BLD AUTO: 3.35 K/UL (ref 2–7.15)
NEUTROPHILS NFR BLD: 59.1 % (ref 44–72)
NRBC # BLD AUTO: 0 K/UL
NRBC BLD-RTO: 0 /100 WBC
PLATELET # BLD AUTO: 245 K/UL (ref 164–446)
PMV BLD AUTO: 9.8 FL (ref 9–12.9)
RBC # BLD AUTO: 4.76 M/UL (ref 4.2–5.4)
T VAGINALIS DNA VAG QL PROBE+SIG AMP: NEGATIVE
TSH SERPL DL<=0.005 MIU/L-ACNC: 1.41 UIU/ML (ref 0.38–5.33)
WBC # BLD AUTO: 5.7 K/UL (ref 4.8–10.8)

## 2020-10-08 PROCEDURE — 87340 HEPATITIS B SURFACE AG IA: CPT

## 2020-10-08 PROCEDURE — 87480 CANDIDA DNA DIR PROBE: CPT

## 2020-10-08 PROCEDURE — 86803 HEPATITIS C AB TEST: CPT

## 2020-10-08 PROCEDURE — 87660 TRICHOMONAS VAGIN DIR PROBE: CPT

## 2020-10-08 PROCEDURE — 86780 TREPONEMA PALLIDUM: CPT

## 2020-10-08 PROCEDURE — 87491 CHLMYD TRACH DNA AMP PROBE: CPT

## 2020-10-08 PROCEDURE — 84443 ASSAY THYROID STIM HORMONE: CPT

## 2020-10-08 PROCEDURE — 87591 N.GONORRHOEAE DNA AMP PROB: CPT

## 2020-10-08 PROCEDURE — 85025 COMPLETE CBC W/AUTO DIFF WBC: CPT

## 2020-10-08 PROCEDURE — 99214 OFFICE O/P EST MOD 30 MIN: CPT | Performed by: PHYSICIAN ASSISTANT

## 2020-10-08 PROCEDURE — 36415 COLL VENOUS BLD VENIPUNCTURE: CPT

## 2020-10-08 PROCEDURE — 87389 HIV-1 AG W/HIV-1&-2 AB AG IA: CPT

## 2020-10-08 PROCEDURE — 87510 GARDNER VAG DNA DIR PROBE: CPT

## 2020-10-08 RX ORDER — LORAZEPAM 0.5 MG/1
0.5 TABLET ORAL EVERY 4 HOURS PRN
Qty: 10 TAB | Refills: 0 | Status: SHIPPED | OUTPATIENT
Start: 2020-10-08 | End: 2020-10-22

## 2020-10-08 RX ORDER — MONTELUKAST SODIUM 4 MG/1
TABLET, CHEWABLE ORAL
Qty: 120 TAB | Refills: 11 | Status: SHIPPED | OUTPATIENT
Start: 2020-10-08 | End: 2022-02-11

## 2020-10-08 RX ORDER — NITROFURANTOIN 25; 75 MG/1; MG/1
CAPSULE ORAL
COMMUNITY
Start: 2020-10-04 | End: 2021-11-08

## 2020-10-08 RX ORDER — OMEPRAZOLE 10 MG/1
10 CAPSULE, DELAYED RELEASE ORAL
Qty: 180 CAP | Refills: 3 | Status: SHIPPED | OUTPATIENT
Start: 2020-10-08 | End: 2021-09-03 | Stop reason: SDUPTHER

## 2020-10-08 RX ORDER — ESTRADIOL 0.1 MG/G
CREAM VAGINAL
COMMUNITY
Start: 2020-07-29 | End: 2021-09-03 | Stop reason: SDUPTHER

## 2020-10-08 RX ORDER — HYDROXYZINE PAMOATE 50 MG/1
CAPSULE ORAL
COMMUNITY
Start: 2020-07-13 | End: 2020-10-08

## 2020-10-08 RX ORDER — THYROID 90 MG/1
90 TABLET ORAL
Qty: 90 TAB | Refills: 3 | Status: SHIPPED | OUTPATIENT
Start: 2020-10-08 | End: 2021-04-26

## 2020-10-08 ASSESSMENT — FIBROSIS 4 INDEX: FIB4 SCORE: 0.37

## 2020-10-08 ASSESSMENT — PATIENT HEALTH QUESTIONNAIRE - PHQ9
CLINICAL INTERPRETATION OF PHQ2 SCORE: 6
5. POOR APPETITE OR OVEREATING: 3 - NEARLY EVERY DAY
SUM OF ALL RESPONSES TO PHQ QUESTIONS 1-9: 24

## 2020-10-08 NOTE — PROGRESS NOTES
Chief Complaint   Patient presents with   • Anxiety     Going through Divorice, Panic attacks are at a 10   • Medication Refill     All, Thyroid as 90 quantity       HISTORY OF PRESENT ILLNESS: Farideh Cunha is an established 30 y.o. female here to discuss the evaluation and management of:    Hashimoto's disease  Chronic problem.  Unknown stable.  Patient is past due for lab work results.  She tells me that she is been taking Mascot Thyroid 90 mg tablet once daily.  Denies side effects or complications from medication.    Severe episode of recurrent major depressive disorder, without psychotic features (HCC)  Generalized anxiety disorder with panic attacks  Anxiety  Panic attacks  Patient is tearful during today's appointment.  She tells me 7 days ago she found out her  has been cheating on her.  States she is been  her  for 10 years and has been with him for total of 15 years.  States she has not told him that she knows about his infidelity.  She tells me that he recently started working in IT at the [employer] and has been having an affair with a pharmacy resident.  States he has not been home for the past 1 week.  She tells me that she filed for divorce 2 days ago.  States she is having frequent panic attacks and has been crying a lot.  She tells me that she is depressed and would like to see a therapist.  She denies homicidal or suicidal ideation.  She tells me that she is having a hard time trying to process what is happening.  Patient is concerned about STDs.  She tells me that she is asymptomatic.  She would like STD lab work ordered.      Depression Screening    Little interest or pleasure in doing things?  3 - nearly every day   Feeling down, depressed , or hopeless? 3 - nearly every day   Trouble falling or staying asleep, or sleeping too much?  3 - nearly every day   Feeling tired or having little energy?  3 - nearly every day   Poor appetite or overeating?  3 - nearly every day    Feeling bad about yourself - or that you are a failure or have let yourself or your family down? 3 - nearly every day   Trouble concentrating on things, such as reading the newspaper or watching television? 3 - nearly every day   Moving or speaking so slowly that other people could have noticed.  Or the opposite - being so fidgety or restless that you have been moving around a lot more than usual?  3 - nearly every day   Thoughts that you would be better off dead, or of hurting yourself?  0 - not at all   Patient Health Questionnaire Score: 24       If depressive symptoms identified deferred to follow up visit unless specifically addressed in assesment and plan.    Interpretation of PHQ-9 Total Score   Score Severity   1-4 No Depression   5-9 Mild Depression   10-14 Moderate Depression   15-19 Moderately Severe Depression   20-27 Severe Depression        Gastroesophageal reflux disease without esophagitis  Chronic but stable problem.  Managed with Prilosec.  Side effects or complications from Prilosec.  Denies cough, abdominal pain, nausea, vomiting.      Chronic diarrhea  Chronic but stable problem.  Symptoms are managed with colestipol.  Patient is requesting refill.  Denies side effects or complications medication.            Patient Active Problem List    Diagnosis Date Noted   • Anxiety 04/22/2020   • Chronic midline low back pain with right-sided sciatica 11/01/2018   • Hashimoto's disease 09/13/2018   • Menorrhagia with irregular cycle 09/13/2018   • GERD (gastroesophageal reflux disease) 09/13/2018   • Obesity (BMI 30-39.9) 09/13/2018       Allergies:Bactrim [sulfamethoxazole w-trimethoprim], Ceftin [cefuroxime], and Ciprofloxacin    Current Outpatient Medications   Medication Sig Dispense Refill   • estradiol (ESTRACE) 0.1 MG/GM vaginal cream VIC PEA SIZED AMT VAGINALLY HS     • nitrofurantoin (MACROBID) 100 MG Cap      • LORazepam (ATIVAN) 0.5 MG Tab Take 1 Tab by mouth every four hours as needed for  Anxiety for up to 14 days. 10 Tab 0   • omeprazole (PRILOSEC) 10 MG CAPSULE DELAYED RELEASE Take 1 Cap by mouth two times a day may change times on alt/days. 180 Cap 3   • colestipol (COLESTID) 1 GM Tab Take two tablets by mouth twice a day. 120 Tab 11   • thyroid (NP THYROID) 90 MG Tab Take 1 Tab by mouth every day. 90 Tab 3   • Meth-Hyo-M Bl-Na Phos-Ph Sal (URIBEL) 118 MG Cap Take 1 Capsule by mouth. Take 1 capsule by mouth two times a day     • hydrOXYzine HCl (ATARAX) 50 MG Tab Take 50 mg by mouth 3 times a day as needed for Itching.     • fluticasone (FLONASE) 50 MCG/ACT nasal spray Spray 1 Spray in nose every day. 16 g 6   • amitriptyline (ELAVIL) 10 MG Tab Take 10 mg by mouth every day.  1     No current facility-administered medications for this visit.        Social History     Tobacco Use   • Smoking status: Never Smoker   • Smokeless tobacco: Never Used   Substance Use Topics   • Alcohol use: No   • Drug use: No       Family Status   Relation Name Status   • MGFa  (Not Specified)   • PGMo  (Not Specified)   • PGFa  (Not Specified)   • Mo  Alive   • Fa  Alive   • Neg Hx  (Not Specified)     Family History   Problem Relation Age of Onset   • Diabetes Maternal Grandfather    • Diabetes Paternal Grandmother    • Diabetes Paternal Grandfather    • No Known Problems Mother    • No Known Problems Father    • Cancer Neg Hx    • Heart Disease Neg Hx    • Stroke Neg Hx    • Alcohol/Drug Neg Hx        ROS:  Review of Systems   Constitutional: Negative for fever, chills, weight loss and malaise/fatigue.   HENT: Negative for ear pain, nosebleeds, congestion, sore throat and neck pain.    Eyes: Negative for blurred vision.   Respiratory: Negative for cough, sputum production, shortness of breath and wheezing.    Cardiovascular: Negative for chest pain, palpitations, orthopnea and leg swelling.   Gastrointestinal: Negative for heartburn, nausea, vomiting and abdominal pain.   Genitourinary: Negative for dysuria, urgency  "and frequency.   Musculoskeletal: Negative for myalgias, back pain and joint pain.   Skin: Negative for rash and itching.   Neurological: Negative for dizziness, tingling, tremors, sensory change, focal weakness and headaches.   Endo/Heme/Allergies: Does not bruise/bleed easily.   Psychiatric/Behavioral: Negative for suicidal ideas and memory loss.  + for depression anxiety.  All other systems reviewed and are negative except as in HPI.    Exam: /64 (BP Location: Left arm, Patient Position: Sitting, BP Cuff Size: Adult)   Pulse 98   Temp 36.8 °C (98.2 °F) (Temporal)   Resp 16   Ht 1.651 m (5' 5\")   Wt 74.5 kg (164 lb 3.2 oz)   SpO2 97%  Body mass index is 27.32 kg/m².  General: Normal appearing. No distress.  HEENT: Normocephalic. Eyes conjunctiva clear lids without ptosis, ears normal shape and contour.  Neck: Supple without JVD. Thyroid is not enlarged.  Pulmonary: Clear to ausculation.  Normal effort. No rales, ronchi, or wheezing.  Cardiovascular: Regular rate and rhythm without murmur.   Abdomen: Nondistended.   Neurologic: Grossly nonfocal.  Cranial nerves are normal.   Skin: Warm and dry.  No rashes or suspicious skin lesions.  Musculoskeletal: Normal gait. No extremity cyanosis, clubbing, or edema.  Psych: Normal mood and affect. Alert and oriented x3. Judgment and insight is normal.    Medical decision-making and discussion:  1. Hashimoto's disease  Continue thyroid medication. Instructed patient to take on empty stomach with glass of water, 30 minutes prior to food or other medications. Labs as indicated.    - TSH WITH REFLEX TO FT4; Future  - thyroid (NP THYROID) 90 MG Tab; Take 1 Tab by mouth every day.  Dispense: 90 Tab; Refill: 3    2. Severe episode of recurrent major depressive disorder, without psychotic features (HCC)  3. Generalized anxiety disorder with panic attacks  4. Anxiety  5. Panic attacks  Patient has suffered from recurrent major depressive disorder due to chronic medical " conditions.  She is having an acute exacerbation of depression and is also experiencing anxiety and panic attacks.  Patient recently found out that her  of 10 years is having an affair.  Patient would like to see a therapist.  Referral has been placed.  Patient has been prescribed Lorazepam for acute panic attacks and discussed this is for acute use only.  Patient did not drink alcohol, combine other stated medications or operate heavy general taken prescribed on occasion.  Patient agreed to plan. Advised patient continue work on diet, exercise, sleep hygiene, hydration, and developing healthy coping mechanisms for stress anxiety.  She denies homicidal or suicidal ideation.  Discussed that should the patient have any symptoms they should call suicide prevention hotline or report to the emergency room immediately.    She will follow-up in 1 week for reevaluation.    - REFERRAL TO PSYCHOLOGY  - LORazepam (ATIVAN) 0.5 MG Tab; Take 1 Tab by mouth every four hours as needed for Anxiety for up to 14 days.  Dispense: 10 Tab; Refill: 0    6. Gastroesophageal reflux disease without esophagitis  This is a chronic and stable problem. Patient is doing well. No red flags. Continue PPI and monitor.    - omeprazole (PRILOSEC) 10 MG CAPSULE DELAYED RELEASE; Take 1 Cap by mouth two times a day may change times on alt/days.  Dispense: 180 Cap; Refill: 3  - CBC WITH DIFFERENTIAL; Future    7. Chronic diarrhea  Chronic but stable problem.  Continue colestipol.  Continue monitor.    - colestipol (COLESTID) 1 GM Tab; Take two tablets by mouth twice a day.  Dispense: 120 Tab; Refill: 11    8. Possible exposure to STD  Lab work has been ordered.  Patient follow-up in 1 week to discuss lab work results.    - Chlamydia/GC PCR Urine Or Swab; Future  - HIV AG/AB COMBO ASSAY SCREENING; Future  - RPR (SYPHILIS); Future  - HEP B SURFACE ANTIGEN; Future  - HEP C VIRUS ANTIBODY; Future  - VAGINAL PATHOGENS DNA PANEL; Future      Please note  that this dictation was created using voice recognition software. I have made every reasonable attempt to correct obvious errors, but I expect that there are errors of grammar and possibly content that I did not discover before finalizing the note.    Assessment/Plan:  1. Hashimoto's disease  TSH WITH REFLEX TO FT4    thyroid (NP THYROID) 90 MG Tab   2. Severe episode of recurrent major depressive disorder, without psychotic features (HCC)  REFERRAL TO PSYCHOLOGY   3. Generalized anxiety disorder with panic attacks  REFERRAL TO PSYCHOLOGY   4. Anxiety  LORazepam (ATIVAN) 0.5 MG Tab   5. Panic attacks  LORazepam (ATIVAN) 0.5 MG Tab   6. Gastroesophageal reflux disease without esophagitis  omeprazole (PRILOSEC) 10 MG CAPSULE DELAYED RELEASE    CBC WITH DIFFERENTIAL   7. Chronic diarrhea  colestipol (COLESTID) 1 GM Tab   8. Possible exposure to STD  Chlamydia/GC PCR Urine Or Swab    HIV AG/AB COMBO ASSAY SCREENING    RPR (SYPHILIS)    HEP B SURFACE ANTIGEN    HEP C VIRUS ANTIBODY    VAGINAL PATHOGENS DNA PANEL       Return in about 1 week (around 10/15/2020).

## 2020-10-12 LAB — TREPONEMA PALLIDUM IGG+IGM AB [PRESENCE] IN SERUM OR PLASMA BY IMMUNOASSAY: NORMAL

## 2020-10-23 ENCOUNTER — OFFICE VISIT (OUTPATIENT)
Dept: MEDICAL GROUP | Facility: PHYSICIAN GROUP | Age: 30
End: 2020-10-23
Payer: COMMERCIAL

## 2020-10-23 VITALS
TEMPERATURE: 97.5 F | HEART RATE: 88 BPM | DIASTOLIC BLOOD PRESSURE: 70 MMHG | HEIGHT: 65 IN | RESPIRATION RATE: 16 BRPM | SYSTOLIC BLOOD PRESSURE: 100 MMHG | BODY MASS INDEX: 27.29 KG/M2 | WEIGHT: 163.8 LBS | OXYGEN SATURATION: 90 %

## 2020-10-23 DIAGNOSIS — Z30.011 ENCOUNTER FOR INITIAL PRESCRIPTION OF CONTRACEPTIVE PILLS: ICD-10-CM

## 2020-10-23 DIAGNOSIS — F41.0 PANIC ATTACKS: ICD-10-CM

## 2020-10-23 DIAGNOSIS — F33.2 SEVERE EPISODE OF RECURRENT MAJOR DEPRESSIVE DISORDER, WITHOUT PSYCHOTIC FEATURES (HCC): ICD-10-CM

## 2020-10-23 DIAGNOSIS — F41.9 ANXIETY: ICD-10-CM

## 2020-10-23 PROCEDURE — 99214 OFFICE O/P EST MOD 30 MIN: CPT | Performed by: PHYSICIAN ASSISTANT

## 2020-10-23 RX ORDER — DROSPIRENONE AND ETHINYL ESTRADIOL 0.03MG-3MG
1 KIT ORAL DAILY
Qty: 84 TAB | Refills: 3 | Status: SHIPPED | OUTPATIENT
Start: 2020-10-23 | End: 2020-10-23 | Stop reason: SDUPTHER

## 2020-10-23 RX ORDER — DROSPIRENONE AND ETHINYL ESTRADIOL 0.03MG-3MG
1 KIT ORAL DAILY
Qty: 84 TAB | Refills: 3 | Status: SHIPPED | OUTPATIENT
Start: 2020-10-23 | End: 2021-01-26 | Stop reason: SDUPTHER

## 2020-10-23 ASSESSMENT — FIBROSIS 4 INDEX: FIB4 SCORE: 0.4

## 2020-10-26 PROBLEM — F41.0 PANIC ATTACKS: Status: ACTIVE | Noted: 2020-10-26

## 2020-10-26 PROBLEM — F33.2 SEVERE EPISODE OF RECURRENT MAJOR DEPRESSIVE DISORDER, WITHOUT PSYCHOTIC FEATURES (HCC): Status: ACTIVE | Noted: 2020-10-26

## 2020-10-26 PROBLEM — Z30.011 ENCOUNTER FOR INITIAL PRESCRIPTION OF CONTRACEPTIVE PILLS: Status: ACTIVE | Noted: 2020-10-26

## 2020-10-26 NOTE — PROGRESS NOTES
Chief Complaint   Patient presents with   • Follow-Up     Anxiety    • Medication Management     BC        HISTORY OF PRESENT ILLNESS: Farideh Cunha is an established 30 y.o. female here to discuss the evaluation and management of:    Patient is here to follow-up on anxiety, depression, panic attacks, and discuss switching oral contraceptives to a different type of oral contraceptive.  During her last appointment on 10/8/2020 patient was prescribed a 14 lorazepam for panic attacks and it was emphasized that it was for acute use only.  States she is use medication sparingly and still has medication.  Patient is  from her long-term partner and she has filed for divorce.  He tells me her partner has been unfaithful and she is heartbroken.  She tells me that symptoms are unchanged since our last appointment.  States she is developing healthy coping mechanisms for anxiety and depression.  States she is talking to her family frequently, playing with her birds, and going for short walks.  States she is afraid to leave the apartment because she is afraid that her partner will try to take over the department.  States she will be following up with a therapist on 11/18/2020.    Patient is requesting for her oral contraceptive Quasense changed to a control that she will have a menses every month.  States on her current oral contraceptive she is having a menses every 4 months.  States when she does have premenstrual every 4 months she experiences heavy.  That last 7 days in duration.  States she also experiences severe cramping she treats with over-the-counter ibuprofen or Midol with some improvement of symptoms.  She is unsure if this oral contraceptive has caused weight      Patient Active Problem List    Diagnosis Date Noted   • Anxiety 04/22/2020   • Chronic midline low back pain with right-sided sciatica 11/01/2018   • Hashimoto's disease 09/13/2018   • Menorrhagia with irregular cycle 09/13/2018   • GERD  (gastroesophageal reflux disease) 09/13/2018   • Obesity (BMI 30-39.9) 09/13/2018       Allergies:Bactrim [sulfamethoxazole w-trimethoprim], Ceftin [cefuroxime], and Ciprofloxacin    Current Outpatient Medications   Medication Sig Dispense Refill   • drospirenone-ethinyl estradiol (RICHARD) 3-0.03 MG per tablet Take 1 Tab by mouth every day. 84 Tab 3   • estradiol (ESTRACE) 0.1 MG/GM vaginal cream VIC PEA SIZED AMT VAGINALLY HS     • nitrofurantoin (MACROBID) 100 MG Cap      • omeprazole (PRILOSEC) 10 MG CAPSULE DELAYED RELEASE Take 1 Cap by mouth two times a day may change times on alt/days. 180 Cap 3   • colestipol (COLESTID) 1 GM Tab Take two tablets by mouth twice a day. 120 Tab 11   • thyroid (NP THYROID) 90 MG Tab Take 1 Tab by mouth every day. 90 Tab 3   • Meth-Hyo-M Bl-Na Phos-Ph Sal (URIBEL) 118 MG Cap Take 1 Capsule by mouth. Take 1 capsule by mouth two times a day     • hydrOXYzine HCl (ATARAX) 50 MG Tab Take 50 mg by mouth 3 times a day as needed for Itching.     • fluticasone (FLONASE) 50 MCG/ACT nasal spray Spray 1 Spray in nose every day. 16 g 6   • amitriptyline (ELAVIL) 10 MG Tab Take 10 mg by mouth every day.  1     No current facility-administered medications for this visit.        Social History     Tobacco Use   • Smoking status: Never Smoker   • Smokeless tobacco: Never Used   Substance Use Topics   • Alcohol use: No   • Drug use: No       Family Status   Relation Name Status   • MGFa  (Not Specified)   • PGMo  (Not Specified)   • PGFa  (Not Specified)   • Mo  Alive   • Fa  Alive   • Neg Hx  (Not Specified)     Family History   Problem Relation Age of Onset   • Diabetes Maternal Grandfather    • Diabetes Paternal Grandmother    • Diabetes Paternal Grandfather    • No Known Problems Mother    • No Known Problems Father    • Cancer Neg Hx    • Heart Disease Neg Hx    • Stroke Neg Hx    • Alcohol/Drug Neg Hx        ROS:  Review of Systems   Constitutional: Negative for fever, chills, weight loss  "and malaise/fatigue.   HENT: Negative for ear pain, nosebleeds, congestion, sore throat and neck pain.    Eyes: Negative for blurred vision.   Respiratory: Negative for cough, sputum production, shortness of breath and wheezing.    Cardiovascular: Negative for chest pain, palpitations, orthopnea and leg swelling.   Gastrointestinal: Negative for heartburn, nausea, vomiting and abdominal pain.   Genitourinary: Negative for dysuria, urgency and frequency.   Musculoskeletal: Negative for myalgias, back pain and joint pain.   Skin: Negative for rash and itching.   Neurological: Negative for dizziness, tingling, tremors, sensory change, focal weakness and headaches.   Endo/Heme/Allergies: Does not bruise/bleed easily.   Psychiatric/Behavioral: Negative for suicidal ideas and memory loss.  + for anxiety, depression, sleep deprivation.  All other systems reviewed and are negative except as in HPI.    Exam: /70 (BP Location: Left arm, Patient Position: Sitting, BP Cuff Size: Adult)   Pulse 88   Temp 36.4 °C (97.5 °F)   Resp 16   Ht 1.651 m (5' 5\")   Wt 74.3 kg (163 lb 12.8 oz)   SpO2 90%  Body mass index is 27.26 kg/m².  General: Normal appearing. No distress.  HEENT: Normocephalic. Eyes conjunctiva clear lids without ptosis,  ears normal shape and contour.  Neck: Supple without JVD. Thyroid is not enlarged.  Pulmonary: Clear to ausculation.  Normal effort. No rales, ronchi, or wheezing.  Cardiovascular: Regular rate and rhythm without murmur.   Abdomen: Nondistended.   Neurologic: Grossly nonfocal.  Cranial nerves are normal.   Skin: Warm and dry.  No rashes or suspicious skin lesions.  Musculoskeletal: Normal gait. No extremity cyanosis, clubbing, or edema.  Psych: Normal mood and affect. Alert and oriented x3. Judgment and insight is normal.    Medical decision-making and discussion:  1. Encounter for initial prescription of contraceptive pills  Discussed OCPs at length. Instructed on importance of taking " pill at the same time every day. Use back-up for up to 2 weeks. Discussed risks associated with OCPs including: blood clot, heart attack, and stroke. Advised not to smoke while on hormonal birth control.    Advised patient to begin birth control on the first Sunday after her period begins (because most pill packs are arranged for a Sunday start to avoid withdrawal bleeding on a weekend). Some form of back-up contraception is needed for the first seven days because the full contraceptive effect might not be provided immediately.    - drospirenone-ethinyl estradiol (RICHARD) 3-0.03 MG per tablet; Take 1 Tab by mouth every day.  Dispense: 84 Tab; Refill: 3    2. Anxiety  3. Severe episode of recurrent major depressive disorder, without psychotic features (HCC)  4. Panic attacks  Patient has suffered from recurrent major depressive disorder due to chronic medical conditions.  She is having an acute exacerbation of depression and is also experiencing anxiety and panic attacks.  Patient recently found out that her  of 10 years is having an affair.  Advised patient continue work on diet, exercise, sleep hygiene, hydration, and developing healthy coping mechanisms for stress anxiety.  She denies homicidal or suicidal ideation.  Discussed that should the patient have any symptoms they should call suicide prevention hotline or report to the emergency room immediately.     She will follow-up in 2 weeks for reevaluation.      Please note that this dictation was created using voice recognition software. I have made every reasonable attempt to correct obvious errors, but I expect that there are errors of grammar and possibly content that I did not discover before finalizing the note.    Assessment/Plan:  1. Encounter for initial prescription of contraceptive pills  drospirenone-ethinyl estradiol (RICHARD) 3-0.03 MG per tablet    DISCONTINUED: drospirenone-ethinyl estradiol (RICHARD) 3-0.03 MG per tablet   2. Anxiety     3.  Severe episode of recurrent major depressive disorder, without psychotic features (HCC)     4. Panic attacks         Return in about 2 weeks (around 11/6/2020).

## 2020-11-10 ENCOUNTER — TELEPHONE (OUTPATIENT)
Dept: MEDICAL GROUP | Facility: PHYSICIAN GROUP | Age: 30
End: 2020-11-10

## 2020-11-10 ENCOUNTER — OFFICE VISIT (OUTPATIENT)
Dept: MEDICAL GROUP | Facility: PHYSICIAN GROUP | Age: 30
End: 2020-11-10
Payer: COMMERCIAL

## 2020-11-10 VITALS
WEIGHT: 161.8 LBS | BODY MASS INDEX: 26.96 KG/M2 | HEART RATE: 92 BPM | TEMPERATURE: 98.4 F | HEIGHT: 65 IN | RESPIRATION RATE: 16 BRPM | SYSTOLIC BLOOD PRESSURE: 116 MMHG | OXYGEN SATURATION: 99 % | DIASTOLIC BLOOD PRESSURE: 70 MMHG

## 2020-11-10 DIAGNOSIS — F41.0 PANIC ATTACKS: ICD-10-CM

## 2020-11-10 DIAGNOSIS — K58.9 IRRITABLE BOWEL SYNDROME, UNSPECIFIED TYPE: ICD-10-CM

## 2020-11-10 DIAGNOSIS — F41.9 ANXIETY: ICD-10-CM

## 2020-11-10 DIAGNOSIS — K64.1 GRADE II HEMORRHOIDS: ICD-10-CM

## 2020-11-10 PROCEDURE — 99214 OFFICE O/P EST MOD 30 MIN: CPT | Performed by: PHYSICIAN ASSISTANT

## 2020-11-10 RX ORDER — LORAZEPAM 0.5 MG/1
0.5 TABLET ORAL
Qty: 10 TAB | Refills: 0 | Status: SHIPPED | OUTPATIENT
Start: 2020-11-10 | End: 2020-11-25 | Stop reason: SDUPTHER

## 2020-11-10 NOTE — TELEPHONE ENCOUNTER
VOICEMAIL  1. Caller Name: Roxy Myles                      Call Back Number: [phone]    2. Message: Called to inform [facility] Colestipol 1GM is out of stock and no estimate at this time of when they will get it back in.  Are requesting a new med to replace, but ask to make it pill form as the Pt does not do well with powders.      Please advise.

## 2020-11-10 NOTE — PROGRESS NOTES
Chief Complaint   Patient presents with   • Follow-Up     hemmroids    • Anxiety       HISTORY OF PRESENT ILLNESS: Farideh Cunha is an established 30 y.o. female here to discuss the evaluation and management of:    Patient is a pleasant 30-year-old female here today to follow-up on anxiety and discuss a hemorrhoid.  She and her  recently  and are moving forth with a divorce.  Patient has been parenting exacerbation of anxiety and more frequent panic attacks.  States she has irritable bowel syndrome and suffers from frequent loose bowel movements.  States in the past several weeks since anxiety has exacerbated she is experiencing 6 loose stools per day.  She tells me she recently developed a hemorrhoid.  Denies bleeding with bowel movements or blood on the toilet paper or in the toilet.  Admits to slight pain with bowel movements.  States hemorrhoid is reducible.  She tells me that she has been treating hemorrhoid symptoms with warm baths, Preparation H suppositories, and ice.  States she is using one preparation H suppository per day and has been doing this for 5 days.  She does not feel symptoms are improving.  She is following up with her gastroenterologist in 1 week.  She tells me that is been increasing her fiber in the last 2 days stools have been more bulkier.    Patient is requesting a refill for diazepam.  States she is following up with behavioral health for cognitive behavioral therapy on 11/18/2020.  She denies misuse or abuse of medication.  Patient is aware this is for acute use only and not long-term use.  She tells me that she has been having difficulties falling and staying asleep due to anxiety and panic attacks.  States in the past she has tried amitriptyline it worked well for her but exacerbated dizziness symptoms.  She does take hydroxyzine once daily and on total takes it 2-3 weeks month.  She does feel that it mildly help insomnia and anxiety symptoms.  Patient denies  homicidal or suicidal ideation.  She denies having regular exercise routine at this time.  She admits to staying hydrated.      Patient Active Problem List    Diagnosis Date Noted   • Severe episode of recurrent major depressive disorder, without psychotic features ([facility]) 10/26/2020   • Panic attacks 10/26/2020   • Encounter for initial prescription of contraceptive pills 10/26/2020   • Anxiety 04/22/2020   • Chronic midline low back pain with right-sided sciatica 11/01/2018   • Hashimoto's disease 09/13/2018   • Menorrhagia with irregular cycle 09/13/2018   • GERD (gastroesophageal reflux disease) 09/13/2018   • Obesity (BMI 30-39.9) 09/13/2018       Allergies:Bactrim [sulfamethoxazole w-trimethoprim], Ceftin [cefuroxime], and Ciprofloxacin    Current Outpatient Medications   Medication Sig Dispense Refill   • LORazepam (ATIVAN) 0.5 MG Tab Take 1 Tab by mouth 1 time daily as needed for Anxiety for up to 30 days. 10 Tab 0   • drospirenone-ethinyl estradiol (RICHARD) 3-0.03 MG per tablet Take 1 Tab by mouth every day. 84 Tab 3   • estradiol (ESTRACE) 0.1 MG/GM vaginal cream VIC PEA SIZED AMT VAGINALLY HS     • nitrofurantoin (MACROBID) 100 MG Cap      • omeprazole (PRILOSEC) 10 MG CAPSULE DELAYED RELEASE Take 1 Cap by mouth two times a day may change times on alt/days. 180 Cap 3   • colestipol (COLESTID) 1 GM Tab Take two tablets by mouth twice a day. 120 Tab 11   • thyroid (NP THYROID) 90 MG Tab Take 1 Tab by mouth every day. 90 Tab 3   • Meth-Hyo-M Bl-Na Phos-Ph Sal (URIBEL) 118 MG Cap Take 1 Capsule by mouth. Take 1 capsule by mouth two times a day     • hydrOXYzine HCl (ATARAX) 50 MG Tab Take 50 mg by mouth 3 times a day as needed for Itching.     • fluticasone (FLONASE) 50 MCG/ACT nasal spray Spray 1 Spray in nose every day. 16 g 6   • amitriptyline (ELAVIL) 10 MG Tab Take 10 mg by mouth every day.  1     No current facility-administered medications for this visit.        Social History     Tobacco Use   • Smoking  "status: Never Smoker   • Smokeless tobacco: Never Used   Substance Use Topics   • Alcohol use: No   • Drug use: No       Family Status   Relation Name Status   • MGFa  (Not Specified)   • PGMo  (Not Specified)   • PGFa  (Not Specified)   • Mo  Alive   • Fa  Alive   • Neg Hx  (Not Specified)     Family History   Problem Relation Age of Onset   • Diabetes Maternal Grandfather    • Diabetes Paternal Grandmother    • Diabetes Paternal Grandfather    • No Known Problems Mother    • No Known Problems Father    • Cancer Neg Hx    • Heart Disease Neg Hx    • Stroke Neg Hx    • Alcohol/Drug Neg Hx        ROS:  Review of Systems   Constitutional: Negative for fever, chills, weight loss and malaise/fatigue.   HENT: Negative for ear pain, nosebleeds, congestion, sore throat and neck pain.    Eyes: Negative for blurred vision.   Respiratory: Negative for cough, sputum production, shortness of breath and wheezing.    Cardiovascular: Negative for chest pain, palpitations, orthopnea and leg swelling.   Gastrointestinal: Negative for heartburn, nausea, vomiting and abdominal pain.  Positive for mild rectal pain with bowel movements.  Positive for hemorrhoid.  Genitourinary: Negative for dysuria, urgency and frequency.   Musculoskeletal: Negative for myalgias, back pain and joint pain.   Skin: Negative for rash and itching.   Neurological: Negative for dizziness, tingling, tremors, sensory change, focal weakness and headaches.   Endo/Heme/Allergies: Does not bruise/bleed easily.   Psychiatric/Behavioral: Negative for suicidal ideas and memory loss.  Positive for anxiety, depression, insomnia.  All other systems reviewed and are negative except as in HPI.    Exam: /70 (BP Location: Left arm, Patient Position: Sitting, BP Cuff Size: Adult)   Pulse 92   Temp 36.9 °C (98.4 °F) (Temporal)   Resp 16   Ht 1.651 m (5' 5\")   Wt 73.4 kg (161 lb 12.8 oz)   SpO2 99%  Body mass index is 26.92 kg/m².  General: Normal appearing. No " distress.  HEENT: Normocephalic. Eyes conjunctiva clear lids without ptosis, pupils equal and reactive to light accommodation, ears normal shape and contour.  Neck: Supple without JVD or bruit. Thyroid is not enlarged.  Pulmonary: Clear to ausculation.  Normal effort. No rales, ronchi, or wheezing.  Cardiovascular: Regular rate and rhythm without murmur.   Abdomen: Soft, nontender, nondistended.  Neurologic: Grossly nonfocal.  Cranial nerves are normal.   Skin: Warm and dry.  No rashes or suspicious skin lesions.  Musculoskeletal: Normal gait. No extremity cyanosis, clubbing, or edema.  Psych: Normal mood and affect. Alert and oriented x3. Judgment and insight is normal.    Medical decision-making and discussion:  1. Grade II hemorrhoids  Patient showed me a picture off of her phone of her hemorrhoid.  When reviewing picture does not look like hemorrhoid is bleeding or thrombosed.  Continue sitz bath.  Advised patient to use over-the-counter hydrocortisone suppositories twice a day for 2 weeks.  Continue work on diet, exercise, sleep hygiene, hydration, and developing healthy coping mechanisms for stress anxiety.  Suggested a donut pillow.      2. Irritable bowel syndrome, unspecified type  Advised patient to continue working on diet.  Continue developing healthy coping mechanisms for stress and anxiety.  Suggested over-the-counter IBgard as a treatment option.  Patient is following up with her gastroenterology in 1 week for reevaluation.  Continue to monitor.    3. Anxiety  4. Panic attacks     She is having an acute exacerbation of depression and is also experiencing anxiety and panic attacks.    Patient is following up with behavioral health on 11/18/2020 for cognitive behavioral therapy.  She plans on keeping appointment.  Patient is requesting refill for lorazepam.   Patient has been prescribed Lorazepam for acute panic attacks and discussed this is for acute use only.  Patient did not drink alcohol, combine  other stated medications or operate heavy general taken prescribed on occasion.  Patient agreed to plan. Advised patient continue work on diet, exercise, sleep hygiene, hydration, and developing healthy coping mechanisms for stress anxiety.  She denies homicidal or suicidal ideation.  Discussed that should the patient have any symptoms they should call suicide prevention hotline or report to the emergency room immediately.    Discussed with patient if she feels that she needs lorazepam more frequent than not then we need to discuss a daily antidepressant/antianxiety medication.  Discussed with patient benzodiazepines are for acute use only.  Discussed possible psychiatry referral with patient.  Patient follow-up in 2 and half weeks for evaluation.     She will follow-up in 1 week for reevaluation.  - LORazepam (ATIVAN) 0.5 MG Tab; Take 1 Tab by mouth 1 time daily as needed for Anxiety for up to 30 days.  Dispense: 10 Tab; Refill: 0      Please note that this dictation was created using voice recognition software. I have made every reasonable attempt to correct obvious errors, but I expect that there are errors of grammar and possibly content that I did not discover before finalizing the note.    Assessment/Plan:  1. Grade II hemorrhoids     2. Irritable bowel syndrome, unspecified type     3. Anxiety  LORazepam (ATIVAN) 0.5 MG Tab   4. Panic attacks  LORazepam (ATIVAN) 0.5 MG Tab       Return in about 3 weeks (around 12/1/2020).

## 2020-11-12 NOTE — TELEPHONE ENCOUNTER
Phone Number Called: [phone] (home)       Call outcome: Did not leave a detailed message. Requested patient to call back.    Message: call back for mercy's message

## 2020-11-13 NOTE — TELEPHONE ENCOUNTER
Phone Number Called: [phone] (home)     Call outcome: Left detailed message for patient. Informed to call back with any additional questions.    Message: call gi doctor for alternatives

## 2020-11-18 ENCOUNTER — TELEMEDICINE (OUTPATIENT)
Dept: BEHAVIORAL HEALTH | Facility: CLINIC | Age: 30
End: 2020-11-18
Payer: COMMERCIAL

## 2020-11-18 DIAGNOSIS — F41.0 SEVERE ANXIETY WITH PANIC: ICD-10-CM

## 2020-11-18 DIAGNOSIS — Z63.5 DISRUPTION OF FAMILY BY SEPARATION AND DIVORCE: ICD-10-CM

## 2020-11-18 DIAGNOSIS — F33.2 SEVERE EPISODE OF RECURRENT MAJOR DEPRESSIVE DISORDER, WITHOUT PSYCHOTIC FEATURES (HCC): ICD-10-CM

## 2020-11-18 PROCEDURE — 90791 PSYCH DIAGNOSTIC EVALUATION: CPT | Performed by: PSYCHOLOGIST

## 2020-11-18 SDOH — SOCIAL STABILITY - SOCIAL INSECURITY: DISRUPTION OF FAMILY BY SEPARATION AND DIVORCE: Z63.5
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
## 2020-11-25 DIAGNOSIS — F41.9 ANXIETY: ICD-10-CM

## 2020-11-25 DIAGNOSIS — F41.0 PANIC ATTACKS: ICD-10-CM

## 2020-11-25 RX ORDER — LORAZEPAM 0.5 MG/1
0.5 TABLET ORAL
Qty: 10 TAB | Refills: 0 | Status: SHIPPED | OUTPATIENT
Start: 2020-11-25 | End: 2020-12-15 | Stop reason: SDUPTHER

## 2020-12-01 ENCOUNTER — TELEMEDICINE (OUTPATIENT)
Dept: BEHAVIORAL HEALTH | Facility: CLINIC | Age: 30
End: 2020-12-01
Payer: COMMERCIAL

## 2020-12-01 DIAGNOSIS — F43.23 ADJUSTMENT DISORDER WITH MIXED ANXIETY AND DEPRESSED MOOD: ICD-10-CM

## 2020-12-01 DIAGNOSIS — Z63.5 DISRUPTION OF FAMILY BY SEPARATION AND DIVORCE: ICD-10-CM

## 2020-12-01 DIAGNOSIS — F33.2 SEVERE EPISODE OF RECURRENT MAJOR DEPRESSIVE DISORDER, WITHOUT PSYCHOTIC FEATURES (HCC): ICD-10-CM

## 2020-12-01 PROCEDURE — 90834 PSYTX W PT 45 MINUTES: CPT | Mod: 95,CR | Performed by: PSYCHOLOGIST

## 2020-12-01 SDOH — SOCIAL STABILITY - SOCIAL INSECURITY: DISRUPTION OF FAMILY BY SEPARATION AND DIVORCE: Z63.5

## 2020-12-02 PROBLEM — F43.23 ADJUSTMENT DISORDER WITH MIXED ANXIETY AND DEPRESSED MOOD: Status: ACTIVE | Noted: 2020-12-02
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
## 2020-12-08 ENCOUNTER — APPOINTMENT (OUTPATIENT)
Dept: MEDICAL GROUP | Facility: PHYSICIAN GROUP | Age: 30
End: 2020-12-08
Payer: COMMERCIAL

## 2020-12-11 ENCOUNTER — TELEMEDICINE (OUTPATIENT)
Dept: BEHAVIORAL HEALTH | Facility: CLINIC | Age: 30
End: 2020-12-11
Payer: COMMERCIAL

## 2020-12-11 DIAGNOSIS — F43.23 ADJUSTMENT DISORDER WITH MIXED ANXIETY AND DEPRESSED MOOD: ICD-10-CM

## 2020-12-11 DIAGNOSIS — F33.2 SEVERE EPISODE OF RECURRENT MAJOR DEPRESSIVE DISORDER, WITHOUT PSYCHOTIC FEATURES (HCC): ICD-10-CM

## 2020-12-11 PROCEDURE — 90834 PSYTX W PT 45 MINUTES: CPT | Mod: 95,CR | Performed by: PSYCHOLOGIST

## 2020-12-15 ENCOUNTER — TELEMEDICINE (OUTPATIENT)
Dept: MEDICAL GROUP | Facility: PHYSICIAN GROUP | Age: 30
End: 2020-12-15
Payer: COMMERCIAL

## 2020-12-15 VITALS — BODY MASS INDEX: 24.83 KG/M2 | HEIGHT: 65 IN | WEIGHT: 149 LBS | RESPIRATION RATE: 16 BRPM

## 2020-12-15 DIAGNOSIS — F41.0 PANIC ATTACKS: ICD-10-CM

## 2020-12-15 DIAGNOSIS — G47.09 OTHER INSOMNIA: ICD-10-CM

## 2020-12-15 DIAGNOSIS — F41.9 ANXIETY: ICD-10-CM

## 2020-12-15 PROCEDURE — 99214 OFFICE O/P EST MOD 30 MIN: CPT | Mod: 95,CR | Performed by: PHYSICIAN ASSISTANT

## 2020-12-15 RX ORDER — CITALOPRAM HYDROBROMIDE 10 MG/1
TABLET ORAL
Qty: 30 TAB | Refills: 2 | Status: SHIPPED | OUTPATIENT
Start: 2020-12-15 | End: 2021-04-23

## 2020-12-15 RX ORDER — LORAZEPAM 0.5 MG/1
0.5 TABLET ORAL
Qty: 10 TAB | Refills: 0 | Status: SHIPPED | OUTPATIENT
Start: 2020-12-15 | End: 2021-01-14

## 2020-12-15 RX ORDER — HYDROXYZINE 50 MG/1
100 TABLET, FILM COATED ORAL 3 TIMES DAILY PRN
Qty: 180 TAB | Refills: 2 | Status: SHIPPED | OUTPATIENT
Start: 2020-12-15 | End: 2021-09-03 | Stop reason: SDUPTHER

## 2020-12-15 NOTE — PROGRESS NOTES
Virtual Visit: Established Patient   This visit was conducted via Zoom using secure and encrypted videoconferencing technology. The patient was in a private location in the state of [place].    The patient's identity was confirmed and verbal consent was obtained for this virtual visit.    Subjective:   CC: Anxiety and panic attacks  Chief Complaint   Patient presents with   • Anxiety   • Depression       Farideh Cunha is a 30 y.o. female presenting for evaluation and management of:    Patient is a pleasant 30-year-old female here today to discuss antidepressant/antianxiety medication.  She tells me that she is following up with a therapist once weekly.  Patient is also been socializing and hanging out with friends as well as going to the gym 3-4 times a week.  States her exercise routine consist of elliptical 20 minutes per gym session and weights 20 minutes per gym session.  States she is also looking for jobs.  She tells me she is trying to get adjusted to all the changes.  Patient is currently going through divorce.  States she is having obsessive thoughts about her ex.  States when they do communicate he is very mean and this causes her deep sadness.  States she still having difficulty sleeping and on average sleeps 4-5 hours per night.  States he has difficulty falling and staying asleep.  Currently takes hydroxyzine 50 mg tab once nightly for sleep with no improvement of symptoms.  Patient is prescribed lorazepam 0.5 mg tab for acute use only.  Last refill was on 12/3/2020 for total of 10 tablets.  She tells me that she is already ablated the prescription and is requesting refill.  States she is having more panic attacks since she has been living at her mom's.  Denies homicidal or suicidal ideation.    ROS   Denies any recent fevers or chills. No nausea or vomiting. No chest pains or shortness of breath.     Allergies   Allergen Reactions   • Bactrim [Sulfamethoxazole W-Trimethoprim] Swelling      swelling     • Ceftin [Cefuroxime] Swelling     Throat swelling   • Ciprofloxacin Swelling       Current medicines (including changes today)  Current Outpatient Medications   Medication Sig Dispense Refill   • LORazepam (ATIVAN) 0.5 MG Tab Take 1 Tab by mouth 1 time a day as needed for Anxiety for up to 30 days. 10 Tab 0   • hydrOXYzine HCl (ATARAX) 50 MG Tab Take 2 Tabs by mouth 3 times a day as needed for Anxiety. 180 Tab 2   • citalopram (CELEXA) 10 MG tablet Take 1/2 tablet by mouth once daily for 1 week and then increase to full tablet by mouth once daily on week 2. 30 Tab 2   • drospirenone-ethinyl estradiol (RICHARD) 3-0.03 MG per tablet Take 1 Tab by mouth every day. 84 Tab 3   • estradiol (ESTRACE) 0.1 MG/GM vaginal cream VIC PEA SIZED AMT VAGINALLY HS     • nitrofurantoin (MACROBID) 100 MG Cap      • omeprazole (PRILOSEC) 10 MG CAPSULE DELAYED RELEASE Take 1 Cap by mouth two times a day may change times on alt/days. 180 Cap 3   • colestipol (COLESTID) 1 GM Tab Take two tablets by mouth twice a day. 120 Tab 11   • thyroid (NP THYROID) 90 MG Tab Take 1 Tab by mouth every day. 90 Tab 3   • Meth-Hyo-M Bl-Na Phos-Ph Sal (URIBEL) 118 MG Cap Take 1 Capsule by mouth. Take 1 capsule by mouth two times a day     • fluticasone (FLONASE) 50 MCG/ACT nasal spray Spray 1 Spray in nose every day. 16 g 6   • amitriptyline (ELAVIL) 10 MG Tab Take 10 mg by mouth every day.  1     No current facility-administered medications for this visit.        Patient Active Problem List    Diagnosis Date Noted   • Adjustment disorder with mixed anxiety and depressed mood 12/02/2020   • Disruption of family by separation and divorce 11/18/2020   • Severe episode of recurrent major depressive disorder, without psychotic features (HCC) 10/26/2020   • Severe anxiety with panic 10/26/2020   • Encounter for initial prescription of contraceptive pills 10/26/2020   • Anxiety 04/22/2020   • Chronic midline low back pain with right-sided sciatica 11/01/2018  "  • Hashimoto's disease 09/13/2018   • Menorrhagia with irregular cycle 09/13/2018   • GERD (gastroesophageal reflux disease) 09/13/2018   • Obesity (BMI 30-39.9) 09/13/2018       Family History   Problem Relation Age of Onset   • Diabetes Maternal Grandfather    • Diabetes Paternal Grandmother    • Diabetes Paternal Grandfather    • Anxiety disorder Mother    • Alcohol abuse Mother    • Depression Father    • Cancer Neg Hx    • Heart Disease Neg Hx    • Stroke Neg Hx    • Alcohol/Drug Neg Hx        She  has a past medical history of Anxiety, Chronic pain, Depression, GERD (gastroesophageal reflux disease), Panic disorder, Thyroid disease, and Urinary tract infection.  She  has a past surgical history that includes cholecystectomy.       Objective:   Resp 16   Ht 1.651 m (5' 5\") Comment: per patient  Wt 67.6 kg (149 lb) Comment: per patient  BMI 24.79 kg/m²     Physical Exam:  Constitutional: Alert, no distress, well-groomed.  Skin: No rashes in visible areas.  Eye: Round. Conjunctiva clear, lids normal. No icterus.   ENMT: Lips pink without lesions, good dentition, moist mucous membranes. Phonation normal.  Neck: No masses, no thyromegaly. Moves freely without pain.  Respiratory: Unlabored respiratory effort, no cough or audible wheeze  Psych: Alert and oriented x3, normal affect and mood.       Assessment and Plan:   The following treatment plan was discussed:     1. Anxiety  2. Panic attacks  3. Other insomnia  PDMP was reviewed.  Last refill for lorazepam 0.5 mg total of 10 tablets was on 12/3/2020.  During today's appointment refilled lorazepam 0.5 mg tab.  Patient was provided 10 tablets and advised patient the prescription needs last for 30 days.  Discussed importance of only using medication for acute use only and if absolutely needed.  Patient verbally consented she would.  Increase hydroxyzine from 50 mg tab once daily to 100 mg tablets nightly.  Suggested for patient to combine over-the-counter " melatonin with hydroxyzine.  Advised patient to start on the lower end of melatonin and she can increase up to 10 mg of melatonin once nightly if needed.  Patient good to plan.  Patient is also been prescribed Celexa 10 mg tab advised take half a tablet by mouth once daily for 1 week and then increase to full tablet by mouth once daily on week 2.  Discussed common side effects and adverse reactions of medications with patient.  Advised patient she may experience unpleasant side effects for 2+ weeks after starting Celexa but side effect symptoms should subside and she should start feel the benefits of medication around weeks 4-6.  Discussed black box warning with patient.  Advised patient continue work on diet, exercise, sleep hygiene, hydration, and developing healthy coping mechanisms for stress anxiety.  Advised patient to make sure that she is getting adequate caloric intake.  Continue following up with therapist.    Denies any suicidal or homicidal ideation. Discussed that should the patient have any symptoms they should call suicide prevention hotline or report to the emergency room immediately.      - LORazepam (ATIVAN) 0.5 MG Tab; Take 1 Tab by mouth 1 time a day as needed for Anxiety for up to 30 days.  Dispense: 10 Tab; Refill: 0  - hydrOXYzine HCl (ATARAX) 50 MG Tab; Take 2 Tabs by mouth 3 times a day as needed for Anxiety.  Dispense: 180 Tab; Refill: 2  - citalopram (CELEXA) 10 MG tablet; Take 1/2 tablet by mouth once daily for 1 week and then increase to full tablet by mouth once daily on week 2.  Dispense: 30 Tab; Refill: 2        Follow-up: Return in about 3 years (around 12/15/2023), or if symptoms worsen or fail to improve.

## 2020-12-18 ENCOUNTER — TELEMEDICINE (OUTPATIENT)
Dept: BEHAVIORAL HEALTH | Facility: CLINIC | Age: 30
End: 2020-12-18
Payer: COMMERCIAL

## 2020-12-18 DIAGNOSIS — F33.2 SEVERE EPISODE OF RECURRENT MAJOR DEPRESSIVE DISORDER, WITHOUT PSYCHOTIC FEATURES (HCC): ICD-10-CM

## 2020-12-18 DIAGNOSIS — F43.23 ADJUSTMENT DISORDER WITH MIXED ANXIETY AND DEPRESSED MOOD: ICD-10-CM

## 2020-12-18 PROCEDURE — 90834 PSYTX W PT 45 MINUTES: CPT | Mod: 95,CR | Performed by: PSYCHOLOGIST

## 2020-12-24 ENCOUNTER — APPOINTMENT (OUTPATIENT)
Dept: BEHAVIORAL HEALTH | Facility: CLINIC | Age: 30
End: 2020-12-24
Payer: COMMERCIAL

## 2021-01-07 NOTE — BH THERAPY
[facility]  Therapy Progress Note    Patient Name: Farideh Cunha  Patient MRN: [id number]  Today's Date: 12/18/2020     Type of session:Individual psychotherapy  Length of session: 45 minutes  Persons in attendance:Patient    Subjective/New Info: This evaluation was conducted via Zoom using secure and encrypted videoconferencing technology. The patient was in a private location in the [facility].    The patient's identity was confirmed and verbal consent was obtained for this virtual visit.    Patient reflected on how depended she has become on her ex and how scary it is for her to be alone and do things on her own.  Examined her strengths and the evidence from her past of her being fully capable of doing this.  Patient is looking for work and has some interviews lined up.    Objective/Observations:   Participation: Active verbal participation, Attentive and Engaged   Grooming: Casual   Cognition: Alert and Fully Oriented   Eye contact: Good   Mood: Depressed and Anxious   Affect: Sad and Anxious   Thought process: Logical and Goal-directed   Speech: Rate within normal limits   Other:     Diagnoses:   1. Severe episode of recurrent major depressive disorder, without psychotic features (HCC)    2. Adjustment disorder with mixed anxiety and depressed mood         Current risk:   SUICIDE: Low   Homicide: Not applicable   Self-harm: Not applicable   Relapse: Not applicable   Other:    Safety Plan reviewed? Not Indicated   If evidence of imminent risk is present, intervention/plan: Pt denies SI, but knows to call 911 if having thoughts with intent    Therapeutic Intervention(s): Clarify:  Clarify feelings and Clarify thoughts, Cognitive modification, Distress tolerance skills, Stressors assessed and Supportive psychotherapy    Treatment Goal(s)/Objective(s) addressed: Tx Goals:  - Utilize learned skills to manage mood and emotional suffering more effectively  - Utilize learned assertiveness skills to  set boundaries & get needs met  - Successfully challenge & modify co-dependent behaviors  - Identify goals/values and cultivate a meaningful life  - Process & resolve issues of grief and loss  - Learn to ameliorate effects of anxiety/panic on life and functioning  - Increase behaviors of self-compassion and self-care  - Secure a job or school admission towards future professional goals  - The patient was educated to call 911, call the suicide hotline, or go to local ER if having thoughts of suicide or homicide; verbalized understanding.       Progress toward Treatment Goals: No change    Plan:  - Continue Individual therapy    Karen Fuentes, Ph.D.  This dictation has been created using voice recognition software and/or scribes. The accuracy of the dictation is limited by the abilities of the software and the expertise of the scribes. I expect there may be some errors of grammar and possibly content. I made every attempt to manually correct the errors within my dictation. However, errors related to voice recognition software and/or scribes may still exist and should be interpreted within the appropriate context.
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
## 2021-01-12 ENCOUNTER — NON-PROVIDER VISIT (OUTPATIENT)
Dept: URGENT CARE | Facility: CLINIC | Age: 31
End: 2021-01-12
Payer: MEDICAID

## 2021-01-12 DIAGNOSIS — Z11.1 PPD SCREENING TEST: ICD-10-CM

## 2021-01-12 PROCEDURE — 86580 TB INTRADERMAL TEST: CPT | Performed by: PHYSICIAN ASSISTANT

## 2021-01-15 ENCOUNTER — NON-PROVIDER VISIT (OUTPATIENT)
Dept: URGENT CARE | Facility: CLINIC | Age: 31
End: 2021-01-15

## 2021-01-15 DIAGNOSIS — Z11.1 PPD SCREENING TEST: ICD-10-CM

## 2021-01-15 LAB — TB WHEAL 3D P 5 TU DIAM: NORMAL MM

## 2021-01-15 PROCEDURE — 99999 PR NO CHARGE: CPT | Performed by: NURSE PRACTITIONER

## 2021-01-21 DIAGNOSIS — Z30.011 ENCOUNTER FOR INITIAL PRESCRIPTION OF CONTRACEPTIVE PILLS: ICD-10-CM

## 2021-01-26 ENCOUNTER — TELEPHONE (OUTPATIENT)
Dept: MEDICAL GROUP | Facility: PHYSICIAN GROUP | Age: 31
End: 2021-01-26

## 2021-01-26 DIAGNOSIS — N92.1 MENORRHAGIA WITH IRREGULAR CYCLE: ICD-10-CM

## 2021-01-26 RX ORDER — DROSPIRENONE AND ETHINYL ESTRADIOL 0.03MG-3MG
1 KIT ORAL DAILY
Qty: 84 TAB | Refills: 3 | Status: SHIPPED | OUTPATIENT
Start: 2021-01-26 | End: 2021-08-31

## 2021-01-26 NOTE — TELEPHONE ENCOUNTER
Phone Number Called: Farideh Cunha    Call outcome: Spoke to patient regarding message below.    Message: spoke to patient in regard to birth control as pharmacy gave patient off brand again, then spoke to pharmacist  Jerrica at [facility] about birth control again they need a new script with it specifying to not give anything other then  Kimberly

## 2021-04-19 ENCOUNTER — OFFICE VISIT (OUTPATIENT)
Dept: URGENT CARE | Facility: CLINIC | Age: 31
End: 2021-04-19
Payer: MEDICAID

## 2021-04-19 ENCOUNTER — HOSPITAL ENCOUNTER (OUTPATIENT)
Facility: MEDICAL CENTER | Age: 31
End: 2021-04-19
Attending: NURSE PRACTITIONER
Payer: MEDICAID

## 2021-04-19 VITALS
BODY MASS INDEX: 25.62 KG/M2 | WEIGHT: 153.8 LBS | RESPIRATION RATE: 12 BRPM | DIASTOLIC BLOOD PRESSURE: 74 MMHG | HEART RATE: 95 BPM | HEIGHT: 65 IN | TEMPERATURE: 98.3 F | SYSTOLIC BLOOD PRESSURE: 112 MMHG | OXYGEN SATURATION: 98 %

## 2021-04-19 DIAGNOSIS — N76.0 ACUTE VAGINITIS: ICD-10-CM

## 2021-04-19 DIAGNOSIS — N89.8 VAGINAL DISCHARGE: ICD-10-CM

## 2021-04-19 LAB
INT CON NEG: NEGATIVE
INT CON POS: POSITIVE
POC URINE PREGNANCY TEST: NEGATIVE

## 2021-04-19 PROCEDURE — 99214 OFFICE O/P EST MOD 30 MIN: CPT | Performed by: NURSE PRACTITIONER

## 2021-04-19 PROCEDURE — 87480 CANDIDA DNA DIR PROBE: CPT

## 2021-04-19 PROCEDURE — 87510 GARDNER VAG DNA DIR PROBE: CPT

## 2021-04-19 PROCEDURE — 87491 CHLMYD TRACH DNA AMP PROBE: CPT

## 2021-04-19 PROCEDURE — 87591 N.GONORRHOEAE DNA AMP PROB: CPT

## 2021-04-19 PROCEDURE — 81025 URINE PREGNANCY TEST: CPT | Performed by: NURSE PRACTITIONER

## 2021-04-19 PROCEDURE — 87660 TRICHOMONAS VAGIN DIR PROBE: CPT

## 2021-04-19 RX ORDER — METRONIDAZOLE 500 MG/1
500 TABLET ORAL 2 TIMES DAILY
Qty: 10 TABLET | Refills: 0 | Status: SHIPPED | OUTPATIENT
Start: 2021-04-19 | End: 2021-04-24

## 2021-04-19 ASSESSMENT — ENCOUNTER SYMPTOMS
SHORTNESS OF BREATH: 0
MYALGIAS: 0
SORE THROAT: 0
FLANK PAIN: 0
CHILLS: 0
FEVER: 0
EYE PAIN: 0
DIZZINESS: 0
ABDOMINAL PAIN: 0
VOMITING: 0
CHANGE IN BOWEL HABIT: 0
NAUSEA: 0

## 2021-04-20 ENCOUNTER — TELEPHONE (OUTPATIENT)
Dept: URGENT CARE | Facility: CLINIC | Age: 31
End: 2021-04-20

## 2021-04-20 DIAGNOSIS — N76.0 ACUTE VAGINITIS: ICD-10-CM

## 2021-04-20 DIAGNOSIS — B37.9 YEAST INFECTION: ICD-10-CM

## 2021-04-20 DIAGNOSIS — N89.8 VAGINAL DISCHARGE: ICD-10-CM

## 2021-04-20 LAB
C TRACH DNA SPEC QL NAA+PROBE: NEGATIVE
CANDIDA DNA VAG QL PROBE+SIG AMP: POSITIVE
G VAGINALIS DNA VAG QL PROBE+SIG AMP: POSITIVE
N GONORRHOEA DNA SPEC QL NAA+PROBE: NEGATIVE
SPECIMEN SOURCE: NORMAL
T VAGINALIS DNA VAG QL PROBE+SIG AMP: NEGATIVE

## 2021-04-20 RX ORDER — FLUCONAZOLE 150 MG/1
150 TABLET ORAL DAILY
Qty: 2 TABLET | Refills: 0 | Status: SHIPPED | OUTPATIENT
Start: 2021-04-20 | End: 2021-05-28

## 2021-04-20 NOTE — PROGRESS NOTES
Subjective:   Farideh Cunha is a 30 y.o. female who presents for Sexually Transmitted Diseases (x 3 days with Yellowish odorous discharge after new partner.  )      Sexually Transmitted Diseases  This is a new problem. Episode onset: 3 days; recently had sexual STD exposure.  Experiencing thick white foul-smelling discharge. The problem occurs constantly. The problem has been unchanged. Associated symptoms include urinary symptoms. Pertinent negatives include no abdominal pain, change in bowel habit, chest pain, chills, fever, myalgias, nausea, rash, sore throat or vomiting. Nothing aggravates the symptoms. She has tried nothing for the symptoms. The treatment provided no relief.       Review of Systems   Constitutional: Negative for chills and fever.   HENT: Negative for sore throat.    Eyes: Negative for pain.   Respiratory: Negative for shortness of breath.    Cardiovascular: Negative for chest pain.   Gastrointestinal: Negative for abdominal pain, change in bowel habit, nausea and vomiting.   Genitourinary: Positive for dysuria. Negative for flank pain, frequency, hematuria and urgency.        Vaginal discharge, vaginal itching     Musculoskeletal: Negative for myalgias.   Skin: Negative for rash.   Neurological: Negative for dizziness.       Medications:    • amitriptyline Tabs  • citalopram  • colestipol Tabs  • drospirenone-ethinyl estradiol  • estradiol  • fluticasone  • hydrOXYzine HCl Tabs  • metroNIDAZOLE Tabs  • nitrofurantoin Caps  • omeprazole Cpdr  • thyroid Tabs  • Uribel Caps    Allergies: Bactrim [sulfamethoxazole w-trimethoprim], Ceftin [cefuroxime], and Ciprofloxacin    Problem List: Farideh Cunha has Hashimoto's disease; Menorrhagia with irregular cycle; GERD (gastroesophageal reflux disease); Obesity (BMI 30-39.9); Chronic midline low back pain with right-sided sciatica; Anxiety; Severe episode of recurrent major depressive disorder, without psychotic features (HCC); Severe anxiety with  "panic; Encounter for initial prescription of contraceptive pills; Disruption of family by separation and divorce; and Adjustment disorder with mixed anxiety and depressed mood on their problem list.    Surgical History:  Past Surgical History:   Procedure Laterality Date   • CHOLECYSTECTOMY         Past Social Hx: Farideh Cunha  reports that she has never smoked. She has never used smokeless tobacco. She reports that she does not drink alcohol and does not use drugs.     Past Family Hx:  Farideh Cunha family history includes Alcohol abuse in her mother; Anxiety disorder in her mother; Depression in her father; Diabetes in her maternal grandfather, paternal grandfather, and paternal grandmother.     Problem list, medications, and allergies reviewed by myself today in Epic.     Objective:     /74   Pulse 95   Temp 36.8 °C (98.3 °F) (Temporal)   Resp 12   Ht 1.651 m (5' 5\")   Wt 69.8 kg (153 lb 12.8 oz)   LMP 04/12/2021   SpO2 98%   BMI 25.59 kg/m²     Physical Exam  Constitutional:       Appearance: Normal appearance. She is not ill-appearing or toxic-appearing.   HENT:      Head: Normocephalic.      Right Ear: External ear normal.      Left Ear: External ear normal.      Nose: Nose normal.      Mouth/Throat:      Lips: Pink.      Mouth: Mucous membranes are moist.   Eyes:      General: Lids are normal.         Right eye: No discharge.         Left eye: No discharge.   Pulmonary:      Effort: Pulmonary effort is normal. No accessory muscle usage or respiratory distress.   Abdominal:      Tenderness: There is no abdominal tenderness. There is no right CVA tenderness or left CVA tenderness.   Genitourinary:     Comments: Deferred exam   Musculoskeletal:         General: Normal range of motion.      Cervical back: Full passive range of motion without pain.   Skin:     Coloration: Skin is not pale.   Neurological:      Mental Status: She is alert and oriented to person, place, and time.   Psychiatric:  "        Mood and Affect: Mood normal.         Thought Content: Thought content normal.         Assessment/Plan:     Diagnosis and associated orders:     1. Vaginal discharge  CHLAMYDIA/GC PCR URINE OR SWAB    VAGINAL PATHOGENS DNA PANEL    metroNIDAZOLE (FLAGYL) 500 MG Tab    POCT Urinalysis    POCT Pregnancy   2. Acute vaginitis  CHLAMYDIA/GC PCR URINE OR SWAB    VAGINAL PATHOGENS DNA PANEL    metroNIDAZOLE (FLAGYL) 500 MG Tab    POCT Urinalysis    POCT Pregnancy      Comments/MDM:     • Patient is a 30-year-old female who present with the stated above, urinalysis positive for leukocytes, patient experienced vaginal discharge with itching.  Symptoms concerning for possible bacterial vaginosis versus STD.  Patient would like to wait for treatment of STD until labs are confirmed.  At this time will treat for.  Did encourage patient to abstain from sexual intercourse and/or use protection until labs are.  • Differential diagnosis, natural history, supportive care, and indications for immediate follow-up discussed.          Please note that this dictation was created using voice recognition software. I have made a reasonable attempt to correct obvious errors, but I expect that there are errors of grammar and possibly content that I did not discover before finalizing the note.    This note was electronically signed by Mj PARNELL.

## 2021-04-26 ENCOUNTER — OFFICE VISIT (OUTPATIENT)
Dept: URGENT CARE | Facility: CLINIC | Age: 31
End: 2021-04-26
Payer: MEDICAID

## 2021-04-26 ENCOUNTER — HOSPITAL ENCOUNTER (OUTPATIENT)
Facility: MEDICAL CENTER | Age: 31
End: 2021-04-26
Attending: NURSE PRACTITIONER
Payer: MEDICAID

## 2021-04-26 VITALS
SYSTOLIC BLOOD PRESSURE: 108 MMHG | BODY MASS INDEX: 24.16 KG/M2 | WEIGHT: 145 LBS | HEIGHT: 65 IN | OXYGEN SATURATION: 99 % | RESPIRATION RATE: 12 BRPM | TEMPERATURE: 98.8 F | HEART RATE: 88 BPM | DIASTOLIC BLOOD PRESSURE: 76 MMHG

## 2021-04-26 DIAGNOSIS — N76.0 ACUTE VAGINITIS: ICD-10-CM

## 2021-04-26 DIAGNOSIS — N76.0 BV (BACTERIAL VAGINOSIS): ICD-10-CM

## 2021-04-26 DIAGNOSIS — N89.8 VAGINAL DISCHARGE: ICD-10-CM

## 2021-04-26 DIAGNOSIS — B96.89 BV (BACTERIAL VAGINOSIS): ICD-10-CM

## 2021-04-26 LAB
APPEARANCE UR: CLEAR
BILIRUB UR STRIP-MCNC: NEGATIVE MG/DL
COLOR UR AUTO: YELLOW
GLUCOSE UR STRIP.AUTO-MCNC: NEGATIVE MG/DL
KETONES UR STRIP.AUTO-MCNC: NEGATIVE MG/DL
LEUKOCYTE ESTERASE UR QL STRIP.AUTO: NEGATIVE
NITRITE UR QL STRIP.AUTO: NEGATIVE
PH UR STRIP.AUTO: 5.5 [PH] (ref 5–8)
PROT UR QL STRIP: NEGATIVE MG/DL
RBC UR QL AUTO: NEGATIVE
SP GR UR STRIP.AUTO: 1.01
UROBILINOGEN UR STRIP-MCNC: 0.2 MG/DL

## 2021-04-26 PROCEDURE — 81002 URINALYSIS NONAUTO W/O SCOPE: CPT | Performed by: NURSE PRACTITIONER

## 2021-04-26 PROCEDURE — 87491 CHLMYD TRACH DNA AMP PROBE: CPT

## 2021-04-26 PROCEDURE — 87510 GARDNER VAG DNA DIR PROBE: CPT

## 2021-04-26 PROCEDURE — 87591 N.GONORRHOEAE DNA AMP PROB: CPT

## 2021-04-26 PROCEDURE — 99213 OFFICE O/P EST LOW 20 MIN: CPT | Mod: 25 | Performed by: NURSE PRACTITIONER

## 2021-04-26 PROCEDURE — 87480 CANDIDA DNA DIR PROBE: CPT

## 2021-04-26 PROCEDURE — 87660 TRICHOMONAS VAGIN DIR PROBE: CPT

## 2021-04-26 RX ORDER — FLUCONAZOLE 150 MG/1
150 TABLET ORAL DAILY
Qty: 2 TABLET | Refills: 0 | Status: SHIPPED | OUTPATIENT
Start: 2021-04-26 | End: 2021-05-28

## 2021-04-26 RX ORDER — METRONIDAZOLE 500 MG/1
500 TABLET ORAL 2 TIMES DAILY
Qty: 14 TABLET | Refills: 0 | Status: SHIPPED | OUTPATIENT
Start: 2021-04-26 | End: 2021-05-03

## 2021-04-26 RX ORDER — THYROID 60 MG/1
60 TABLET ORAL DAILY
COMMUNITY
End: 2021-09-03

## 2021-04-26 ASSESSMENT — ENCOUNTER SYMPTOMS
EYE REDNESS: 0
DIZZINESS: 0
MYALGIAS: 0
SORE THROAT: 0
FEVER: 0
FLANK PAIN: 0
NAUSEA: 0
CHILLS: 0
SHORTNESS OF BREATH: 0
VOMITING: 0

## 2021-04-27 LAB
C TRACH DNA SPEC QL NAA+PROBE: NEGATIVE
CANDIDA DNA VAG QL PROBE+SIG AMP: NEGATIVE
G VAGINALIS DNA VAG QL PROBE+SIG AMP: POSITIVE
N GONORRHOEA DNA SPEC QL NAA+PROBE: NEGATIVE
SPECIMEN SOURCE: NORMAL
T VAGINALIS DNA VAG QL PROBE+SIG AMP: NEGATIVE

## 2021-04-27 NOTE — PROGRESS NOTES
Subjective:   Farideh Cunha is a 30 y.o. female who presents for UTI and Vaginal Discharge (x 1 week still not better )      HPI  Patient is a 30-year-old female who returns to clinic for reevaluation of concerns of continual symptoms of bacterial vaginosis and a yeast infection that she was diagnosed by myself on 4/19.  Patient has finished a full prescription of Diflucan and Flagyl had improvement of symptoms up to today when she noted a thick white discharge with some vaginal itching and foul smell.  She denies any dysuria, urinary frequency, fever, flank pain.  Patient denies any potential STD exposure as she was negative at her initial visit for any STDs.  Review of Systems   Constitutional: Negative for chills and fever.   HENT: Negative for sore throat.    Eyes: Negative for redness.   Respiratory: Negative for shortness of breath.    Cardiovascular: Negative for chest pain.   Gastrointestinal: Negative for nausea and vomiting.   Genitourinary: Negative for dysuria, flank pain, frequency, hematuria and urgency.        Vaginal itching, vaginal discharge   Musculoskeletal: Negative for myalgias.   Skin: Negative for rash.   Neurological: Negative for dizziness.       Medications:    • amitriptyline Tabs  • citalopram  • colestipol Tabs  • drospirenone-ethinyl estradiol  • estradiol  • fluconazole  • fluticasone  • hydrOXYzine HCl Tabs  • metroNIDAZOLE Tabs  • nitrofurantoin Caps  • omeprazole Cpdr  • thyroid Tabs  • Uribel Caps    Allergies: Bactrim [sulfamethoxazole w-trimethoprim], Ceftin [cefuroxime], and Ciprofloxacin    Problem List: Farideh Cunha has Hashimoto's disease; Menorrhagia with irregular cycle; GERD (gastroesophageal reflux disease); Obesity (BMI 30-39.9); Chronic midline low back pain with right-sided sciatica; Anxiety; Severe episode of recurrent major depressive disorder, without psychotic features (HCC); Severe anxiety with panic; Encounter for initial prescription of contraceptive  "pills; Disruption of family by separation and divorce; and Adjustment disorder with mixed anxiety and depressed mood on their problem list.    Surgical History:  Past Surgical History:   Procedure Laterality Date   • CHOLECYSTECTOMY         Past Social Hx: Farideh Cunha  reports that she has never smoked. She has never used smokeless tobacco. She reports that she does not drink alcohol and does not use drugs.     Past Family Hx:  Farideh Cunha family history includes Alcohol abuse in her mother; Anxiety disorder in her mother; Depression in her father; Diabetes in her maternal grandfather, paternal grandfather, and paternal grandmother.     Problem list, medications, and allergies reviewed by myself today in Epic.     Objective:     /76   Pulse 88   Temp 37.1 °C (98.8 °F)   Resp 12   Ht 1.651 m (5' 5\")   Wt 65.8 kg (145 lb)   LMP 04/12/2021   SpO2 99%   BMI 24.13 kg/m²     Physical Exam  Constitutional:       Appearance: Normal appearance. She is not ill-appearing or toxic-appearing.   HENT:      Head: Normocephalic.      Right Ear: External ear normal.      Left Ear: External ear normal.      Nose: Nose normal.      Mouth/Throat:      Lips: Pink.      Mouth: Mucous membranes are moist.   Eyes:      General: Lids are normal.         Right eye: No discharge.         Left eye: No discharge.   Pulmonary:      Effort: Pulmonary effort is normal. No accessory muscle usage or respiratory distress.   Genitourinary:     Comments: Deferred exam  Musculoskeletal:         General: Normal range of motion.      Cervical back: Full passive range of motion without pain.   Skin:     Coloration: Skin is not pale.   Neurological:      Mental Status: She is alert and oriented to person, place, and time.   Psychiatric:         Mood and Affect: Mood normal.         Thought Content: Thought content normal.         Assessment/Plan:     Diagnosis and associated orders:     1. Acute vaginitis  POCT Urinalysis    " CHLAMYDIA/GC PCR URINE OR SWAB    VAGINAL PATHOGENS DNA PANEL    fluconazole (DIFLUCAN) 150 MG tablet   2. Vaginal discharge  POCT Urinalysis    CHLAMYDIA/GC PCR URINE OR SWAB    VAGINAL PATHOGENS DNA PANEL    metroNIDAZOLE (FLAGYL) 500 MG Tab   3. BV (bacterial vaginosis)  metroNIDAZOLE (FLAGYL) 500 MG Tab      Comments/MDM:     • Patient is a 30-year-old female who return to clinic for the stated above, patient was positive for bacterial vaginosis and Candida infection 4/19 treated with full course of Flagyl and Diflucan.  Evidently patient has had reoccurring symptoms starting today, urinalysis was negative for any infectious etiology.  Discussed differentials with patient.  Patient would like to be restarted on oral Flagyl this evening as she suspects it is the bacterial vaginosis.  We will follow-up with pending lab results and change treatment plan as indicated.  • Differential diagnosis, natural history, supportive care, and indications for immediate follow-up discussed.            Please note that this dictation was created using voice recognition software. I have made a reasonable attempt to correct obvious errors, but I expect that there are errors of grammar and possibly content that I did not discover before finalizing the note.    This note was electronically signed by Mj PARNELL.

## 2021-05-28 ENCOUNTER — OFFICE VISIT (OUTPATIENT)
Dept: URGENT CARE | Facility: CLINIC | Age: 31
End: 2021-05-28
Payer: MEDICAID

## 2021-05-28 VITALS
WEIGHT: 140 LBS | OXYGEN SATURATION: 100 % | BODY MASS INDEX: 23.32 KG/M2 | RESPIRATION RATE: 16 BRPM | DIASTOLIC BLOOD PRESSURE: 70 MMHG | TEMPERATURE: 98.4 F | HEART RATE: 104 BPM | HEIGHT: 65 IN | SYSTOLIC BLOOD PRESSURE: 120 MMHG

## 2021-05-28 DIAGNOSIS — J02.0 PHARYNGITIS DUE TO STREPTOCOCCUS SPECIES: ICD-10-CM

## 2021-05-28 LAB
INT CON NEG: NEGATIVE
INT CON POS: POSITIVE
S PYO AG THROAT QL: POSITIVE

## 2021-05-28 PROCEDURE — 99213 OFFICE O/P EST LOW 20 MIN: CPT | Performed by: PHYSICIAN ASSISTANT

## 2021-05-28 PROCEDURE — 87880 STREP A ASSAY W/OPTIC: CPT | Performed by: PHYSICIAN ASSISTANT

## 2021-05-28 RX ORDER — AMOXICILLIN 500 MG/1
500 CAPSULE ORAL 2 TIMES DAILY
Qty: 20 CAPSULE | Refills: 0 | Status: SHIPPED | OUTPATIENT
Start: 2021-05-28 | End: 2021-06-07

## 2021-05-28 RX ORDER — AMITRIPTYLINE HYDROCHLORIDE 25 MG/1
TABLET, FILM COATED ORAL
COMMUNITY
Start: 2021-02-13 | End: 2021-09-03 | Stop reason: SDUPTHER

## 2021-05-28 RX ORDER — FLUTICASONE PROPIONATE 50 MCG
1 SPRAY, SUSPENSION (ML) NASAL DAILY
COMMUNITY
Start: 2021-03-04 | End: 2021-09-03 | Stop reason: SDUPTHER

## 2021-05-28 ASSESSMENT — ENCOUNTER SYMPTOMS
ABDOMINAL PAIN: 0
DIARRHEA: 0
HEADACHES: 0
NAUSEA: 0
COUGH: 0
PALPITATIONS: 0
DIZZINESS: 0
VOMITING: 0
CHILLS: 0
SINUS PAIN: 0
FEVER: 0
EYE PAIN: 0
MYALGIAS: 0
SORE THROAT: 0
SHORTNESS OF BREATH: 0
BLURRED VISION: 0

## 2021-05-28 NOTE — PROGRESS NOTES
Subjective:      Farideh Cunha is a 30 y.o. female who presents with Pharyngitis (white spots in throat as well, also stated she has new parter would like std testing)    HPI:  This is a new problem. Farideh Cunha is a 30 y.o. female who presents today for evaluation of possible throat infection.  Patient reports that she had oral sex a few days ago and then she looked in her throat this morning got concerned because she noticed that her tonsils were swollen with white spots.  She has noted some tender cervical lymphadenopathy and some mild fatigue but no true sore throat.  She is not taking any medications for symptoms.  No fever/chills.  No sick contacts that she is aware of.      Review of Systems   Constitutional: Positive for malaise/fatigue. Negative for chills and fever.   HENT: Negative for congestion, ear pain, sinus pain and sore throat.         Swollen tonsils with white spots   Eyes: Negative for blurred vision and pain.   Respiratory: Negative for cough and shortness of breath.    Cardiovascular: Negative for chest pain and palpitations.   Gastrointestinal: Negative for abdominal pain, diarrhea, nausea and vomiting.   Musculoskeletal: Negative for myalgias.   Skin: Negative for rash.   Neurological: Negative for dizziness and headaches.       PMH:  has a past medical history of Anxiety, Chronic pain, Depression, GERD (gastroesophageal reflux disease), Panic disorder, Thyroid disease, and Urinary tract infection.  MEDS:   Current Outpatient Medications:   •  amoxicillin (AMOXIL) 500 MG Cap, Take 1 capsule by mouth 2 times a day for 10 days., Disp: 20 capsule, Rfl: 0  •  thyroid (ARMOUR THYROID) 60 MG Tab, Take 60 mg by mouth every day., Disp: , Rfl:   •  drospirenone-ethinyl estradiol (RICHARD 28) 3-0.03 MG per tablet, Take 1 Tab by mouth every day., Disp: 84 Tab, Rfl: 3  •  hydrOXYzine HCl (ATARAX) 50 MG Tab, Take 2 Tabs by mouth 3 times a day as needed for Anxiety., Disp: 180 Tab, Rfl: 2  •   "estradiol (ESTRACE) 0.1 MG/GM vaginal cream, VIC PEA SIZED AMT VAGINALLY HS, Disp: , Rfl:   •  nitrofurantoin (MACROBID) 100 MG Cap, , Disp: , Rfl:   •  omeprazole (PRILOSEC) 10 MG CAPSULE DELAYED RELEASE, Take 1 Cap by mouth two times a day may change times on alt/days., Disp: 180 Cap, Rfl: 3  •  colestipol (COLESTID) 1 GM Tab, Take two tablets by mouth twice a day., Disp: 120 Tab, Rfl: 11  •  Meth-Hyo-M Bl-Na Phos-Ph Sal (URIBEL) 118 MG Cap, Take 1 Capsule by mouth. Take 1 capsule by mouth two times a day, Disp: , Rfl:   •  amitriptyline (ELAVIL) 10 MG Tab, Take 10 mg by mouth every day., Disp: , Rfl: 1  ALLERGIES:   Allergies   Allergen Reactions   • Bactrim [Sulfamethoxazole W-Trimethoprim] Swelling      swelling    • Ceftin [Cefuroxime] Swelling     Throat swelling   • Ciprofloxacin Swelling     SURGHX:   Past Surgical History:   Procedure Laterality Date   • CHOLECYSTECTOMY       SOCHX:  reports that she has never smoked. She has never used smokeless tobacco. She reports that she does not drink alcohol and does not use drugs.  FH: Family history was reviewed, no pertinent findings to report     Objective:     /70 (BP Location: Left arm, Patient Position: Sitting, BP Cuff Size: Adult long)   Pulse (!) 104   Temp 36.9 °C (98.4 °F) (Temporal)   Resp 16   Ht 1.651 m (5' 5\")   Wt 63.5 kg (140 lb)   SpO2 100%   BMI 23.30 kg/m²      Physical Exam  Constitutional:       Appearance: She is well-developed.   HENT:      Head: Normocephalic and atraumatic.      Right Ear: Tympanic membrane, ear canal and external ear normal.      Left Ear: Tympanic membrane, ear canal and external ear normal.      Nose: Nose normal.      Mouth/Throat:      Lips: Pink.      Mouth: Mucous membranes are moist.      Pharynx: Uvula midline. Posterior oropharyngeal erythema present. No uvula swelling.      Tonsils: Tonsillar exudate present. No tonsillar abscesses. 2+ on the right. 2+ on the left.   Eyes:      Conjunctiva/sclera: " Conjunctivae normal.      Pupils: Pupils are equal, round, and reactive to light.   Cardiovascular:      Rate and Rhythm: Normal rate and regular rhythm.      Heart sounds: Normal heart sounds. No murmur heard.     Pulmonary:      Effort: Pulmonary effort is normal.      Breath sounds: Normal breath sounds. No wheezing.   Musculoskeletal:      Cervical back: Normal range of motion.   Lymphadenopathy:      Cervical: Cervical adenopathy present.   Skin:     General: Skin is warm and dry.      Capillary Refill: Capillary refill takes less than 2 seconds.   Neurological:      Mental Status: She is alert and oriented to person, place, and time.   Psychiatric:         Behavior: Behavior normal.         Judgment: Judgment normal.          POCT Rapid Strep A - POSITIVE       Assessment/Plan:     1. Pharyngitis due to Streptococcus species  - POCT Rapid Strep A  - amoxicillin (AMOXIL) 500 MG Cap; Take 1 capsule by mouth 2 times a day for 10 days.  Dispense: 20 capsule; Refill: 0  Discussed with patient that it is unusual that she is not having a true sore throat but she has developed tender cervical lymphadenopathy with lateral 2+ exudative tonsillitis.  She is also very mildly tachycardic on exam.  Physical exam is highly consistent with strep pharyngitis and with the positive result we will initiate treatment with amoxicillin.  Patient was advised that she is contagious until she has been on the antibiotics for a full 24 hours.  I also recommend that she switch out her toothbrush after she has been on antibiotics for 2 to 3 days.          Differential Diagnosis, natural history, and supportive care discussed. Return to the Urgent Care or follow up with your PCP if symptoms fail to resolve, or for any new or worsening symptoms. Emergency room precautions discussed. Patient and/or family appears understanding of information.

## 2021-08-30 DIAGNOSIS — N92.1 MENORRHAGIA WITH IRREGULAR CYCLE: ICD-10-CM

## 2021-08-31 RX ORDER — DROSPIRENONE AND ETHINYL ESTRADIOL 0.03MG-3MG
KIT ORAL
Qty: 84 TABLET | Refills: 0 | Status: SHIPPED | OUTPATIENT
Start: 2021-08-31 | End: 2021-09-03 | Stop reason: SDUPTHER

## 2021-09-03 ENCOUNTER — OFFICE VISIT (OUTPATIENT)
Dept: MEDICAL GROUP | Facility: PHYSICIAN GROUP | Age: 31
End: 2021-09-03
Payer: COMMERCIAL

## 2021-09-03 VITALS
DIASTOLIC BLOOD PRESSURE: 70 MMHG | HEIGHT: 65 IN | SYSTOLIC BLOOD PRESSURE: 110 MMHG | RESPIRATION RATE: 16 BRPM | HEART RATE: 98 BPM | WEIGHT: 146.8 LBS | BODY MASS INDEX: 24.46 KG/M2 | OXYGEN SATURATION: 99 % | TEMPERATURE: 98 F

## 2021-09-03 DIAGNOSIS — R39.82 CHRONIC URINARY BLADDER PAIN: ICD-10-CM

## 2021-09-03 DIAGNOSIS — J30.89 ENVIRONMENTAL AND SEASONAL ALLERGIES: ICD-10-CM

## 2021-09-03 DIAGNOSIS — F41.1 GENERALIZED ANXIETY DISORDER WITH PANIC ATTACKS: ICD-10-CM

## 2021-09-03 DIAGNOSIS — F41.0 GENERALIZED ANXIETY DISORDER WITH PANIC ATTACKS: ICD-10-CM

## 2021-09-03 DIAGNOSIS — N92.1 MENORRHAGIA WITH IRREGULAR CYCLE: ICD-10-CM

## 2021-09-03 DIAGNOSIS — G47.09 OTHER INSOMNIA: ICD-10-CM

## 2021-09-03 DIAGNOSIS — F33.42 RECURRENT MAJOR DEPRESSIVE DISORDER, IN FULL REMISSION (HCC): ICD-10-CM

## 2021-09-03 DIAGNOSIS — K21.9 GASTROESOPHAGEAL REFLUX DISEASE WITHOUT ESOPHAGITIS: ICD-10-CM

## 2021-09-03 DIAGNOSIS — E06.3 HASHIMOTO'S DISEASE: ICD-10-CM

## 2021-09-03 DIAGNOSIS — N89.8 VAGINAL DRYNESS: ICD-10-CM

## 2021-09-03 PROCEDURE — 99214 OFFICE O/P EST MOD 30 MIN: CPT | Performed by: PHYSICIAN ASSISTANT

## 2021-09-03 RX ORDER — METHENAMINE, SODIUM PHOSPHATE, MONOBASIC, MONOHYDRATE, PHENYL SALICYLATE, METHYLENE BLUE, AND HYOSCYAMINE SULFATE 118; 40.8; 36; 10; .12 MG/1; MG/1; MG/1; MG/1; MG/1
1 CAPSULE ORAL
Qty: 120 CAPSULE | Status: CANCELLED | OUTPATIENT
Start: 2021-09-03

## 2021-09-03 RX ORDER — CEFDINIR 300 MG/1
CAPSULE ORAL
COMMUNITY
Start: 2021-09-01 | End: 2021-11-08

## 2021-09-03 RX ORDER — LEVOTHYROXINE, LIOTHYRONINE 57; 13.5 UG/1; UG/1
90 TABLET ORAL
COMMUNITY
Start: 2021-07-05 | End: 2021-09-03 | Stop reason: SDUPTHER

## 2021-09-03 RX ORDER — ESTRADIOL 0.1 MG/G
CREAM VAGINAL
Qty: 42.5 G | Refills: 3 | Status: SHIPPED | OUTPATIENT
Start: 2021-09-03 | End: 2024-02-16

## 2021-09-03 RX ORDER — NITROFURANTOIN 25; 75 MG/1; MG/1
CAPSULE ORAL
Status: CANCELLED | OUTPATIENT
Start: 2021-09-03

## 2021-09-03 RX ORDER — FLUTICASONE PROPIONATE 50 MCG
1 SPRAY, SUSPENSION (ML) NASAL DAILY
Qty: 16 G | Refills: 2 | Status: SHIPPED | OUTPATIENT
Start: 2021-09-03

## 2021-09-03 RX ORDER — OMEPRAZOLE 10 MG/1
10 CAPSULE, DELAYED RELEASE ORAL
Qty: 180 CAPSULE | Refills: 3 | Status: SHIPPED | OUTPATIENT
Start: 2021-09-03 | End: 2021-11-08 | Stop reason: SDUPTHER

## 2021-09-03 RX ORDER — PROMETHAZINE HYDROCHLORIDE 25 MG/1
25 TABLET ORAL
COMMUNITY
Start: 2021-08-30 | End: 2021-11-08

## 2021-09-03 RX ORDER — AMITRIPTYLINE HYDROCHLORIDE 25 MG/1
100 TABLET, FILM COATED ORAL NIGHTLY PRN
Qty: 360 TABLET | Refills: 3 | Status: SHIPPED | OUTPATIENT
Start: 2021-09-03 | End: 2021-11-08 | Stop reason: SDUPTHER

## 2021-09-03 RX ORDER — DROSPIRENONE AND ETHINYL ESTRADIOL 0.03MG-3MG
1 KIT ORAL
Qty: 84 TABLET | Refills: 3 | Status: SHIPPED | OUTPATIENT
Start: 2021-09-03 | End: 2021-11-08 | Stop reason: SDUPTHER

## 2021-09-03 RX ORDER — AMOXICILLIN 875 MG/1
875 TABLET, COATED ORAL DAILY
COMMUNITY
Start: 2021-08-30 | End: 2021-11-08

## 2021-09-03 RX ORDER — LEVOTHYROXINE, LIOTHYRONINE 57; 13.5 UG/1; UG/1
90 TABLET ORAL
Qty: 90 TABLET | Refills: 3 | Status: SHIPPED | OUTPATIENT
Start: 2021-09-03 | End: 2021-11-08 | Stop reason: SDUPTHER

## 2021-09-03 RX ORDER — AMITRIPTYLINE HYDROCHLORIDE 25 MG/1
TABLET, FILM COATED ORAL
Qty: 30 TABLET | Status: CANCELLED | OUTPATIENT
Start: 2021-09-03

## 2021-09-03 RX ORDER — HYDROXYZINE 50 MG/1
50 TABLET, FILM COATED ORAL
Qty: 90 TABLET | Refills: 3 | Status: SHIPPED | OUTPATIENT
Start: 2021-09-03 | End: 2021-11-08 | Stop reason: SDUPTHER

## 2021-09-03 NOTE — PROGRESS NOTES
Chief Complaint   Patient presents with   • UTI     Already On Antibiotics    • Medication Refill       HISTORY OF PRESENT ILLNESS: Farideh Cunha is an established 31 y.o. female here to discuss the evaluation and management of:    Hashimoto's disease  Chronic problem.  Unknown stable.  Lab work is past due.  Patient takes NP thyroid 90 mg once daily.  She denies side effects or complications from medication.  States she is asymptomatic.    Recurrent major depressive disorder, in full remission (HCC)  Chronic problem.  In remission.  Patient is not on an antidepressant medication.  States she is no longer following up with therapy.  She is developed healthy coping mechanisms for depression and anxiety.  Currently does not have a regular exercise routine.  She tells me her diet is healthy.  She denies homicidal or suicidal ideation.    Generalized anxiety disorder with panic attacks  Chronic but stable problem.  Patient takes hydroxyzine 50 mg tab once nightly.  Hydroxyzine helps treat generalized anxiety and insomnia.  This regimen works well for her.  Patient is requesting refills.  She denies side effects or complications medication.  Patient tells me that her diet is healthy but denies having regular exercise routine.    Menorrhagia with irregular cycle  Chronic but stable problem.  Patient takes oral contraceptive for heavy menstrual bleeding.  States last menstrual.  Was last Thursday/Friday and was 4-5 days duration.  Positive for moderate to heavy bleeding.  She experiences severe cramping but takes over-the-counter Midol.  Patient would like to continue oral contraceptive.  Patient denies being pregnant.    Other insomnia  See above.    Vaginal dryness  Chronic but stable problem.  Patient uses topical Estrace cream to alleviate vaginal dryness symptoms.  Denies side effects or complications medication.  Patient is requesting refill.    Gastroesophageal reflux disease without esophagitis  Chronic but  stable problem.  Patient takes Prilosec 10 mg once daily.  Denies side effects or complications medication.  Patient is requesting refill.  She denies chronic cough, hoarseness of voice, pain or difficulty with swallowing, nausea, vomiting, abdominal pain.    Chronic urinary bladder pain  Chronic problem.  Uncontrolled.  Patient follows up with urology but has not been seen by urology in over a year.  Advised patient to follow-up with urology.  She is requesting an amitriptyline refill.  Patient states amitriptyline helps alleviate bladder pain symptoms.  Medication is managed by her neurologist.  We will refill this 1 time and advised patient to follow-up with urology.  She denies side effects or complications medication.    Environmental and seasonal allergies  Chronic but stable problem.  Environmental seasonal allergies managed with Flonase.  Patient is requesting refill.        Patient Active Problem List    Diagnosis Date Noted   • Adjustment disorder with mixed anxiety and depressed mood 12/02/2020   • Disruption of family by separation and divorce 11/18/2020   • Severe episode of recurrent major depressive disorder, without psychotic features ([facility]) 10/26/2020   • Panic attacks 10/26/2020   • Encounter for initial prescription of contraceptive pills 10/26/2020   • Anxiety 04/22/2020   • Chronic midline low back pain with right-sided sciatica 11/01/2018   • Hashimoto's disease 09/13/2018   • Menorrhagia with irregular cycle 09/13/2018   • GERD (gastroesophageal reflux disease) 09/13/2018   • Obesity (BMI 30-39.9) 09/13/2018       Allergies:Bactrim [sulfamethoxazole w-trimethoprim] and Ciprofloxacin    Current Outpatient Medications   Medication Sig Dispense Refill   • promethazine (PHENERGAN) 25 MG Tab Take 25 mg by mouth.     • drospirenone-ethinyl estradiol (RICHARD) 3-0.03 MG per tablet Take 1 Tablet by mouth every day. 84 Tablet 3   • fluticasone (FLONASE) 50 MCG/ACT nasal spray Administer 1 Spray into  affected nostril(S) every day. 16 g 2   • hydrOXYzine HCl (ATARAX) 50 MG Tab Take 1 Tablet by mouth at bedtime. 90 Tablet 3   • estradiol (ESTRACE) 0.1 MG/GM vaginal cream VIC PEA SIZED AMT VAGINALLY HS 42.5 g 3   • omeprazole (PRILOSEC) 10 MG CAPSULE DELAYED RELEASE Take 1 Capsule by mouth two times a day may change times on alt/days. 180 Capsule 3   • NP THYROID 90 MG Tab Take 1 Tablet by mouth every day. 90 Tablet 3   • amitriptyline (ELAVIL) 25 MG Tab Take 4 Tablets by mouth at bedtime as needed for Mild Pain. 360 Tablet 3   • nitrofurantoin (MACROBID) 100 MG Cap      • Meth-Hyo-M Bl-Na Phos-Ph Sal (URIBEL) 118 MG Cap Take 1 Capsule by mouth. Take 1 capsule by mouth two times a day     • amoxicillin (AMOXIL) 875 MG tablet Take 875 mg by mouth every day. TAKE 1 TABLET BY MOUTH TWICE DAILY FOR 10 DAYS     • cefdinir (OMNICEF) 300 MG Cap TAKE ONE CAPSULE BY MOUTH TWICE DAILY FOR 7 DAYS UNTIL GONE     • colestipol (COLESTID) 1 GM Tab Take two tablets by mouth twice a day. (Patient not taking: Reported on 9/3/2021) 120 Tab 11     No current facility-administered medications for this visit.       Social History     Tobacco Use   • Smoking status: Never Smoker   • Smokeless tobacco: Never Used   Vaping Use   • Vaping Use: Every day   Substance Use Topics   • Alcohol use: No   • Drug use: No     Comment: CBD use for bladder pain       Family Status   Relation Name Status   • MGFa  (Not Specified)   • PGMo  (Not Specified)   • PGFa  (Not Specified)   • Mo  Alive   • Fa  Alive   • Neg Hx  (Not Specified)     Family History   Problem Relation Age of Onset   • Diabetes Maternal Grandfather    • Diabetes Paternal Grandmother    • Diabetes Paternal Grandfather    • Anxiety disorder Mother    • Alcohol abuse Mother    • Depression Father    • Cancer Neg Hx    • Heart Disease Neg Hx    • Stroke Neg Hx    • Alcohol/Drug Neg Hx        ROS:  Review of Systems   Constitutional: Negative for fever, chills, weight loss and  "malaise/fatigue.   HENT: Negative for ear pain, nosebleeds, congestion, sore throat and neck pain.    Eyes: Negative for blurred vision.   Respiratory: Negative for cough, sputum production, shortness of breath and wheezing.    Cardiovascular: Negative for chest pain, palpitations, orthopnea and leg swelling.   Gastrointestinal: Negative for heartburn, nausea, vomiting and abdominal pain.   Genitourinary: Negative for dysuria, urgency and frequency.   Musculoskeletal: Negative for myalgias, back pain and joint pain.   Skin: Negative for rash and itching.   Neurological: Negative for dizziness, tingling, tremors, sensory change, focal weakness and headaches.   Endo/Heme/Allergies: Does not bruise/bleed easily.   Psychiatric/Behavioral: Negative for depression, suicidal ideas and memory loss.    All other systems reviewed and are negative except as in HPI.    Exam: /70 (BP Location: Left arm, Patient Position: Sitting, BP Cuff Size: Adult)   Pulse 98   Temp 36.7 °C (98 °F) (Temporal)   Resp 16   Ht 1.651 m (5' 5\")   Wt 66.6 kg (146 lb 12.8 oz)   SpO2 99%  Body mass index is 24.43 kg/m².  General: Normal appearing. No distress.  HEENT: Normocephalic. Eyes conjunctiva clear lids without ptosis, pupils equal and reactive to light accommodation, ears normal shape and contour, canals are clear bilaterally, tympanic membranes are benign, nasal mucosa benign, oropharynx is without erythema, edema or exudates.   Neck: Supple without JVD. Thyroid is not enlarged.  Pulmonary: Clear to ausculation.  Normal effort. No rales, ronchi, or wheezing.  Cardiovascular: Regular rate and rhythm without murmur.   Abdomen: Nondistended.   Neurologic: Grossly nonfocal.  Cranial nerves are normal.  Lymph: No cervical, supraclavicular or axillary lymph nodes are palpable  Skin: Warm and dry.  No rashes or suspicious skin lesions.  Musculoskeletal: Normal gait. No extremity cyanosis, clubbing, or edema.  Psych: Normal mood and " affect. Alert and oriented x3. Judgment and insight is normal.    Medical decision-making and discussion:  1. Hashimoto's disease  Continue thyroid medication. Instructed patient to take on empty stomach with glass of water, 30 minutes prior to food or other medications. Labs as indicated.    - NP THYROID 90 MG Tab; Take 1 Tablet by mouth every day.  Dispense: 90 Tablet; Refill: 3  - TSH WITH REFLEX TO FT4; Future    2. Recurrent major depressive disorder, in full remission (HCC)  Chronic problem.  Stable.  Patient does not take medication(s) for depression.  Advised patient to continue working on diet and exercise.  Continue practicing good sleep hygiene, positive talk, and developing healthy coping mechanisms for stress anxiety.    Denies any suicidal or homicidal ideation. Discussed that should the patient have any symptoms they should call suicide prevention hotline or report to the emergency room immediately.      3. Generalized anxiety disorder with panic attacks  Patient is feeling well on current medications. Will continue. Denies any suicidal or homicidal ideation. Emphasized importance of healthy diet and exercise. Discussed that should the patient have any symptoms they should call suicide prevention hotline or report to the emergency room immediately.    - hydrOXYzine HCl (ATARAX) 50 MG Tab; Take 1 Tablet by mouth at bedtime.  Dispense: 90 Tablet; Refill: 3    4. Menorrhagia with irregular cycle  Discussed OCPs at length. Instructed on importance of taking pill at the same time every day. Use back-up for up to 2 weeks. Discussed risks associated with OCPs including: blood clot, heart attack, and stroke. Advised not to smoke while on hormonal birth control.    - drospirenone-ethinyl estradiol (RICHARD) 3-0.03 MG per tablet; Take 1 Tablet by mouth every day.  Dispense: 84 Tablet; Refill: 3    5. Other insomnia  Chronic but stable problem.  Continue working on diet, exercise, sleep hygiene, hydration, and  developing healthy coping mechanisms for stress anxiety.  Continue monitor.  - hydrOXYzine HCl (ATARAX) 50 MG Tab; Take 1 Tablet by mouth at bedtime.  Dispense: 90 Tablet; Refill: 3    6. Vaginal dryness  Chronic but stable problem.  Continue topical Estrace cream as needed.  Patient uses medication 2-3 times per week.  Advised patient to not use medication nightly.  Continue to monitor.  - estradiol (ESTRACE) 0.1 MG/GM vaginal cream; VIC PEA SIZED AMT VAGINALLY HS  Dispense: 42.5 g; Refill: 3    7. Gastroesophageal reflux disease without esophagitis  This is a chronic and stable problem. Patient is doing well. No red flags. Continue PPI and monitor.    Ways to help your acid reflux:    · Eat smaller portions.  · Avoid lying down after meals.  · Normalize body weight.  · Avoid common reflux triggers such as spicy, greasy foods, peppers, onions.  · Avoid caffeine, carbonation, alcohol, tobacco.  · Raise the head of your bed.    - omeprazole (PRILOSEC) 10 MG CAPSULE DELAYED RELEASE; Take 1 Capsule by mouth two times a day may change times on alt/days.  Dispense: 180 Capsule; Refill: 3    8. Chronic urinary bladder pain  Chronic problem.  Could be better controlled.  Refilled amitriptyline.  Advised patient avoid known triggers.  Avoid bladder irritants.  Advised patient to make an appoint with urologist for further evaluation and for medication management.  - amitriptyline (ELAVIL) 25 MG Tab; Take 4 Tablets by mouth at bedtime as needed for Mild Pain.  Dispense: 360 Tablet; Refill: 3    9. Environmental and seasonal allergies  Advised nasal saline and steroid sprays daily. OTC anti-histamines (Zyrtec) as needed. Encouraged allergen avoidence and environment modification when possible. If regular use not effective, may consider referral to allergist for immunotherapy.    - fluticasone (FLONASE) 50 MCG/ACT nasal spray; Administer 1 Spray into affected nostril(S) every day.  Dispense: 16 g; Refill: 2      Please note  that this dictation was created using voice recognition software. I have made every reasonable attempt to correct obvious errors, but I expect that there are errors of grammar and possibly content that I did not discover before finalizing the note.    Assessment/Plan:  1. Hashimoto's disease  NP THYROID 90 MG Tab    TSH WITH REFLEX TO FT4   2. Recurrent major depressive disorder, in full remission (HCC)     3. Generalized anxiety disorder with panic attacks  hydrOXYzine HCl (ATARAX) 50 MG Tab   4. Menorrhagia with irregular cycle  drospirenone-ethinyl estradiol (RICHARD) 3-0.03 MG per tablet   5. Other insomnia  hydrOXYzine HCl (ATARAX) 50 MG Tab   6. Vaginal dryness  estradiol (ESTRACE) 0.1 MG/GM vaginal cream   7. Gastroesophageal reflux disease without esophagitis  omeprazole (PRILOSEC) 10 MG CAPSULE DELAYED RELEASE   8. Chronic urinary bladder pain  amitriptyline (ELAVIL) 25 MG Tab   9. Environmental and seasonal allergies  fluticasone (FLONASE) 50 MCG/ACT nasal spray       Return in about 6 months (around 3/3/2022), or if symptoms worsen or fail to improve.

## 2021-09-10 ENCOUNTER — HOSPITAL ENCOUNTER (OUTPATIENT)
Facility: MEDICAL CENTER | Age: 31
End: 2021-09-10
Attending: UROLOGY
Payer: COMMERCIAL

## 2021-09-10 PROCEDURE — 87086 URINE CULTURE/COLONY COUNT: CPT

## 2021-09-12 LAB
BACTERIA UR CULT: NORMAL
SIGNIFICANT IND 70042: NORMAL
SITE SITE: NORMAL
SOURCE SOURCE: NORMAL

## 2021-11-08 ENCOUNTER — OFFICE VISIT (OUTPATIENT)
Dept: MEDICAL GROUP | Facility: IMAGING CENTER | Age: 31
End: 2021-11-08
Payer: COMMERCIAL

## 2021-11-08 VITALS
OXYGEN SATURATION: 97 % | HEART RATE: 95 BPM | DIASTOLIC BLOOD PRESSURE: 64 MMHG | TEMPERATURE: 98.6 F | SYSTOLIC BLOOD PRESSURE: 112 MMHG | HEIGHT: 65 IN | WEIGHT: 144 LBS | BODY MASS INDEX: 23.99 KG/M2

## 2021-11-08 DIAGNOSIS — R09.81 NASAL CONGESTION: ICD-10-CM

## 2021-11-08 DIAGNOSIS — Z78.9 USES BIRTH CONTROL: ICD-10-CM

## 2021-11-08 DIAGNOSIS — F41.1 GENERALIZED ANXIETY DISORDER WITH PANIC ATTACKS: ICD-10-CM

## 2021-11-08 DIAGNOSIS — K21.9 GASTROESOPHAGEAL REFLUX DISEASE WITHOUT ESOPHAGITIS: ICD-10-CM

## 2021-11-08 DIAGNOSIS — Z76.0 MEDICATION REFILL: ICD-10-CM

## 2021-11-08 DIAGNOSIS — R05.9 COUGH: ICD-10-CM

## 2021-11-08 DIAGNOSIS — R53.83 OTHER FATIGUE: ICD-10-CM

## 2021-11-08 DIAGNOSIS — F41.0 GENERALIZED ANXIETY DISORDER WITH PANIC ATTACKS: ICD-10-CM

## 2021-11-08 DIAGNOSIS — E06.3 HASHIMOTO'S DISEASE: ICD-10-CM

## 2021-11-08 DIAGNOSIS — R11.0 NAUSEA: ICD-10-CM

## 2021-11-08 DIAGNOSIS — R52 BODY ACHES: ICD-10-CM

## 2021-11-08 DIAGNOSIS — G47.09 OTHER INSOMNIA: ICD-10-CM

## 2021-11-08 DIAGNOSIS — R39.82 CHRONIC URINARY BLADDER PAIN: ICD-10-CM

## 2021-11-08 LAB
FLUAV+FLUBV AG SPEC QL IA: NEGATIVE
INT CON NEG: NORMAL
INT CON POS: NORMAL

## 2021-11-08 PROCEDURE — 87804 INFLUENZA ASSAY W/OPTIC: CPT | Performed by: PHYSICIAN ASSISTANT

## 2021-11-08 PROCEDURE — 99214 OFFICE O/P EST MOD 30 MIN: CPT | Performed by: PHYSICIAN ASSISTANT

## 2021-11-08 RX ORDER — AMITRIPTYLINE HYDROCHLORIDE 25 MG/1
100 TABLET, FILM COATED ORAL NIGHTLY PRN
Qty: 360 TABLET | Refills: 3 | Status: SHIPPED
Start: 2021-11-08 | End: 2022-06-13

## 2021-11-08 RX ORDER — HYDROXYZINE 50 MG/1
50 TABLET, FILM COATED ORAL
Qty: 90 TABLET | Refills: 3 | Status: SHIPPED | OUTPATIENT
Start: 2021-11-08 | End: 2022-06-03 | Stop reason: SDUPTHER

## 2021-11-08 RX ORDER — OMEPRAZOLE 10 MG/1
10 CAPSULE, DELAYED RELEASE ORAL
Qty: 180 CAPSULE | Refills: 3 | Status: SHIPPED | OUTPATIENT
Start: 2021-11-08

## 2021-11-08 RX ORDER — LEVOTHYROXINE, LIOTHYRONINE 57; 13.5 UG/1; UG/1
90 TABLET ORAL
Qty: 90 TABLET | Refills: 3 | Status: SHIPPED | OUTPATIENT
Start: 2021-11-08 | End: 2022-06-03 | Stop reason: SDUPTHER

## 2021-11-08 RX ORDER — IBUPROFEN 200 MG
600 TABLET ORAL PRN
COMMUNITY
Start: 2021-08-29 | End: 2024-02-16

## 2021-11-08 RX ORDER — ONDANSETRON 4 MG/1
4 TABLET, FILM COATED ORAL EVERY 8 HOURS PRN
Qty: 20 TABLET | Refills: 0 | Status: SHIPPED | OUTPATIENT
Start: 2021-11-08 | End: 2021-11-15

## 2021-11-08 RX ORDER — DROSPIRENONE AND ETHINYL ESTRADIOL 0.03MG-3MG
1 KIT ORAL
Qty: 84 TABLET | Refills: 3 | Status: SHIPPED | OUTPATIENT
Start: 2021-11-08 | End: 2022-06-03 | Stop reason: SDUPTHER

## 2021-11-08 ASSESSMENT — PAIN SCALES - GENERAL: PAINLEVEL: 7=MODERATE-SEVERE PAIN

## 2021-11-08 NOTE — LETTER
[facility] [place] [id number] [place] [address]     November 8, 2021    Patient: Farideh Cunha   YOB: 1990   Date of Visit: 11/8/2021       To Whom It May Concern:      Farideh Cunha was seen and treated in our department on 11/8/2021. She is to stay home from work until 11/14/21 or sooner if medically cleared.        Sincerely,           Elizabeth Box P.A.-C.

## 2021-11-08 NOTE — ASSESSMENT & PLAN NOTE
Patient admits to upper respiratory symptoms for the past week.  Her symptoms started with a sore throat last Tuesday.  It is started to get progressively worse.  She developed nasal congestion and a cough.  She has tried Sudafed without relief.  No nasal sprays.  She is having nausea and diarrhea.  Diarrhea has been worse the past 3 days.  She admits to about 3 episodes of watery diarrhea a day.  She has been COVID vaccinated with "[employer]  She did get a Covid test at work today as she started to feel very weak and it was negative.  [facility]  No temperature over 100.3.  She occasionally vapes.

## 2021-11-08 NOTE — PROGRESS NOTES
Subjective:     CC:   Chief Complaint   Patient presents with   • Establish Care     dry cough, dizzy, achy, drainage, sneezing, nausea   • Nasal Congestion     sx started last tuesday   • Pharyngitis   • Body Aches       HPI:   Farideh presents today to discuss:    Cough  Patient admits to upper respiratory symptoms for the past week.  Her symptoms started with a sore throat last Tuesday.  It is started to get progressively worse.  She developed nasal congestion and a cough.  She has tried Sudafed without relief.  No nasal sprays.  She is having nausea and diarrhea.  Diarrhea has been worse the past 3 days.  She admits to about 3 episodes of watery diarrhea a day.  She has been COVID vaccinated with [employer].  She did get a Covid test at work today as she started to feel very weak and it was negative.  She has an appointment scheduled on Wednesday at the "[facility]".  No temperature over 100.3.  She occasionally vapes.      Patient's last menstrual period was 10/20/2021 (exact date).    She is also requesting refills today of her chronic medications.    Past Medical History:   Diagnosis Date   • Anxiety    • Chronic pain    • Depression    • GERD (gastroesophageal reflux disease)    • Panic disorder    • Thyroid disease    • Urinary tract infection      Social History     Tobacco Use   • Smoking status: Never Smoker   • Smokeless tobacco: Never Used   Vaping Use   • Vaping Use: Every day   Substance Use Topics   • Alcohol use: No   • Drug use: No     Comment: CBD use for bladder pain     Current Outpatient Medications Ordered in Epic   Medication Sig Dispense Refill   • ondansetron (ZOFRAN) 4 MG Tab tablet Take 1 Tablet by mouth every 8 hours as needed for Nausea/Vomiting for up to 7 days. 20 Tablet 0   • NP THYROID 90 MG Tab Take 1 Tablet by mouth every day. 90 Tablet 3   • drospirenone-ethinyl estradiol (RICHARD) 3-0.03 MG per tablet Take 1 Tablet by mouth every day. 84 Tablet 3   • omeprazole  "(PRILOSEC) 10 MG CAPSULE DELAYED RELEASE Take 1 Capsule by mouth two times a day may change times on alt/days. 180 Capsule 3   • hydrOXYzine HCl (ATARAX) 50 MG Tab Take 1 Tablet by mouth at bedtime. 90 Tablet 3   • amitriptyline (ELAVIL) 25 MG Tab Take 4 Tablets by mouth at bedtime as needed for Mild Pain. 360 Tablet 3   • fluticasone (FLONASE) 50 MCG/ACT nasal spray Administer 1 Spray into affected nostril(S) every day. 16 g 2   • estradiol (ESTRACE) 0.1 MG/GM vaginal cream VIC PEA SIZED AMT VAGINALLY HS 42.5 g 3   • colestipol (COLESTID) 1 GM Tab Take two tablets by mouth twice a day. 120 Tab 11   • Meth-Hyo-M Bl-Na Phos-Ph Sal (URIBEL) 118 MG Cap Take 1 Capsule by mouth. Take 1 capsule by mouth two times a day     • ibuprofen (MOTRIN) 200 MG Tab        No current Epic-ordered facility-administered medications on file.     Bactrim [sulfamethoxazole w-trimethoprim]    ROS: See HPI  Gen: no fevers/chills  Pulm: no sob, + cough  CV: no chest pain, no palpitations, no edema  GI: See HPI  Skin: no rash, no lesions  Neuro: no headaches, no numbness/tingling  Heme/Lymph: no easy bruising or bleeding    Objective:   Exam:  /64 (BP Location: Left arm, Patient Position: Sitting, BP Cuff Size: Adult)   Pulse 95   Temp 37 °C (98.6 °F) (Temporal)   Ht 1.651 m (5' 5\")   Wt 65.3 kg (144 lb)   LMP 10/20/2021 (Exact Date)   SpO2 97%   BMI 23.96 kg/m²    Body mass index is 23.96 kg/m².    I was donned in proper PPE including face shield, N95, gloves, gown during entire visit.  Patient wore their facemask during the entirety of the visit.    Gen: Alert and oriented, No apparent distress.  HEENT: Head atraumatic, normocephalic. Pupils equal and round.  Bilateral TMs with serous effusion.  Neck: Neck is supple without lymphadenopathy.   Lungs: Normal effort, CTA bilaterally, no wheezes, rhonchi, or rales  CV: Regular rate and rhythm. No murmurs, rubs, or gallops.  ABD: + Hyperactive BS. Non-tender, non-distended. No " rebound, rigidity, or guarding.  Ext: No clubbing, cyanosis, edema.    Assessment & Plan:     31 y.o. female with the following -     1. Cough  Patient with cough and URI symptoms.  Flu negative.  Await repeat Covid swab.  In the meantime increase fluids and rest.  Work note supplied to patient.  - POCT Influenza A/B    2. Nasal congestion  Add Flonase over-the-counter.  - POCT Influenza A/B    3. Body aches  Tylenol and NSAIDs as needed.  - POCT Influenza A/B    4. Other fatigue  If persists will order labs.    5. Nausea  - ondansetron (ZOFRAN) 4 MG Tab tablet; Take 1 Tablet by mouth every 8 hours as needed for Nausea/Vomiting for up to 7 days.  Dispense: 20 Tablet; Refill: 0    6. Hashimoto's disease  - NP THYROID 90 MG Tab; Take 1 Tablet by mouth every day.  Dispense: 90 Tablet; Refill: 3    7. Uses birth control  - drospirenone-ethinyl estradiol (RICHARD) 3-0.03 MG per tablet; Take 1 Tablet by mouth every day.  Dispense: 84 Tablet; Refill: 3    8. Gastroesophageal reflux disease without esophagitis  - omeprazole (PRILOSEC) 10 MG CAPSULE DELAYED RELEASE; Take 1 Capsule by mouth two times a day may change times on alt/days.  Dispense: 180 Capsule; Refill: 3    9. Other insomnia  - hydrOXYzine HCl (ATARAX) 50 MG Tab; Take 1 Tablet by mouth at bedtime.  Dispense: 90 Tablet; Refill: 3    10. Generalized anxiety disorder with panic attacks  - hydrOXYzine HCl (ATARAX) 50 MG Tab; Take 1 Tablet by mouth at bedtime.  Dispense: 90 Tablet; Refill: 3    11. Chronic urinary bladder pain  - amitriptyline (ELAVIL) 25 MG Tab; Take 4 Tablets by mouth at bedtime as needed for Mild Pain.  Dispense: 360 Tablet; Refill: 3    12. Medication refill    Return in about 3 weeks (around 11/29/2021) for Annual physical/Pap Smear.    Elizabeth Box PA-C ([place])  Physician Assistant Certified  [facility]    Please note that this dictation was created using voice recognition software. I have made every reasonable attempt to correct  obvious errors, but I expect that there are errors of grammar and possibly content that I did not discover before finalizing the note.

## 2021-11-08 NOTE — PATIENT INSTRUCTIONS
My recommendations for an upper respiratory infection:  - Use a humidifier, especially at night. Cold or warm water humidifiers have the same effect.  - Kamron Med squeeze bottle sinus rinses or plain nasal saline twice a day.  - Hot tea + honey + fresh lemon juice  - Honey by itself has been shown to help provide cough relief  - Frequent hand washing, rest, hydration  - OTC meds as recommended today during your visit

## 2021-11-09 DIAGNOSIS — J06.9 UPPER RESPIRATORY TRACT INFECTION, UNSPECIFIED TYPE: ICD-10-CM

## 2021-11-09 RX ORDER — AZITHROMYCIN 250 MG/1
TABLET, FILM COATED ORAL
Qty: 6 TABLET | Refills: 0 | Status: SHIPPED
Start: 2021-11-09 | End: 2021-11-22

## 2021-11-22 ENCOUNTER — HOSPITAL ENCOUNTER (OUTPATIENT)
Facility: MEDICAL CENTER | Age: 31
End: 2021-11-22
Attending: PHYSICIAN ASSISTANT
Payer: COMMERCIAL

## 2021-11-22 ENCOUNTER — OFFICE VISIT (OUTPATIENT)
Dept: MEDICAL GROUP | Facility: IMAGING CENTER | Age: 31
End: 2021-11-22
Payer: MEDICAID

## 2021-11-22 VITALS
DIASTOLIC BLOOD PRESSURE: 68 MMHG | SYSTOLIC BLOOD PRESSURE: 118 MMHG | WEIGHT: 146 LBS | HEIGHT: 65 IN | OXYGEN SATURATION: 98 % | TEMPERATURE: 98.9 F | HEART RATE: 89 BPM | BODY MASS INDEX: 24.32 KG/M2

## 2021-11-22 DIAGNOSIS — Z11.3 SCREENING EXAMINATION FOR SEXUALLY TRANSMITTED DISEASE: ICD-10-CM

## 2021-11-22 DIAGNOSIS — R30.0 DYSURIA: ICD-10-CM

## 2021-11-22 DIAGNOSIS — N89.8 VAGINAL DISCHARGE: ICD-10-CM

## 2021-11-22 LAB
APPEARANCE UR: CLEAR
BILIRUB UR STRIP-MCNC: NEGATIVE MG/DL
COLOR UR AUTO: YELLOW
GLUCOSE UR STRIP.AUTO-MCNC: NEGATIVE MG/DL
KETONES UR STRIP.AUTO-MCNC: NEGATIVE MG/DL
LEUKOCYTE ESTERASE UR QL STRIP.AUTO: NEGATIVE
NITRITE UR QL STRIP.AUTO: NEGATIVE
PH UR STRIP.AUTO: 6.5 [PH] (ref 5–8)
PROT UR QL STRIP: NEGATIVE MG/DL
RBC UR QL AUTO: NEGATIVE
SP GR UR STRIP.AUTO: 1.02
UROBILINOGEN UR STRIP-MCNC: 0.2 MG/DL

## 2021-11-22 PROCEDURE — 87480 CANDIDA DNA DIR PROBE: CPT

## 2021-11-22 PROCEDURE — 87591 N.GONORRHOEAE DNA AMP PROB: CPT

## 2021-11-22 PROCEDURE — 87660 TRICHOMONAS VAGIN DIR PROBE: CPT

## 2021-11-22 PROCEDURE — 99213 OFFICE O/P EST LOW 20 MIN: CPT | Performed by: PHYSICIAN ASSISTANT

## 2021-11-22 PROCEDURE — 87510 GARDNER VAG DNA DIR PROBE: CPT

## 2021-11-22 PROCEDURE — 87491 CHLMYD TRACH DNA AMP PROBE: CPT

## 2021-11-22 PROCEDURE — 81002 URINALYSIS NONAUTO W/O SCOPE: CPT | Performed by: PHYSICIAN ASSISTANT

## 2021-11-22 RX ORDER — METRONIDAZOLE 7.5 MG/G
1 GEL VAGINAL
Qty: 5 EACH | Refills: 1 | Status: SHIPPED | OUTPATIENT
Start: 2021-11-22 | End: 2021-11-27

## 2021-11-22 RX ORDER — FLUCONAZOLE 150 MG/1
TABLET ORAL
Qty: 2 TABLET | Refills: 0 | Status: SHIPPED
Start: 2021-11-22 | End: 2022-01-10

## 2021-11-22 ASSESSMENT — PAIN SCALES - GENERAL: PAINLEVEL: 4=SLIGHT-MODERATE PAIN

## 2021-11-22 NOTE — LETTER
[facility] [place] [id number] [place] [address]     November 22, 2021    Patient: Farideh Cunha   YOB: 1990   Date of Visit: 11/22/2021       To Whom It May Concern:      Farideh Cunha was seen and treated in our department on 11/22/2021.       Sincerely,         Elizabeth Box P.A.-C.

## 2021-11-22 NOTE — PROGRESS NOTES
Subjective:     CC:   Chief Complaint   Patient presents with   • Requesting Labs     STD testing, had new partner this weekend   • Dysuria   • Vaginal Discharge     white, new       HPI:   Farideh presents today to discuss:    Vaginal discharge  Pt admits to white vaginal discharge with odor for the past 2 days.  She feels a mild pinching pain when she urinates.  Had intercourse last weekend with a new partner.  She did not wear condoms.  She is on birth control.  No pelvic pain, bleeding.  No lesions.      Past Medical History:   Diagnosis Date   • Anxiety    • Chronic pain    • Depression    • GERD (gastroesophageal reflux disease)    • Panic disorder    • Thyroid disease    • Urinary tract infection      Social History     Tobacco Use   • Smoking status: Never Smoker   • Smokeless tobacco: Never Used   Vaping Use   • Vaping Use: Every day   Substance Use Topics   • Alcohol use: No   • Drug use: No     Comment: CBD use for bladder pain     Current Outpatient Medications Ordered in Epic   Medication Sig Dispense Refill   • metroNIDAZOLE (METROGEL-VAGINAL) 0.75 % Gel Insert 1 Applicator into the vagina at bedtime for 5 days. 5 Each 1   • fluconazole (DIFLUCAN) 150 MG tablet Take 1 tablet, then repeat in 72 hours if symptoms persist. 2 Tablet 0   • ibuprofen (MOTRIN) 200 MG Tab      • NP THYROID 90 MG Tab Take 1 Tablet by mouth every day. 90 Tablet 3   • drospirenone-ethinyl estradiol (RICHARD) 3-0.03 MG per tablet Take 1 Tablet by mouth every day. 84 Tablet 3   • omeprazole (PRILOSEC) 10 MG CAPSULE DELAYED RELEASE Take 1 Capsule by mouth two times a day may change times on alt/days. 180 Capsule 3   • hydrOXYzine HCl (ATARAX) 50 MG Tab Take 1 Tablet by mouth at bedtime. 90 Tablet 3   • amitriptyline (ELAVIL) 25 MG Tab Take 4 Tablets by mouth at bedtime as needed for Mild Pain. 360 Tablet 3   • fluticasone (FLONASE) 50 MCG/ACT nasal spray Administer 1 Spray into affected nostril(S) every day. 16 g 2   • estradiol  "(ESTRACE) 0.1 MG/GM vaginal cream VIC PEA SIZED AMT VAGINALLY HS 42.5 g 3   • colestipol (COLESTID) 1 GM Tab Take two tablets by mouth twice a day. 120 Tab 11   • Meth-Hyo-M Bl-Na Phos-Ph Sal (URIBEL) 118 MG Cap Take 1 Capsule by mouth. Take 1 capsule by mouth two times a day       No current [facility]-ordered facility-administered medications on file.     Bactrim [sulfamethoxazole w-trimethoprim]    ROS: see hpi  Gen: no fevers/chills  Pulm: no sob, no cough  CV: no chest pain, no palpitations, no edema  GI: no nausea/vomiting, no diarrhea  Skin: no rash    Objective:   Exam:  /68 (BP Location: Left arm, Patient Position: Sitting, BP Cuff Size: Small adult)   Pulse 89   Temp 37.2 °C (98.9 °F) (Temporal)   Ht 1.651 m (5' 5\")   Wt 66.2 kg (146 lb)   LMP 11/15/2021 (Exact Date)   SpO2 98%   BMI 24.30 kg/m²    Body mass index is 24.3 kg/m².    Gen: Alert and oriented, No apparent distress.  HEENT: Head atraumatic, normocephalic. Pupils equal and round.  Ext: No clubbing, cyanosis, edema.    Assessment & Plan:     31 y.o. female with the following -     1. Vaginal discharge  Patient with vaginal discharge status post unprotected sex.  Will screen for STDs.  Patient performed self swab for vaginal pathogens and gonorrhea/chlamydia.  Will start patient on MetroGel every evening for 5 days and Diflucan 150 mg x 1.  If STD screen comes back positive we will treat accordingly.  She would like to wait on treatment until they have resulted.  - VAGINAL PATHOGENS DNA PANEL; Future  - Chlamydia/GC PCR Urine Or Swab; Future  - metroNIDAZOLE (METROGEL-VAGINAL) 0.75 % Gel; Insert 1 Applicator into the vagina at bedtime for 5 days.  Dispense: 5 Each; Refill: 1  - fluconazole (DIFLUCAN) 150 MG tablet; Take 1 tablet, then repeat in 72 hours if symptoms persist.  Dispense: 2 Tablet; Refill: 0    2. Dysuria  Urinalysis within normal limits.  - POCT Urinalysis    3. Screening examination for sexually transmitted disease  - " Chlamydia/GC PCR Urine Or Swab; Future  - HIV AG/AB COMBO ASSAY SCREENING; Future  - HSV 1/2 IGG W/ TYPE SPECIFIC RFLX; Future  - T.PALLIDUM AB EIA; Future    Return if symptoms worsen or fail to improve.    Elizabeth Kennedy) Isauro HAWLEY  Physician Assistant Certified  [facility]    Please note that this dictation was created using voice recognition software. I have made every reasonable attempt to correct obvious errors, but I expect that there are errors of grammar and possibly content that I did not discover before finalizing the note.

## 2021-11-23 DIAGNOSIS — N89.8 VAGINAL DISCHARGE: ICD-10-CM

## 2021-11-23 DIAGNOSIS — Z11.3 SCREENING EXAMINATION FOR SEXUALLY TRANSMITTED DISEASE: ICD-10-CM

## 2021-11-23 NOTE — ASSESSMENT & PLAN NOTE
Pt admits to white vaginal discharge with odor for the past 2 days.  She feels a mild pinching pain when she urinates.  Had intercourse last weekend with a new partner.  She did not wear condoms.  She is on birth control.  No pelvic pain, bleeding.  No lesions.

## 2021-11-29 DIAGNOSIS — B96.89 BACTERIAL VAGINOSIS: ICD-10-CM

## 2021-11-29 DIAGNOSIS — N76.0 BACTERIAL VAGINOSIS: ICD-10-CM

## 2021-11-29 RX ORDER — METRONIDAZOLE 500 MG/1
500 TABLET ORAL 2 TIMES DAILY
Qty: 14 TABLET | Refills: 0 | Status: SHIPPED | OUTPATIENT
Start: 2021-11-29 | End: 2021-12-06

## 2021-12-08 DIAGNOSIS — N76.0 BACTERIAL VAGINOSIS: ICD-10-CM

## 2021-12-08 DIAGNOSIS — B96.89 BACTERIAL VAGINOSIS: ICD-10-CM

## 2021-12-08 RX ORDER — METRONIDAZOLE 500 MG/1
500 TABLET ORAL 2 TIMES DAILY
Qty: 14 TABLET | Refills: 0 | Status: SHIPPED | OUTPATIENT
Start: 2021-12-08 | End: 2021-12-15

## 2021-12-23 ENCOUNTER — OFFICE VISIT (OUTPATIENT)
Dept: MEDICAL GROUP | Facility: IMAGING CENTER | Age: 31
End: 2021-12-23
Payer: COMMERCIAL

## 2021-12-23 ENCOUNTER — HOSPITAL ENCOUNTER (OUTPATIENT)
Facility: MEDICAL CENTER | Age: 31
End: 2021-12-23
Attending: PHYSICIAN ASSISTANT
Payer: COMMERCIAL

## 2021-12-23 VITALS
HEART RATE: 93 BPM | BODY MASS INDEX: 25.55 KG/M2 | RESPIRATION RATE: 16 BRPM | HEIGHT: 63 IN | OXYGEN SATURATION: 100 % | WEIGHT: 144.2 LBS | DIASTOLIC BLOOD PRESSURE: 62 MMHG | SYSTOLIC BLOOD PRESSURE: 100 MMHG | TEMPERATURE: 98.3 F

## 2021-12-23 DIAGNOSIS — N89.8 VAGINAL DISCHARGE: ICD-10-CM

## 2021-12-23 DIAGNOSIS — J02.0 STREP THROAT: ICD-10-CM

## 2021-12-23 DIAGNOSIS — Z11.3 SCREENING EXAMINATION FOR SEXUALLY TRANSMITTED DISEASE: ICD-10-CM

## 2021-12-23 DIAGNOSIS — J02.9 SORE THROAT: ICD-10-CM

## 2021-12-23 LAB
APPEARANCE UR: CLEAR
BILIRUB UR STRIP-MCNC: NORMAL MG/DL
COLOR UR AUTO: YELLOW
GLUCOSE UR STRIP.AUTO-MCNC: NORMAL MG/DL
INT CON NEG: NORMAL
INT CON POS: NORMAL
KETONES UR STRIP.AUTO-MCNC: NORMAL MG/DL
LEUKOCYTE ESTERASE UR QL STRIP.AUTO: NORMAL
NITRITE UR QL STRIP.AUTO: NORMAL
PH UR STRIP.AUTO: 7 [PH] (ref 5–8)
PROT UR QL STRIP: NORMAL MG/DL
RBC UR QL AUTO: NORMAL
S PYO AG THROAT QL: NORMAL
SP GR UR STRIP.AUTO: 1.01
UROBILINOGEN UR STRIP-MCNC: 0.2 MG/DL

## 2021-12-23 PROCEDURE — 87660 TRICHOMONAS VAGIN DIR PROBE: CPT

## 2021-12-23 PROCEDURE — 99214 OFFICE O/P EST MOD 30 MIN: CPT | Mod: 25 | Performed by: PHYSICIAN ASSISTANT

## 2021-12-23 PROCEDURE — 87880 STREP A ASSAY W/OPTIC: CPT | Performed by: PHYSICIAN ASSISTANT

## 2021-12-23 PROCEDURE — 87591 N.GONORRHOEAE DNA AMP PROB: CPT

## 2021-12-23 PROCEDURE — 87491 CHLMYD TRACH DNA AMP PROBE: CPT

## 2021-12-23 PROCEDURE — 87480 CANDIDA DNA DIR PROBE: CPT

## 2021-12-23 PROCEDURE — 81002 URINALYSIS NONAUTO W/O SCOPE: CPT | Performed by: PHYSICIAN ASSISTANT

## 2021-12-23 PROCEDURE — 87510 GARDNER VAG DNA DIR PROBE: CPT

## 2021-12-23 RX ORDER — AZITHROMYCIN 500 MG/1
500 TABLET, FILM COATED ORAL DAILY
Qty: 5 TABLET | Refills: 0 | Status: SHIPPED
Start: 2021-12-23 | End: 2022-01-10

## 2021-12-23 RX ORDER — METRONIDAZOLE 500 MG/1
500 TABLET ORAL
Qty: 14 TABLET | Refills: 0 | Status: SHIPPED | OUTPATIENT
Start: 2021-12-23 | End: 2021-12-30

## 2021-12-23 RX ORDER — FLUCONAZOLE 150 MG/1
TABLET ORAL
Qty: 2 TABLET | Refills: 0 | Status: SHIPPED
Start: 2021-12-23 | End: 2022-01-10

## 2021-12-24 LAB
CANDIDA DNA VAG QL PROBE+SIG AMP: NEGATIVE
G VAGINALIS DNA VAG QL PROBE+SIG AMP: NEGATIVE
T VAGINALIS DNA VAG QL PROBE+SIG AMP: NEGATIVE

## 2021-12-24 NOTE — PATIENT INSTRUCTIONS
Bacterial Vaginosis    Bacterial vaginosis is a vaginal infection that occurs when the normal balance of bacteria in the vagina is disrupted. It results from an overgrowth of certain bacteria. This is the most common vaginal infection among women ages 15-44.  Because bacterial vaginosis increases your risk for STIs (sexually transmitted infections), getting treated can help reduce your risk for chlamydia, gonorrhea, herpes, and HIV (human immunodeficiency virus). Treatment is also important for preventing complications in pregnant women, because this condition can cause an early (premature) delivery.  What are the causes?  This condition is caused by an increase in harmful bacteria that are normally present in small amounts in the vagina. However, the reason that the condition develops is not fully understood.  What increases the risk?  The following factors may make you more likely to develop this condition:  · Having a new sexual partner or multiple sexual partners.  · Having unprotected sex.  · Douching.  · Having an intrauterine device (IUD).  · Smoking.  · Drug and alcohol abuse.  · Taking certain antibiotic medicines.  · Being pregnant.  You cannot get bacterial vaginosis from toilet seats, bedding, swimming pools, or contact with objects around you.  What are the signs or symptoms?  Symptoms of this condition include:  · Grey or white vaginal discharge. The discharge can also be watery or foamy.  · A fish-like odor with discharge, especially after sexual intercourse or during menstruation.  · Itching in and around the vagina.  · Burning or pain with urination.  Some women with bacterial vaginosis have no signs or symptoms.  How is this diagnosed?  This condition is diagnosed based on:  · Your medical history.  · A physical exam of the vagina.  · Testing a sample of vaginal fluid under a microscope to look for a large amount of bad bacteria or abnormal cells. Your health care provider may use a cotton swab or  a small wooden spatula to collect the sample.  How is this treated?  This condition is treated with antibiotics. These may be given as a pill, a vaginal cream, or a medicine that is put into the vagina (suppository). If the condition comes back after treatment, a second round of antibiotics may be needed.  Follow these instructions at home:  Medicines  · Take over-the-counter and prescription medicines only as told by your health care provider.  · Take or use your antibiotic as told by your health care provider. Do not stop taking or using the antibiotic even if you start to feel better.  General instructions  · If you have a female sexual partner, tell her that you have a vaginal infection. She should see her health care provider and be treated if she has symptoms. If you have a male sexual partner, he does not need treatment.  · During treatment:  ? Avoid sexual activity until you finish treatment.  ? Do not douche.  ? Avoid alcohol as directed by your health care provider.  ? Avoid breastfeeding as directed by your health care provider.  · Drink enough water and fluids to keep your urine clear or pale yellow.  · Keep the area around your vagina and rectum clean.  ? Wash the area daily with warm water.  ? Wipe yourself from front to back after using the toilet.  · Keep all follow-up visits as told by your health care provider. This is important.  How is this prevented?  · Do not douche.  · Wash the outside of your vagina with warm water only.  · Use protection when having sex. This includes latex condoms and dental dams.  · Limit how many sexual partners you have. To help prevent bacterial vaginosis, it is best to have sex with just one partner (monogamous).  · Make sure you and your sexual partner are tested for STIs.  · Wear cotton or cotton-lined underwear.  · Avoid wearing tight pants and pantyhose, especially during summer.  · Limit the amount of alcohol that you drink.  · Do not use any products that contain  nicotine or tobacco, such as cigarettes and e-cigarettes. If you need help quitting, ask your health care provider.  · Do not use illegal drugs.  Where to find more information  · Centers for Disease Control and Prevention: www.cdc.gov/std  · American Sexual Health Association (ROXY): www.ashastd.org  · U.S. Department of Health and Human Services, Office on Women's Health: www.womenshealth.gov/ or https://www.womenshealth.gov/a-z-topics/bacterial-vaginosis  Contact a health care provider if:  · Your symptoms do not improve, even after treatment.  · You have more discharge or pain when urinating.  · You have a fever.  · You have pain in your abdomen.  · You have pain during sex.  · You have vaginal bleeding between periods.  Summary  · Bacterial vaginosis is a vaginal infection that occurs when the normal balance of bacteria in the vagina is disrupted.  · Because bacterial vaginosis increases your risk for STIs (sexually transmitted infections), getting treated can help reduce your risk for chlamydia, gonorrhea, herpes, and HIV (human immunodeficiency virus). Treatment is also important for preventing complications in pregnant women, because the condition can cause an early (premature) delivery.  · This condition is treated with antibiotic medicines. These may be given as a pill, a vaginal cream, or a medicine that is put into the vagina (suppository).  This information is not intended to replace advice given to you by your health care provider. Make sure you discuss any questions you have with your health care provider.  Document Released: 12/18/2006 Document Revised: 11/30/2018 Document Reviewed: 09/02/2017  Elsevier Patient Education © 2020 [employer] Inc[employer]

## 2021-12-24 NOTE — PROGRESS NOTES
Subjective:     CC:   Chief Complaint   Patient presents with   • Other     STD testing        HPI:   Farideh presents today to discuss:    Vaginal discharge  Patient admits to vaginal discharge, she states that there is no odor but it is very watery.  She is sexually active with multiple partners.  She does not use condoms.  She states that her sexual partners do show her negative STD tests but she knows that they are not monogamous with her.  She does use soap to clean vaginally.    Sore throat  Patient admits to sore throat for the past couple days, she admits to frequent strep throat infections.  She does have multiple sexual partners.      Past Medical History:   Diagnosis Date   • Anxiety    • Chronic pain    • Depression    • GERD (gastroesophageal reflux disease)    • Panic disorder    • Thyroid disease    • Urinary tract infection      Social History     Tobacco Use   • Smoking status: Never Smoker   • Smokeless tobacco: Never Used   Vaping Use   • Vaping Use: Every day   Substance Use Topics   • Alcohol use: No   • Drug use: No     Comment: CBD use for bladder pain     Current Outpatient Medications Ordered in Epic   Medication Sig Dispense Refill   • fluconazole (DIFLUCAN) 150 MG tablet Take 1 tablet, then repeat in 72 hours if symptoms persist. 2 Tablet 0   • metroNIDAZOLE (FLAGYL) 500 MG Tab Take 1 Tablet by mouth 2 (two) times a day for 7 days. 14 Tablet 0   • azithromycin (ZITHROMAX) 500 MG tablet Take 1 Tablet by mouth every day. 5 Tablet 0   • fluconazole (DIFLUCAN) 150 MG tablet Take 1 tablet, then repeat in 72 hours if symptoms persist. 2 Tablet 0   • ibuprofen (MOTRIN) 200 MG Tab      • NP THYROID 90 MG Tab Take 1 Tablet by mouth every day. 90 Tablet 3   • drospirenone-ethinyl estradiol (RICHARD) 3-0.03 MG per tablet Take 1 Tablet by mouth every day. 84 Tablet 3   • omeprazole (PRILOSEC) 10 MG CAPSULE DELAYED RELEASE Take 1 Capsule by mouth two times a day may change times on alt/days. 180 Capsule  "3   • hydrOXYzine HCl (ATARAX) 50 MG Tab Take 1 Tablet by mouth at bedtime. 90 Tablet 3   • amitriptyline (ELAVIL) 25 MG Tab Take 4 Tablets by mouth at bedtime as needed for Mild Pain. 360 Tablet 3   • fluticasone (FLONASE) 50 MCG/ACT nasal spray Administer 1 Spray into affected nostril(S) every day. 16 g 2   • estradiol (ESTRACE) 0.1 MG/GM vaginal cream VIC PEA SIZED AMT VAGINALLY HS 42.5 g 3   • colestipol (COLESTID) 1 GM Tab Take two tablets by mouth twice a day. 120 Tab 11   • Meth-Hyo-M Bl-Na Phos-Ph Sal (URIBEL) 118 MG Cap Take 1 Capsule by mouth. Take 1 capsule by mouth two times a day       No current [facility]-ordered facility-administered medications on file.     Bactrim [sulfamethoxazole w-trimethoprim]      ROS: see hpi  Gen: no fevers/chills  Pulm: no sob, no cough  CV: no chest pain, no palpitations, no edema  GI: no nausea/vomiting, no diarrhea  Skin: no rash    Objective:   Exam:  /62   Pulse 93   Temp 36.8 °C (98.3 °F) (Temporal)   Resp 16   Ht 1.595 m (5' 2.8\")   Wt 65.4 kg (144 lb 3.2 oz)   SpO2 100%   BMI 25.71 kg/m²    Body mass index is 25.71 kg/m².    Gen: Alert and oriented, No apparent distress.  HEENT: Head atraumatic, normocephalic. Pupils equal and round.  Patient's mask was kept on during appointment  Neck: Neck is supple without lymphadenopathy.   Ext: No clubbing, cyanosis, edema.    Assessment & Plan:     31 y.o. female with the following -     1. Strep throat  Acute, will start azithromycin 500 mg daily for 5 days.  Refer to ENT due to recurrence of strep, may be chronic colonizer.  - POCT Rapid Strep A  - Referral to ENT  - azithromycin (ZITHROMAX) 500 MG tablet; Take 1 Tablet by mouth every day.  Dispense: 5 Tablet; Refill: 0    2. Sore throat    3. Vaginal discharge  Recurrent, discussed preventative methods for reducing risk of infection including wearing condoms and using hot water to wash vaginally as well as wearing cotton underwear.  STD screening ordered today push " precautions for fluconazole and metronidazole given to patient if cultures come back positive.  Urinalysis within normal limits.  - VAGINAL PATHOGENS DNA PANEL; Future  - Chlamydia/GC PCR Urine Or Swab; Future  - POCT Urinalysis  - fluconazole (DIFLUCAN) 150 MG tablet; Take 1 tablet, then repeat in 72 hours if symptoms persist.  Dispense: 2 Tablet; Refill: 0  - metroNIDAZOLE (FLAGYL) 500 MG Tab; Take 1 Tablet by mouth 2 (two) times a day for 7 days.  Dispense: 14 Tablet; Refill: 0    4. Screening examination for sexually transmitted disease  Patient had previous orders for HIV, syphilis, HSV, encouraged patient to get those drawn.  - VAGINAL PATHOGENS DNA PANEL; Future  - Chlamydia/GC PCR Urine Or Swab; Future    Return for Will notify patient to follow-up pending tests.    Elizabeth Box PA-C ([place])  Physician Assistant Certified  [facility]    Please note that this dictation was created using voice recognition software. I have made every reasonable attempt to correct obvious errors, but I expect that there are errors of grammar and possibly content that I did not discover before finalizing the note.

## 2021-12-24 NOTE — ASSESSMENT & PLAN NOTE
Patient admits to sore throat for the past couple days, she admits to frequent strep throat infections.  She does have multiple sexual partners.

## 2021-12-24 NOTE — ASSESSMENT & PLAN NOTE
Patient admits to vaginal discharge, she states that there is no odor but it is very watery.  She is sexually active with multiple partners.  She does not use condoms.  She states that her sexual partners do show her negative STD tests but she knows that they are not monogamous with her.  She does use soap to clean vaginally.

## 2022-01-10 ENCOUNTER — OFFICE VISIT (OUTPATIENT)
Dept: MEDICAL GROUP | Facility: IMAGING CENTER | Age: 32
End: 2022-01-10
Payer: COMMERCIAL

## 2022-01-10 VITALS
DIASTOLIC BLOOD PRESSURE: 72 MMHG | SYSTOLIC BLOOD PRESSURE: 118 MMHG | HEART RATE: 83 BPM | TEMPERATURE: 100.4 F | WEIGHT: 148 LBS | RESPIRATION RATE: 14 BRPM | OXYGEN SATURATION: 92 % | BODY MASS INDEX: 24.66 KG/M2 | HEIGHT: 65 IN

## 2022-01-10 DIAGNOSIS — R11.2 NON-INTRACTABLE VOMITING WITH NAUSEA, UNSPECIFIED VOMITING TYPE: ICD-10-CM

## 2022-01-10 DIAGNOSIS — U07.1 ACUTE COVID-19: ICD-10-CM

## 2022-01-10 DIAGNOSIS — R05.9 COUGH: ICD-10-CM

## 2022-01-10 PROCEDURE — 99214 OFFICE O/P EST MOD 30 MIN: CPT | Performed by: CLINICAL NURSE SPECIALIST

## 2022-01-10 RX ORDER — DEXTROMETHORPHAN HBR 15 MG/1
15 CAPSULE, LIQUID FILLED ORAL NIGHTLY PRN
Qty: 30 CAPSULE | Refills: 0 | Status: SHIPPED
Start: 2022-01-10 | End: 2022-02-11

## 2022-01-10 RX ORDER — GUAIFENESIN 200 MG/1
200 TABLET ORAL EVERY 4 HOURS PRN
Qty: 30 TABLET | Refills: 1 | Status: SHIPPED
Start: 2022-01-10 | End: 2022-02-11

## 2022-01-10 RX ORDER — AMOXICILLIN AND CLAVULANATE POTASSIUM 500; 125 MG/1; MG/1
TABLET, FILM COATED ORAL
COMMUNITY
Start: 2022-01-04 | End: 2022-02-11

## 2022-01-10 ASSESSMENT — PAIN SCALES - GENERAL: PAINLEVEL: 7=MODERATE-SEVERE PAIN

## 2022-01-10 NOTE — LETTER
[facility] [address] [place] [id number]  [place] [address]     January 10, 2022    Patient: Farideh Cunha   YOB: 1990   Date of Visit: 1/10/2022       To Whom It May Concern:    Farideh Cunha was seen and treated in our department on 1/10/2022. She is unable to work 1/10/22-1/17/22.    Sincerely,     LEW De La Rosa.

## 2022-01-10 NOTE — PATIENT INSTRUCTIONS
Dextromethorphan capsule  What is this medicine?  DEXTROMETHORPHAN (dex troe meth OR fan) is used to help relieve cough.  This medicine may be used for other purposes; ask your health care provider or pharmacist if you have questions.  COMMON BRAND NAME(S): Dexalone, Robafen Cough, Robitussin Cough, Robitussin Lingering Cold, Robitussin Lingering Cold Long-Acting Cough  What should I tell my health care provider before I take this medicine?  They need to know if you have any of these conditions:  · asthma  · emphysema  · large amount of mucus  · liver disease  · smoker  · an unusual or allergic reaction to dextromethorphan, other medicines, bromides, foods, dyes, or preservatives  · pregnant or trying to get pregnant  · breast-feeding  How should I use this medicine?  Take this medicine by mouth with a glass of water. Follow the directions on the prescription label. Take your medicine at regular intervals. Do not take it more often than directed.  Talk to your pediatrician regarding the use of this medicine in children. While this drug may be prescribed for children as young as 12 years old for selected conditions, precautions do apply.  Overdosage: If you think you have taken too much of this medicine contact a poison control center or emergency room at once.  NOTE: This medicine is only for you. Do not share this medicine with others.  What if I miss a dose?  If you miss a dose, take it as soon as you can. If it is almost time for your next dose, take only that dose. Do not take double or extra doses.  What may interact with this medicine?  Do not take this medicine with any of the following medications:  · MAOIs like Carbex, Eldepryl, Marplan, Nardil, and Parnate  This medicine may also interact with the following medications:  · medicines for depression, anxiety, or psychotic disturbances  · other medicines for allergies or cold  · procarbazine  This list may not describe all possible interactions. Give your  health care provider a list of all the medicines, herbs, non-prescription drugs, or dietary supplements you use. Also tell them if you smoke, drink alcohol, or use illegal drugs. Some items may interact with your medicine.  What should I watch for while using this medicine?  Do not treat yourself for a cough for more than 1 week without consulting your doctor or health care professional. If you have a high fever, skin rash, lasting headache, or sore throat, see your doctor.  You may get drowsy or dizzy. Do not drive, use machinery, or do anything that needs mental alertness until you know how this medicine affects you. Do not stand or sit up quickly, especially if you are an older patient. This reduces the risk of dizzy or fainting spells. Alcohol may interfere with the effect of this medicine. Avoid alcoholic drinks.  What side effects may I notice from receiving this medicine?  Side effects that you should report to your doctor or health care professional as soon as possible:  · allergic reactions like skin rash, itching or hives, swelling of the face, lips, or tongue  · breathing problems  · confusion  · excitement, nervousness, restlessness, or irritability  · seizure  · slurred speech  Side effects that usually do not require medical attention (report to your doctor or health care professional if they continue or are bothersome):  · headache  · stomach upset  · tiredness  This list may not describe all possible side effects. Call your doctor for medical advice about side effects. You may report side effects to FDA at 1-031-FDA-8510.  Where should I keep my medicine?  Keep out of the reach of children.  Store at room temperature between 20 and 25 degrees C (68 and 77 degrees F) unless otherwise directed. Avoid heat over 40 degrees C (104 degrees F). Protect from light. Throw away any unused medicine after the expiration date.  NOTE: This sheet is a summary. It may not cover all possible information. If you have  questions about this medicine, talk to your doctor, pharmacist, or health care provider.  © 2020 Elsevier/Gold Standard (2009-04-17 15:46:10)    Ondansetron oral dissolving tablet  What is this medicine?  ONDANSETRON (on DAN se carrie) is used to treat nausea and vomiting caused by chemotherapy. It is also used to prevent or treat nausea and vomiting after surgery.  This medicine may be used for other purposes; ask your health care provider or pharmacist if you have questions.  COMMON BRAND NAME(S): Zofran ODT  What should I tell my health care provider before I take this medicine?  They need to know if you have any of these conditions:  · heart disease  · history of irregular heartbeat  · liver disease  · low levels of magnesium or potassium in the blood  · an unusual or allergic reaction to ondansetron, granisetron, other medicines, foods, dyes, or preservatives  · pregnant or trying to get pregnant  · breast-feeding  How should I use this medicine?  These tablets are made to dissolve in the mouth. Do not try to push the tablet through the foil backing. With dry hands, peel away the foil backing and gently remove the tablet. Place the tablet in the mouth and allow it to dissolve, then swallow. While you may take these tablets with water, it is not necessary to do so.  Talk to your pediatrician regarding the use of this medicine in children. Special care may be needed.  Overdosage: If you think you have taken too much of this medicine contact a poison control center or emergency room at once.  NOTE: This medicine is only for you. Do not share this medicine with others.  What if I miss a dose?  If you miss a dose, take it as soon as you can. If it is almost time for your next dose, take only that dose. Do not take double or extra doses.  What may interact with this medicine?  Do not take this medicine with any of the following medications:  · apomorphine  · certain medicines for fungal infections like fluconazole,  itraconazole, ketoconazole, posaconazole, voriconazole  · cisapride  · dronedarone  · pimozide  · thioridazine  This medicine may also interact with the following medications:  · carbamazepine  · certain medicines for depression, anxiety, or psychotic disturbances  · fentanyl  · linezolid  · MAOIs like Carbex, Eldepryl, Marplan, Nardil, and Parnate  · methylene blue (injected into a vein)  · other medicines that prolong the QT interval (cause an abnormal heart rhythm) like dofetilide, ziprasidone  · phenytoin  · rifampicin  · tramadol  This list may not describe all possible interactions. Give your health care provider a list of all the medicines, herbs, non-prescription drugs, or dietary supplements you use. Also tell them if you smoke, drink alcohol, or use illegal drugs. Some items may interact with your medicine.  What should I watch for while using this medicine?  Check with your doctor or health care professional as soon as you can if you have any sign of an allergic reaction.  What side effects may I notice from receiving this medicine?  Side effects that you should report to your doctor or health care professional as soon as possible:  · allergic reactions like skin rash, itching or hives, swelling of the face, lips, or tongue  · breathing problems  · confusion  · dizziness  · fast or irregular heartbeat  · feeling faint or lightheaded, falls  · fever and chills  · loss of balance or coordination  · seizures  · sweating  · swelling of the hands and feet  · tightness in the chest  · tremors  · unusually weak or tired  Side effects that usually do not require medical attention (report to your doctor or health care professional if they continue or are bothersome):  · constipation or diarrhea  · headache  This list may not describe all possible side effects. Call your doctor for medical advice about side effects. You may report side effects to FDA at 4-714-FDA-4604.  Where should I keep my medicine?  Keep out of  the reach of children.  Store between 2 and 30 degrees C (36 and 86 degrees F). Throw away any unused medicine after the expiration date.  NOTE: This sheet is a summary. It may not cover all possible information. If you have questions about this medicine, talk to your doctor, pharmacist, or health care provider.  © 2020 Elsevier/Gold Standard (2019-12-10 07:14:10)    Guaifenesin oral solution and syrup  What is this medicine?  GUAIFENESIN (gwye FEN e sin) is an expectorant. It helps to thin mucous and make coughs more productive. This medicine is used to treat coughs caused by colds or the flu. It is not intended to treat chronic cough caused by smoking, asthma, emphysema, or heart failure.  This medicine may be used for other purposes; ask your health care provider or pharmacist if you have questions.  COMMON BRAND NAME(S): Altarussin, Altorant, Cough, Diabetic Tussin EX, Diabetic Tussin Mucus Relief, ElixSure EX, Ganidin NR, AB-TUSSIN, Guiatuss, Iophen-NR, Miltuss EX, Mucinex Children's, Mucus + Chest Congestion, Mucus Relief Children's, Naldecon, Organidin NR, Q-Tussin, Robafen, Robafen Congestion, Robitussin, Robitussin Mucus + Chest Congestion, Scot-Tussin Expectorant, Siltussin ONEAL, Siltussin Diabetic ONEAL-Na, Siltussin SA  What should I tell my health care provider before I take this medicine?  They need to know if you have any of these conditions:  · diabetes  · fever  · kidney disease  · an unusual or allergic reaction to guaifenesin, other medicines, foods, dyes, or preservatives  · pregnant or trying to get pregnant  · breast-feeding  How should I use this medicine?  Take this medicine by mouth. Follow the directions on the prescription label. Use a specially marked spoon or container to measure your dose. Household spoons are not accurate. Take your medicine at regular intervals. Do not take it more often than directed.  Talk to your pediatrician regarding the use of this medicine in children. Special  care may be needed.  Overdosage: If you think you have taken too much of this medicine contact a poison control center or emergency room at once.  NOTE: This medicine is only for you. Do not share this medicine with others.  What if I miss a dose?  If you miss a dose, take it as soon as you can. If it is almost time for your next dose, take only that dose. Do not take double or extra doses.  What may interact with this medicine?  Interactions are not expected.  This list may not describe all possible interactions. Give your health care provider a list of all the medicines, herbs, non-prescription drugs, or dietary supplements you use. Also tell them if you smoke, drink alcohol, or use illegal drugs. Some items may interact with your medicine.  What should I watch for while using this medicine?  Do not treat a cough for more than 1 week without consulting your doctor or health care professional. If you also have a high fever, skin rash, continuing headache, or sore throat, see your doctor.  For best results, drink 6 to 8 glasses water daily while you are taking this medicine.  What side effects may I notice from receiving this medicine?  Side effects that you should report to your doctor or health care professional as soon as possible:  · allergic reactions like skin rash, itching or hives, swelling of the face, lips, or tongue  Side effects that usually do not require medical attention (report to your doctor or health care professional if they continue or are bothersome):  · dizziness  · headache  · stomach upset  This list may not describe all possible side effects. Call your doctor for medical advice about side effects. You may report side effects to FDA at 1-434-FDA-6335.  Where should I keep my medicine?  Keep out of the reach of children.  Store at room temperature between 20 and 25 degrees C (68 and 77 degrees F). Do not freeze. Keep container tightly closed. Throw away any unused medicine after the expiration  date.  NOTE: This sheet is a summary. It may not cover all possible information. If you have questions about this medicine, talk to your doctor, pharmacist, or health care provider.  © 2020 Elsevier/Gold Standard (2009-04-29 11:48:29)

## 2022-01-10 NOTE — PROGRESS NOTES
Subjective     Farideh Cunha is a 31 y.o. female who presents with Body Aches (covid positive 01/06/22)            HPI    Tested positive for COVID-19.  Diarrhea, vomiting, body aches, mucus, fever, headache, low energy. Last diarrhea today, vomiting 2 days ago.  She is unable to keep food or liquid in her body and reports that when she eats she immediately has to use the restroom.  Cough dry frequent and mucus.  No shortness of breath.     ROS  See HPI      Allergies   Allergen Reactions   • Bactrim [Sulfamethoxazole W-Trimethoprim] Swelling      swelling      Current Outpatient Medications on File Prior to Visit   Medication Sig Dispense Refill   • ibuprofen (MOTRIN) 200 MG Tab      • NP THYROID 90 MG Tab Take 1 Tablet by mouth every day. 90 Tablet 3   • drospirenone-ethinyl estradiol (RICHARD) 3-0.03 MG per tablet Take 1 Tablet by mouth every day. 84 Tablet 3   • omeprazole (PRILOSEC) 10 MG CAPSULE DELAYED RELEASE Take 1 Capsule by mouth two times a day may change times on alt/days. 180 Capsule 3   • hydrOXYzine HCl (ATARAX) 50 MG Tab Take 1 Tablet by mouth at bedtime. 90 Tablet 3   • amitriptyline (ELAVIL) 25 MG Tab Take 4 Tablets by mouth at bedtime as needed for Mild Pain. 360 Tablet 3   • fluticasone (FLONASE) 50 MCG/ACT nasal spray Administer 1 Spray into affected nostril(S) every day. 16 g 2   • estradiol (ESTRACE) 0.1 MG/GM vaginal cream VIC PEA SIZED AMT VAGINALLY HS 42.5 g 3   • Meth-Hyo-M Bl-Na Phos-Ph Sal (URIBEL) 118 MG Cap Take 1 Capsule by mouth. Take 1 capsule by mouth two times a day     • amoxicillin-clavulanate (AUGMENTIN) 500-125 MG Tab      • fluconazole (DIFLUCAN) 150 MG tablet Take 1 tablet, then repeat in 72 hours if symptoms persist. (Patient not taking: Reported on 1/10/2022) 2 Tablet 0   • azithromycin (ZITHROMAX) 500 MG tablet Take 1 Tablet by mouth every day. (Patient not taking: Reported on 1/10/2022) 5 Tablet 0   • fluconazole (DIFLUCAN) 150 MG tablet Take 1 tablet, then repeat in  "72 hours if symptoms persist. (Patient not taking: Reported on 1/10/2022) 2 Tablet 0   • colestipol (COLESTID) 1 GM Tab Take two tablets by mouth twice a day. (Patient not taking: Reported on 1/10/2022) 120 Tab 11     No current facility-administered medications on file prior to visit.           Objective     /72 (BP Location: Left arm, Patient Position: Sitting, BP Cuff Size: Adult)   Pulse 83   Temp 38 °C (100.4 °F) (Temporal)   Resp 14   Ht 1.651 m (5' 5\")   Wt 67.1 kg (148 lb)   SpO2 92%   BMI 24.63 kg/m²      Physical Exam  Constitutional:       General: She is not in acute distress.     Appearance: Normal appearance. She is not ill-appearing, toxic-appearing or diaphoretic.   HENT:      Head: Normocephalic and atraumatic.   Eyes:      General: No scleral icterus.     Pupils: Pupils are equal, round, and reactive to light.   Neck:      Comments: Tenderness below jaw  Cardiovascular:      Rate and Rhythm: Normal rate and regular rhythm.      Heart sounds: Normal heart sounds.   Pulmonary:      Effort: Pulmonary effort is normal. No respiratory distress.      Breath sounds: Normal breath sounds. No wheezing.   Lymphadenopathy:      Cervical: No cervical adenopathy.   Skin:     General: Skin is warm and dry.   Neurological:      Mental Status: She is alert and oriented to person, place, and time.      Gait: Gait normal.   Psychiatric:         Mood and Affect: Mood normal.         Behavior: Behavior normal.         Thought Content: Thought content normal.         Judgment: Judgment normal.                    Hospital Outpatient Visit on 12/23/2021   Component Date Value   • C. trachomatis by PCR 12/23/2021 Negative    • N. gonorrhoeae by PCR 12/23/2021 Negative    • Source 12/23/2021 Urine    • Candida species DNA Probe 12/23/2021 Negative    • Trichamonas vaginalis DN* 12/23/2021 Negative    • Gardnerella vaginalis DN* 12/23/2021 Negative    Office Visit on 12/23/2021   Component Date Value   • Rapid " Strep Screen 12/23/2021 POSTIVE    • Internal Control Positive 12/23/2021 Valid    • Internal Control Negative 12/23/2021 Valid    • POC Color 12/23/2021 YELLOW    • POC Appearance 12/23/2021 CLEAR    • POC Leukocyte Esterase 12/23/2021 NEG    • POC Nitrites 12/23/2021 NEG    • POC Urobiligen 12/23/2021 0.2    • POC Protein 12/23/2021 NEG    • POC Urine PH 12/23/2021 7.0    • POC Blood 12/23/2021 NEG    • POC Specific Gravity 12/23/2021 1.015    • POC Ketones 12/23/2021 NEG    • POC Bilirubin 12/23/2021 NEG    • POC Glucose 12/23/2021 NEG                 Assessment & Plan        1. Acute COVID-19  Positive. Symptomatic for 4 days.  Supportive care including adequate hydration and nutrition, rest.  Zofran ordered to improve nausea and hopefully allow her to tolerate nutrition.  If diarrhea persists, she can take Imodium.  For cough, she can take Mucinex as needed to help expel any phlegm. She may take dextromethorphan at night for cough and to help her sleep.  She was given education handouts on all medications prescribed.    2. Non-intractable vomiting with nausea, unspecified vomiting type  Educated on QT prolongation possibility and should report and cardiac changes.   - Ondansetron 4 MG FILM; Take 4 mg by mouth every 6 hours as needed (nausea).  Dispense: 10 Each; Refill: 0    3. Cough  Educated on addictive potential of dextromethorphan and max daily doses of both meds.      - Dextromethorphan HBr 15 MG Cap; Take 1 Capsule by mouth at bedtime as needed.  Dispense: 30 Capsule; Refill: 0  - guaifenesin 200 MG tablet; Take 1 Tablet by mouth every four hours as needed.  Dispense: 30 Tablet; Refill: 1    Return if symptoms worsen or fail to improve.

## 2022-01-10 NOTE — LETTER
[facility] [place] [id number]  [place] [address]     January 10, 2022    Patient: Farideh Cunha   YOB: 1990   Date of Visit: 1/10/2022       To Whom It May Concern:    Farideh Cunha was seen and treated in our department on 1/10/2022. She is unable to work 1/10/22-1/16/22.    Sincerely,     LEW De La Rosa.

## 2022-01-11 ENCOUNTER — TELEPHONE (OUTPATIENT)
Dept: MEDICAL GROUP | Facility: IMAGING CENTER | Age: 32
End: 2022-01-11

## 2022-01-12 NOTE — TELEPHONE ENCOUNTER
[facility] called in regards to zofran film script. The zofran film tabs are no longer available and pharmacy wondering if could be substituted with tablets or 'odt'.    Ph. [phone]

## 2022-02-11 ENCOUNTER — OFFICE VISIT (OUTPATIENT)
Dept: MEDICAL GROUP | Facility: IMAGING CENTER | Age: 32
End: 2022-02-11
Payer: COMMERCIAL

## 2022-02-11 ENCOUNTER — HOSPITAL ENCOUNTER (OUTPATIENT)
Facility: MEDICAL CENTER | Age: 32
End: 2022-02-11
Attending: PHYSICIAN ASSISTANT
Payer: COMMERCIAL

## 2022-02-11 VITALS
TEMPERATURE: 99.1 F | HEART RATE: 90 BPM | SYSTOLIC BLOOD PRESSURE: 102 MMHG | HEIGHT: 63 IN | WEIGHT: 145 LBS | DIASTOLIC BLOOD PRESSURE: 52 MMHG | BODY MASS INDEX: 25.69 KG/M2

## 2022-02-11 DIAGNOSIS — Z13.21 ENCOUNTER FOR VITAMIN DEFICIENCY SCREENING: ICD-10-CM

## 2022-02-11 DIAGNOSIS — Z13.1 DIABETES MELLITUS SCREENING: ICD-10-CM

## 2022-02-11 DIAGNOSIS — Z13.220 SCREENING CHOLESTEROL LEVEL: ICD-10-CM

## 2022-02-11 DIAGNOSIS — Z11.3 SCREENING EXAMINATION FOR SEXUALLY TRANSMITTED DISEASE: ICD-10-CM

## 2022-02-11 DIAGNOSIS — N92.6 IRREGULAR MENSTRUAL CYCLE: ICD-10-CM

## 2022-02-11 DIAGNOSIS — E06.3 HASHIMOTO'S DISEASE: ICD-10-CM

## 2022-02-11 DIAGNOSIS — Z13.29 SCREENING FOR THYROID DISORDER: ICD-10-CM

## 2022-02-11 DIAGNOSIS — Z13.0 SCREENING FOR DEFICIENCY ANEMIA: ICD-10-CM

## 2022-02-11 LAB
APPEARANCE UR: CLEAR
BILIRUB UR STRIP-MCNC: NEGATIVE MG/DL
COLOR UR AUTO: YELLOW
GLUCOSE UR STRIP.AUTO-MCNC: NEGATIVE MG/DL
INT CON NEG: NORMAL
INT CON POS: NORMAL
KETONES UR STRIP.AUTO-MCNC: NEGATIVE MG/DL
LEUKOCYTE ESTERASE UR QL STRIP.AUTO: NEGATIVE
NITRITE UR QL STRIP.AUTO: NEGATIVE
PH UR STRIP.AUTO: 7.5 [PH] (ref 5–8)
POC URINE PREGNANCY TEST: NEGATIVE
PROT UR QL STRIP: NEGATIVE MG/DL
RBC UR QL AUTO: NEGATIVE
SP GR UR STRIP.AUTO: 1.01
UROBILINOGEN UR STRIP-MCNC: 0.2 MG/DL

## 2022-02-11 PROCEDURE — 87491 CHLMYD TRACH DNA AMP PROBE: CPT

## 2022-02-11 PROCEDURE — 99214 OFFICE O/P EST MOD 30 MIN: CPT | Mod: 25 | Performed by: PHYSICIAN ASSISTANT

## 2022-02-11 PROCEDURE — 87591 N.GONORRHOEAE DNA AMP PROB: CPT

## 2022-02-11 PROCEDURE — 81025 URINE PREGNANCY TEST: CPT | Performed by: PHYSICIAN ASSISTANT

## 2022-02-11 PROCEDURE — 81002 URINALYSIS NONAUTO W/O SCOPE: CPT | Performed by: PHYSICIAN ASSISTANT

## 2022-02-11 ASSESSMENT — PAIN SCALES - GENERAL: PAINLEVEL: NO PAIN

## 2022-02-11 NOTE — PROGRESS NOTES
Subjective:     CC:   Chief Complaint   Patient presents with   • Menstrual Problem     skipped period LMP november       HPI:   Farideh presents today to discuss:    Irregular menstrual cycle  Patient states that in December she took her birth control and skipped the placebo week and continued onto the next pack into January.  She can develop a COVID-19 infection early January.  At the end of her January birth control pack she took the placebo week and did not have a menstrual cycle so she stayed off of her birth control for 1 more week.  She restarted birth control pack 4 days ago and is still not bled.  Last time she had intercourse was early December.  She denies any pelvic pain, cramping, discharge.  She is requesting STD screening today, as she has had multiple partners in the past.    Hashimoto's disease  Chronic, on NP thyroid 90 mg daily.       Past Medical History:   Diagnosis Date   • Anxiety    • Chronic pain    • Depression    • GERD (gastroesophageal reflux disease)    • Panic disorder    • Thyroid disease    • Urinary tract infection      Social History     Tobacco Use   • Smoking status: Never Smoker   • Smokeless tobacco: Never Used   Vaping Use   • Vaping Use: Every day   Substance Use Topics   • Alcohol use: No   • Drug use: No     Comment: CBD use for bladder pain     Current Outpatient Medications Ordered in Epic   Medication Sig Dispense Refill   • NITROFURANTOIN PO Take 100 mg by mouth. PRN after sex     • Ondansetron 4 MG FILM Take 4 mg by mouth every 6 hours as needed (nausea). 10 Each 0   • ibuprofen (MOTRIN) 200 MG Tab      • NP THYROID 90 MG Tab Take 1 Tablet by mouth every day. 90 Tablet 3   • drospirenone-ethinyl estradiol (RICHARD) 3-0.03 MG per tablet Take 1 Tablet by mouth every day. 84 Tablet 3   • omeprazole (PRILOSEC) 10 MG CAPSULE DELAYED RELEASE Take 1 Capsule by mouth two times a day may change times on alt/days. 180 Capsule 3   • hydrOXYzine HCl (ATARAX) 50 MG Tab Take 1 Tablet  "by mouth at bedtime. 90 Tablet 3   • amitriptyline (ELAVIL) 25 MG Tab Take 4 Tablets by mouth at bedtime as needed for Mild Pain. 360 Tablet 3   • fluticasone (FLONASE) 50 MCG/ACT nasal spray Administer 1 Spray into affected nostril(S) every day. 16 g 2   • estradiol (ESTRACE) 0.1 MG/GM vaginal cream VIC PEA SIZED AMT VAGINALLY HS 42.5 g 3   • Meth-Hyo-M Bl-Na Phos-Ph Sal (URIBEL) 118 MG Cap Take 1 Capsule by mouth. Take 1 capsule by mouth two times a day       No current [place]-ordered facility-administered medications on file.     Bactrim [sulfamethoxazole w-trimethoprim]    Health Maintenance: Pap smear and annual next visit.  Encouraged Covid booster.    ROS: see hpi  Gen: no fevers/chills  Pulm: no sob, no cough  CV: no chest pain, no palpitations, no edema  GI: no nausea/vomiting, no diarrhea  Skin: no rash    Objective:   Exam:  /52 (BP Location: Left arm, Patient Position: Sitting, BP Cuff Size: Adult)   Temp 37.3 °C (99.1 °F) (Temporal)   Ht 1.595 m (5' 2.8\")   Wt 65.8 kg (145 lb)   LMP 11/11/2021 (Within Weeks)   BMI 25.85 kg/m²    Body mass index is 25.85 kg/m².    Gen: Alert and oriented, No apparent distress.  HEENT: Head atraumatic, normocephalic. Pupils equal and round.  Neck: Neck is supple without lymphadenopathy.  Right-sided thyroid nodule felt on exam.  Lungs: Normal effort, CTA bilaterally, no wheezes, rhonchi, or rales  CV: Regular rate and rhythm. No murmurs, rubs, or gallops.  Ext: No clubbing, cyanosis, edema.    Assessment & Plan:     31 y.o. female with the following -     1. Irregular menstrual cycle  Patient with irregular menstrual cycle, may have been triggered by COVID-19 infection versus thyroid disease.  Check updated labs.  Urinalysis and urine pregnancy in the office negative.  - POCT Urinalysis  - POC URINE PREGNANCY  - HCG QUANTITATIVE; Future    2. Hashimoto's disease  Chronic, on NP thyroid 90 mg daily.  Right-sided nodule felt on exam.  - US-THYROID; Future    3. " Screening for thyroid disorder  - TSH; Future  - FREE THYROXINE; Future    4. Screening cholesterol level  - Lipid Profile; Future    5. Diabetes mellitus screening  - Comp Metabolic Panel; Future    6. Encounter for vitamin deficiency screening  - VITAMIN D,25 HYDROXY; Future  - VITAMIN B12; Future    7. Screening for deficiency anemia  - CBC WITH DIFFERENTIAL; Future  - VITAMIN B12; Future    8. Screening examination for sexually transmitted disease  - Chlamydia/GC PCR Urine Or Swab; Future  - T.PALLIDUM AB EIA; Future  - HSV 1/2 IGG W/ TYPE SPECIFIC RFLX; Future  - HIV AG/AB COMBO ASSAY SCREENING; Future      Return for Annual physical, Pap smear.    Elizabeth Box PA-C ([place])  Physician Assistant Certified  [facility]    Please note that this dictation was created using voice recognition software. I have made every reasonable attempt to correct obvious errors, but I expect that there are errors of grammar and possibly content that I did not discover before finalizing the note.

## 2022-02-11 NOTE — ASSESSMENT & PLAN NOTE
Patient states that in December she took her birth control and skipped the placebo week and continued onto the next pack into January.  She can develop a COVID-19 infection early January.  At the end of her January birth control pack she took the placebo week and did not have a menstrual cycle so she stayed off of her birth control for 1 more week.  She restarted birth control pack 4 days ago and is still not bled.  Last time she had intercourse was early December.  She denies any pelvic pain, cramping, discharge.  She is requesting STD screening today, as she has had multiple partners in the past.

## 2022-02-11 NOTE — PATIENT INSTRUCTIONS
It was a pleasure meeting with you today at [facility]!    Your medical history/records and medications were reviewed today.     If you have any prescription refill requests, please send them via Aerob or discuss with your provider at the start of your office visits. Please allow 3-5 business days for lab and testing review and you will be contacted via Aerob with those results, or if advised to make a follow up appointment regarding those results, then please do so.     Once resulted, your lab/test/imaging results will show up automatically in your MyChart. Please wait for my interpretation and recommendations prior to viewing your results to avoid any unnecessary confusion or misinterpretation. I will address all of the lab values that I interpret as abnormal and message you accordingly on your MyChart. I will always send you a message about your results even if they are normal. If you do not hear back from me within 5-7 business days after completing your tests, then please send me a message on Bright Industryt so I can obtain your results (especially if you went to an outside lab or imaging center - [facility], [facility], etc).     If you have any additional questions or concerns beyond my interpretation of your results, please make an appointment with me to discuss in further detail.    Please only use the Aerob messaging system for questions regarding your most recent appointment or if advised to use otherwise (glucose or blood pressure reporting).     If you have any new problems or concerns, you must make an appointment to discuss. This includes any referral requests, lab requests (unless advised to notify me for pre-appt labs), medication side effects, or request for medication adjustments.       Thank you,    Elizabeth Box PA-C (Baker)  Physician Assistant Certified  [facility]

## 2022-02-12 DIAGNOSIS — Z11.3 SCREENING EXAMINATION FOR SEXUALLY TRANSMITTED DISEASE: ICD-10-CM

## 2022-03-07 ENCOUNTER — HOSPITAL ENCOUNTER (OUTPATIENT)
Facility: MEDICAL CENTER | Age: 32
End: 2022-03-07
Attending: PHYSICIAN ASSISTANT
Payer: COMMERCIAL

## 2022-03-07 ENCOUNTER — OFFICE VISIT (OUTPATIENT)
Dept: MEDICAL GROUP | Facility: IMAGING CENTER | Age: 32
End: 2022-03-07
Payer: COMMERCIAL

## 2022-03-07 VITALS
OXYGEN SATURATION: 100 % | TEMPERATURE: 98.3 F | SYSTOLIC BLOOD PRESSURE: 104 MMHG | HEART RATE: 94 BPM | WEIGHT: 138 LBS | HEIGHT: 65 IN | DIASTOLIC BLOOD PRESSURE: 64 MMHG | BODY MASS INDEX: 22.99 KG/M2

## 2022-03-07 DIAGNOSIS — N89.8 VAGINAL DISCHARGE: ICD-10-CM

## 2022-03-07 DIAGNOSIS — N30.01 ACUTE CYSTITIS WITH HEMATURIA: ICD-10-CM

## 2022-03-07 PROBLEM — R30.0 DYSURIA: Status: ACTIVE | Noted: 2022-03-07

## 2022-03-07 LAB
APPEARANCE UR: NORMAL
BILIRUB UR STRIP-MCNC: NEGATIVE MG/DL
C TRACH DNA GENITAL QL NAA+PROBE: NEGATIVE
CANDIDA DNA VAG QL PROBE+SIG AMP: NEGATIVE
COLOR UR AUTO: YELLOW
G VAGINALIS DNA VAG QL PROBE+SIG AMP: NEGATIVE
GLUCOSE UR STRIP.AUTO-MCNC: NEGATIVE MG/DL
KETONES UR STRIP.AUTO-MCNC: NEGATIVE MG/DL
LEUKOCYTE ESTERASE UR QL STRIP.AUTO: NORMAL
N GONORRHOEA DNA GENITAL QL NAA+PROBE: NEGATIVE
NITRITE UR QL STRIP.AUTO: POSITIVE
PH UR STRIP.AUTO: 6 [PH] (ref 5–8)
PROT UR QL STRIP: 100 MG/DL
RBC UR QL AUTO: NORMAL
SP GR UR STRIP.AUTO: 1.02
SPECIMEN SOURCE: NORMAL
T VAGINALIS DNA VAG QL PROBE+SIG AMP: NEGATIVE
UROBILINOGEN UR STRIP-MCNC: 0.2 MG/DL

## 2022-03-07 PROCEDURE — 87491 CHLMYD TRACH DNA AMP PROBE: CPT

## 2022-03-07 PROCEDURE — 87480 CANDIDA DNA DIR PROBE: CPT

## 2022-03-07 PROCEDURE — 87591 N.GONORRHOEAE DNA AMP PROB: CPT

## 2022-03-07 PROCEDURE — 87077 CULTURE AEROBIC IDENTIFY: CPT

## 2022-03-07 PROCEDURE — 81002 URINALYSIS NONAUTO W/O SCOPE: CPT | Performed by: PHYSICIAN ASSISTANT

## 2022-03-07 PROCEDURE — 99213 OFFICE O/P EST LOW 20 MIN: CPT | Mod: 25 | Performed by: PHYSICIAN ASSISTANT

## 2022-03-07 PROCEDURE — 87510 GARDNER VAG DNA DIR PROBE: CPT

## 2022-03-07 PROCEDURE — 87086 URINE CULTURE/COLONY COUNT: CPT

## 2022-03-07 PROCEDURE — 87186 SC STD MICRODIL/AGAR DIL: CPT | Mod: 91

## 2022-03-07 PROCEDURE — 87660 TRICHOMONAS VAGIN DIR PROBE: CPT

## 2022-03-07 RX ORDER — METRONIDAZOLE 500 MG/1
500 TABLET ORAL 2 TIMES DAILY
Qty: 14 TABLET | Refills: 0 | Status: SHIPPED | OUTPATIENT
Start: 2022-03-07 | End: 2022-03-14

## 2022-03-07 RX ORDER — FLUCONAZOLE 150 MG/1
TABLET ORAL
Qty: 2 TABLET | Refills: 0 | Status: SHIPPED
Start: 2022-03-07 | End: 2022-06-03

## 2022-03-07 RX ORDER — CEPHALEXIN 500 MG/1
500 CAPSULE ORAL 2 TIMES DAILY
Qty: 14 CAPSULE | Refills: 0 | Status: SHIPPED
Start: 2022-03-07 | End: 2022-03-10

## 2022-03-07 ASSESSMENT — PAIN SCALES - GENERAL: PAINLEVEL: 7=MODERATE-SEVERE PAIN

## 2022-03-07 NOTE — PROGRESS NOTES
Subjective:     CC:   Chief Complaint   Patient presents with   • Dysuria     Sx started Saturday morning   • UTI       HPI:   Farideh presents today to discuss:    Dysuria  Patient admits to burning with urination, frequency, urgency, vaginal discharge and itching for the past 2 days. She states that she has a new boyfriend and she developed the symptoms shortly after having intercourse. No pelvic pain or bleeding. Patient's last menstrual period was 02/24/2022 (within days).        Past Medical History:   Diagnosis Date   • Anxiety    • Chronic pain    • Depression    • GERD (gastroesophageal reflux disease)    • Panic disorder    • Thyroid disease    • Urinary tract infection      Social History     Tobacco Use   • Smoking status: Never Smoker   • Smokeless tobacco: Never Used   Vaping Use   • Vaping Use: Every day   Substance Use Topics   • Alcohol use: No   • Drug use: No     Comment: CBD use for bladder pain     Current Outpatient Medications Ordered in Epic   Medication Sig Dispense Refill   • cephALEXin (KEFLEX) 500 MG Cap Take 1 Capsule by mouth 2 times a day for 7 days. 14 Capsule 0   • fluconazole (DIFLUCAN) 150 MG tablet Take 1 tablet, then repeat in 72 hours if symptoms persist. 2 Tablet 0   • metroNIDAZOLE (FLAGYL) 500 MG Tab Take 1 Tablet by mouth 2 times a day for 7 days. 14 Tablet 0   • NITROFURANTOIN PO Take 100 mg by mouth. PRN after sex     • Ondansetron 4 MG FILM Take 4 mg by mouth every 6 hours as needed (nausea). 10 Each 0   • ibuprofen (MOTRIN) 200 MG Tab      • NP THYROID 90 MG Tab Take 1 Tablet by mouth every day. 90 Tablet 3   • drospirenone-ethinyl estradiol (RICHARD) 3-0.03 MG per tablet Take 1 Tablet by mouth every day. 84 Tablet 3   • omeprazole (PRILOSEC) 10 MG CAPSULE DELAYED RELEASE Take 1 Capsule by mouth two times a day may change times on alt/days. 180 Capsule 3   • hydrOXYzine HCl (ATARAX) 50 MG Tab Take 1 Tablet by mouth at bedtime. 90 Tablet 3   • amitriptyline (ELAVIL) 25 MG  "Tab Take 4 Tablets by mouth at bedtime as needed for Mild Pain. 360 Tablet 3   • fluticasone (FLONASE) 50 MCG/ACT nasal spray Administer 1 Spray into affected nostril(S) every day. 16 g 2   • estradiol (ESTRACE) 0.1 MG/GM vaginal cream VIC PEA SIZED AMT VAGINALLY HS 42.5 g 3   • Meth-Hyo-M Bl-Na Phos-Ph Sal (URIBEL) 118 MG Cap Take 1 Capsule by mouth. Take 1 capsule by mouth two times a day       No current [facility]-ordered facility-administered medications on file.     Bactrim [sulfamethoxazole w-trimethoprim]    ROS: see hpi  Gen: no fevers/chills  Pulm: no sob, no cough  CV: no chest pain, no palpitations, no edema  GI: no nausea/vomiting, no diarrhea  Skin: no rash    Objective:   Exam:  /64 (BP Location: Left arm, Patient Position: Sitting, BP Cuff Size: Adult)   Pulse 94   Temp 36.8 °C (98.3 °F) (Temporal)   Ht 1.651 m (5' 5\")   Wt 62.6 kg (138 lb)   LMP 02/24/2022 (Within Days)   SpO2 100%   BMI 22.96 kg/m²    Body mass index is 22.96 kg/m².    Gen: Alert and oriented, No apparent distress.  HEENT: Head atraumatic, normocephalic. Pupils equal and round.  Lungs: Normal effort, CTA bilaterally, no wheezes, rhonchi, or rales  CV: Regular rate and rhythm. No murmurs, rubs, or gallops.  Ext: No clubbing, cyanosis, edema.    Assessment & Plan:     31 y.o. female with the following -     1. Acute cystitis with hematuria  Acute, will treat with Keflex 500 mg twice a day for 7 days. Prescription for Diflucan given if yeast infection develops after antibiotics. Increase fluids. Prescription for Flagyl given to patient for presumed bacterial vaginosis as she has a history of recurrence related to intercourse. Do not drink alcohol while taking metronidazole. Future gynecology evaluation if she continues to have recurrent BV. Wearing condoms during intercourse will help reduce risk of STDs and infections.  - POCT Urinalysis  - cephALEXin (KEFLEX) 500 MG Cap; Take 1 Capsule by mouth 2 times a day for 7 days.  " Dispense: 14 Capsule; Refill: 0  - fluconazole (DIFLUCAN) 150 MG tablet; Take 1 tablet, then repeat in 72 hours if symptoms persist.  Dispense: 2 Tablet; Refill: 0  - metroNIDAZOLE (FLAGYL) 500 MG Tab; Take 1 Tablet by mouth 2 times a day for 7 days.  Dispense: 14 Tablet; Refill: 0  - VAGINAL PATHOGENS DNA PANEL; Future  - URINE CULTURE(NEW); Future    2. Vaginal discharge  - fluconazole (DIFLUCAN) 150 MG tablet; Take 1 tablet, then repeat in 72 hours if symptoms persist.  Dispense: 2 Tablet; Refill: 0  - metroNIDAZOLE (FLAGYL) 500 MG Tab; Take 1 Tablet by mouth 2 times a day for 7 days.  Dispense: 14 Tablet; Refill: 0  - Chlamydia & N.gonorrhoeae by PCR; Future    Return for Annual physical, Pap smear.    Elizabeth Box PA-C (Baker)  Physician Assistant Certified  [facility]    Please note that this dictation was created using voice recognition software. I have made every reasonable attempt to correct obvious errors, but I expect that there are errors of grammar and possibly content that I did not discover before finalizing the note.

## 2022-03-07 NOTE — LETTER
[facility] [place] [id number] [place] [address]     March 7, 2022    Patient: Farideh Cunha   YOB: 1990   Date of Visit: 3/7/2022       To Whom It May Concern:      Farideh Cunha was seen and treated in our department on 3/7/2022.     Sincerely,         Elizabeth Box P.A.-C.

## 2022-03-07 NOTE — ASSESSMENT & PLAN NOTE
Patient admits to burning with urination, frequency, urgency, vaginal discharge and itching for the past 2 days. She states that she has a new boyfriend and she developed the symptoms shortly after having intercourse. No pelvic pain or bleeding. Patient's last menstrual period was 02/24/2022 (within days).

## 2022-03-07 NOTE — PATIENT INSTRUCTIONS
It was a pleasure meeting with you today at [facility]!    Your medical history/records and medications were reviewed today.     If you have any prescription refill requests, please send them via JibJab or discuss with your provider at the start of your office visits. Please allow 3-5 business days for lab and testing review and you will be contacted via JibJab with those results, or if advised to make a follow up appointment regarding those results, then please do so.     Once resulted, your lab/test/imaging results will show up automatically in your MyChart. Please wait for my interpretation and recommendations prior to viewing your results to avoid any unnecessary confusion or misinterpretation. I will address all of the lab values that I interpret as abnormal and message you accordingly on your MyChart. I will always send you a message about your results even if they are normal. If you do not hear back from me within 5-7 business days after completing your tests, then please send me a message on Mambat so I can obtain your results (especially if you went to an outside lab or imaging center - [facility], [facility], etc).     If you have any additional questions or concerns beyond my interpretation of your results, please make an appointment with me to discuss in further detail.    Please only use the JibJab messaging system for questions regarding your most recent appointment or if advised to use otherwise (glucose or blood pressure reporting).     If you have any new problems or concerns, you must make an appointment to discuss. This includes any referral requests, lab requests (unless advised to notify me for pre-appt labs), medication side effects, or request for medication adjustments.       Thank you,    Elizabeth Box PA-C (Baker)  Physician Assistant Certified  [facility]

## 2022-03-09 LAB
BACTERIA UR CULT: ABNORMAL
SIGNIFICANT IND 70042: ABNORMAL
SITE SITE: ABNORMAL
SOURCE SOURCE: ABNORMAL

## 2022-03-10 DIAGNOSIS — N30.01 ACUTE CYSTITIS WITH HEMATURIA: ICD-10-CM

## 2022-03-10 RX ORDER — NITROFURANTOIN 25; 75 MG/1; MG/1
100 CAPSULE ORAL 2 TIMES DAILY
Qty: 14 CAPSULE | Refills: 0 | Status: SHIPPED | OUTPATIENT
Start: 2022-03-10 | End: 2022-03-17

## 2022-03-25 ENCOUNTER — HOSPITAL ENCOUNTER (OUTPATIENT)
Dept: LAB | Facility: MEDICAL CENTER | Age: 32
End: 2022-03-25
Attending: PHYSICIAN ASSISTANT
Payer: COMMERCIAL

## 2022-03-25 DIAGNOSIS — Z13.220 SCREENING CHOLESTEROL LEVEL: ICD-10-CM

## 2022-03-25 DIAGNOSIS — E06.3 HASHIMOTO'S DISEASE: ICD-10-CM

## 2022-03-25 DIAGNOSIS — Z13.0 SCREENING FOR DEFICIENCY ANEMIA: ICD-10-CM

## 2022-03-25 DIAGNOSIS — Z13.21 ENCOUNTER FOR VITAMIN DEFICIENCY SCREENING: ICD-10-CM

## 2022-03-25 DIAGNOSIS — Z11.3 SCREENING EXAMINATION FOR SEXUALLY TRANSMITTED DISEASE: ICD-10-CM

## 2022-03-25 DIAGNOSIS — Z13.1 DIABETES MELLITUS SCREENING: ICD-10-CM

## 2022-03-25 DIAGNOSIS — Z13.29 SCREENING FOR THYROID DISORDER: ICD-10-CM

## 2022-03-25 DIAGNOSIS — N92.6 IRREGULAR MENSTRUAL CYCLE: ICD-10-CM

## 2022-03-25 LAB
25(OH)D3 SERPL-MCNC: 59 NG/ML (ref 30–100)
ALBUMIN SERPL BCP-MCNC: 4.7 G/DL (ref 3.2–4.9)
ALBUMIN/GLOB SERPL: 2.1 G/DL
ALP SERPL-CCNC: 57 U/L (ref 30–99)
ALT SERPL-CCNC: 20 U/L (ref 2–50)
ANION GAP SERPL CALC-SCNC: 11 MMOL/L (ref 7–16)
AST SERPL-CCNC: 18 U/L (ref 12–45)
B-HCG SERPL-ACNC: <1 MIU/ML (ref 0–5)
BASOPHILS # BLD AUTO: 1 % (ref 0–1.8)
BASOPHILS # BLD: 0.05 K/UL (ref 0–0.12)
BILIRUB SERPL-MCNC: 0.6 MG/DL (ref 0.1–1.5)
BUN SERPL-MCNC: 10 MG/DL (ref 8–22)
CALCIUM SERPL-MCNC: 9.6 MG/DL (ref 8.5–10.5)
CHLORIDE SERPL-SCNC: 106 MMOL/L (ref 96–112)
CHOLEST SERPL-MCNC: 180 MG/DL (ref 100–199)
CO2 SERPL-SCNC: 26 MMOL/L (ref 20–33)
CREAT SERPL-MCNC: 0.7 MG/DL (ref 0.5–1.4)
EOSINOPHIL # BLD AUTO: 0.1 K/UL (ref 0–0.51)
EOSINOPHIL NFR BLD: 2 % (ref 0–6.9)
ERYTHROCYTE [DISTWIDTH] IN BLOOD BY AUTOMATED COUNT: 43.8 FL (ref 35.9–50)
FASTING STATUS PATIENT QL REPORTED: NORMAL
GFR SERPLBLD CREATININE-BSD FMLA CKD-EPI: 118 ML/MIN/1.73 M 2
GLOBULIN SER CALC-MCNC: 2.2 G/DL (ref 1.9–3.5)
GLUCOSE SERPL-MCNC: 76 MG/DL (ref 65–99)
HCT VFR BLD AUTO: 38.8 % (ref 37–47)
HDLC SERPL-MCNC: 84 MG/DL
HGB BLD-MCNC: 13.1 G/DL (ref 12–16)
HIV 1+2 AB+HIV1 P24 AG SERPL QL IA: NORMAL
IMM GRANULOCYTES # BLD AUTO: 0.01 K/UL (ref 0–0.11)
IMM GRANULOCYTES NFR BLD AUTO: 0.2 % (ref 0–0.9)
LDLC SERPL CALC-MCNC: 81 MG/DL
LYMPHOCYTES # BLD AUTO: 2.17 K/UL (ref 1–4.8)
LYMPHOCYTES NFR BLD: 42.9 % (ref 22–41)
MCH RBC QN AUTO: 29.8 PG (ref 27–33)
MCHC RBC AUTO-ENTMCNC: 33.8 G/DL (ref 33.6–35)
MCV RBC AUTO: 88.4 FL (ref 81.4–97.8)
MONOCYTES # BLD AUTO: 0.35 K/UL (ref 0–0.85)
MONOCYTES NFR BLD AUTO: 6.9 % (ref 0–13.4)
NEUTROPHILS # BLD AUTO: 2.38 K/UL (ref 2–7.15)
NEUTROPHILS NFR BLD: 47 % (ref 44–72)
NRBC # BLD AUTO: 0 K/UL
NRBC BLD-RTO: 0 /100 WBC
PLATELET # BLD AUTO: 263 K/UL (ref 164–446)
PMV BLD AUTO: 9 FL (ref 9–12.9)
POTASSIUM SERPL-SCNC: 4.3 MMOL/L (ref 3.6–5.5)
PROT SERPL-MCNC: 6.9 G/DL (ref 6–8.2)
RBC # BLD AUTO: 4.39 M/UL (ref 4.2–5.4)
SODIUM SERPL-SCNC: 143 MMOL/L (ref 135–145)
T PALLIDUM AB SER QL IA: NORMAL
T4 FREE SERPL-MCNC: 1.01 NG/DL (ref 0.93–1.7)
TRIGL SERPL-MCNC: 77 MG/DL (ref 0–149)
TSH SERPL DL<=0.005 MIU/L-ACNC: 0.93 UIU/ML (ref 0.38–5.33)
VIT B12 SERPL-MCNC: 814 PG/ML (ref 211–911)
WBC # BLD AUTO: 5.1 K/UL (ref 4.8–10.8)

## 2022-03-25 PROCEDURE — 86780 TREPONEMA PALLIDUM: CPT

## 2022-03-25 PROCEDURE — 84439 ASSAY OF FREE THYROXINE: CPT

## 2022-03-25 PROCEDURE — 82607 VITAMIN B-12: CPT

## 2022-03-25 PROCEDURE — 85025 COMPLETE CBC W/AUTO DIFF WBC: CPT

## 2022-03-25 PROCEDURE — 80053 COMPREHEN METABOLIC PANEL: CPT

## 2022-03-25 PROCEDURE — 87389 HIV-1 AG W/HIV-1&-2 AB AG IA: CPT

## 2022-03-25 PROCEDURE — 86694 HERPES SIMPLEX NES ANTBDY: CPT

## 2022-03-25 PROCEDURE — 36415 COLL VENOUS BLD VENIPUNCTURE: CPT

## 2022-03-25 PROCEDURE — 82306 VITAMIN D 25 HYDROXY: CPT

## 2022-03-25 PROCEDURE — 80061 LIPID PANEL: CPT

## 2022-03-25 PROCEDURE — 84443 ASSAY THYROID STIM HORMONE: CPT

## 2022-03-25 PROCEDURE — 84702 CHORIONIC GONADOTROPIN TEST: CPT

## 2022-03-27 LAB — HSV1+2 IGG SER IA-ACNC: 0.25 IV

## 2022-04-19 ENCOUNTER — HOSPITAL ENCOUNTER (OUTPATIENT)
Facility: MEDICAL CENTER | Age: 32
End: 2022-04-19
Attending: UROLOGY
Payer: COMMERCIAL

## 2022-04-19 PROCEDURE — 87086 URINE CULTURE/COLONY COUNT: CPT

## 2022-04-21 LAB
BACTERIA UR CULT: NORMAL
SIGNIFICANT IND 70042: NORMAL
SITE SITE: NORMAL
SOURCE SOURCE: NORMAL

## 2022-06-03 ENCOUNTER — HOSPITAL ENCOUNTER (OUTPATIENT)
Facility: MEDICAL CENTER | Age: 32
End: 2022-06-03
Attending: PHYSICIAN ASSISTANT
Payer: COMMERCIAL

## 2022-06-03 ENCOUNTER — OFFICE VISIT (OUTPATIENT)
Dept: MEDICAL GROUP | Facility: IMAGING CENTER | Age: 32
End: 2022-06-03
Payer: COMMERCIAL

## 2022-06-03 VITALS
WEIGHT: 143 LBS | SYSTOLIC BLOOD PRESSURE: 102 MMHG | OXYGEN SATURATION: 98 % | TEMPERATURE: 98.4 F | HEIGHT: 65 IN | HEART RATE: 76 BPM | DIASTOLIC BLOOD PRESSURE: 60 MMHG | BODY MASS INDEX: 23.82 KG/M2

## 2022-06-03 DIAGNOSIS — Z78.9 USES BIRTH CONTROL: ICD-10-CM

## 2022-06-03 DIAGNOSIS — E06.3 HASHIMOTO'S DISEASE: ICD-10-CM

## 2022-06-03 DIAGNOSIS — R39.82 CHRONIC URINARY BLADDER PAIN: ICD-10-CM

## 2022-06-03 DIAGNOSIS — R19.8 EDGE OF LIVER PALPABLE BELOW RIGHT COSTAL MARGIN: ICD-10-CM

## 2022-06-03 DIAGNOSIS — Z01.419 ENCOUNTER FOR GYNECOLOGICAL EXAMINATION: ICD-10-CM

## 2022-06-03 DIAGNOSIS — G47.09 OTHER INSOMNIA: ICD-10-CM

## 2022-06-03 DIAGNOSIS — Z12.4 SCREENING FOR CERVICAL CANCER: ICD-10-CM

## 2022-06-03 DIAGNOSIS — Z11.51 SCREENING FOR HPV (HUMAN PAPILLOMAVIRUS): ICD-10-CM

## 2022-06-03 DIAGNOSIS — F41.0 GENERALIZED ANXIETY DISORDER WITH PANIC ATTACKS: ICD-10-CM

## 2022-06-03 DIAGNOSIS — F41.1 GENERALIZED ANXIETY DISORDER WITH PANIC ATTACKS: ICD-10-CM

## 2022-06-03 PROBLEM — F43.23 ADJUSTMENT DISORDER WITH MIXED ANXIETY AND DEPRESSED MOOD: Status: RESOLVED | Noted: 2020-12-02 | Resolved: 2022-06-03

## 2022-06-03 PROBLEM — J02.9 SORE THROAT: Status: RESOLVED | Noted: 2021-12-23 | Resolved: 2022-06-03

## 2022-06-03 PROBLEM — Z63.5 DISRUPTION OF FAMILY BY SEPARATION AND DIVORCE: Status: RESOLVED | Noted: 2020-11-18 | Resolved: 2022-06-03

## 2022-06-03 PROBLEM — R05.9 COUGH: Status: RESOLVED | Noted: 2021-11-08 | Resolved: 2022-06-03

## 2022-06-03 PROBLEM — G89.29 CHRONIC MIDLINE LOW BACK PAIN WITH RIGHT-SIDED SCIATICA: Status: RESOLVED | Noted: 2018-11-01 | Resolved: 2022-06-03

## 2022-06-03 PROBLEM — F33.2 SEVERE EPISODE OF RECURRENT MAJOR DEPRESSIVE DISORDER, WITHOUT PSYCHOTIC FEATURES (HCC): Status: RESOLVED | Noted: 2020-10-26 | Resolved: 2022-06-03

## 2022-06-03 PROBLEM — N92.6 IRREGULAR MENSTRUAL CYCLE: Status: RESOLVED | Noted: 2022-02-11 | Resolved: 2022-06-03

## 2022-06-03 PROBLEM — R30.0 DYSURIA: Status: RESOLVED | Noted: 2022-03-07 | Resolved: 2022-06-03

## 2022-06-03 PROBLEM — M54.41 CHRONIC MIDLINE LOW BACK PAIN WITH RIGHT-SIDED SCIATICA: Status: RESOLVED | Noted: 2018-11-01 | Resolved: 2022-06-03

## 2022-06-03 PROBLEM — E66.9 OBESITY (BMI 30-39.9): Status: RESOLVED | Noted: 2018-09-13 | Resolved: 2022-06-03

## 2022-06-03 PROBLEM — N92.1 MENORRHAGIA WITH IRREGULAR CYCLE: Status: RESOLVED | Noted: 2018-09-13 | Resolved: 2022-06-03

## 2022-06-03 PROBLEM — F41.9 ANXIETY: Status: RESOLVED | Noted: 2020-04-22 | Resolved: 2022-06-03

## 2022-06-03 PROCEDURE — 87624 HPV HI-RISK TYP POOLED RSLT: CPT

## 2022-06-03 PROCEDURE — 88175 CYTOPATH C/V AUTO FLUID REDO: CPT

## 2022-06-03 PROCEDURE — 99395 PREV VISIT EST AGE 18-39: CPT | Performed by: PHYSICIAN ASSISTANT

## 2022-06-03 RX ORDER — DROSPIRENONE AND ETHINYL ESTRADIOL 0.03MG-3MG
1 KIT ORAL
Qty: 84 TABLET | Refills: 3 | Status: SHIPPED | OUTPATIENT
Start: 2022-06-03 | End: 2022-12-01

## 2022-06-03 RX ORDER — HYDROXYZINE HYDROCHLORIDE 10 MG/1
TABLET, FILM COATED ORAL
COMMUNITY
Start: 2022-03-28 | End: 2022-06-03

## 2022-06-03 RX ORDER — LEVOTHYROXINE, LIOTHYRONINE 57; 13.5 UG/1; UG/1
90 TABLET ORAL
Qty: 90 TABLET | Refills: 3 | Status: SHIPPED | OUTPATIENT
Start: 2022-06-03 | End: 2022-12-01

## 2022-06-03 RX ORDER — METHENAMINE HIPPURATE 1000 MG/1
1 TABLET ORAL 2 TIMES DAILY
Qty: 180 TABLET | Refills: 1
Start: 2022-06-03 | End: 2022-09-01

## 2022-06-03 RX ORDER — HYDROXYZINE 50 MG/1
50 TABLET, FILM COATED ORAL
Qty: 90 TABLET | Refills: 3 | Status: SHIPPED | OUTPATIENT
Start: 2022-06-03 | End: 2022-12-01

## 2022-06-03 RX ORDER — METHENAMINE HIPPURATE 1000 MG/1
TABLET ORAL
COMMUNITY
Start: 2022-06-02 | End: 2022-06-03 | Stop reason: SDUPTHER

## 2022-06-03 ASSESSMENT — FIBROSIS 4 INDEX: FIB4 SCORE: 0.49

## 2022-06-03 ASSESSMENT — PAIN SCALES - GENERAL: PAINLEVEL: NO PAIN

## 2022-06-03 NOTE — ASSESSMENT & PLAN NOTE
Chronic, on NP thyroid 90 mg daily.  Last labs in March 2022.  Patient has not scheduled thyroid ultrasound yet.  She does admit to some fatigue and brain fog recently.  She is requesting updated labs.

## 2022-06-03 NOTE — PATIENT INSTRUCTIONS
It was a pleasure meeting with you today at [facility]!    Your medical history/records and medications were reviewed today.     Please review my practice information below:    If you have any prescription refill requests, please send them via Pushkartt or discuss with your provider at the start of your office visits. Please allow 3-5 business days for lab and testing review and you will be contacted via SafetyCertified with those results, or if advised to make a follow up appointment regarding those results, then please do so.     Once resulted, your lab/test/imaging results will show up automatically in your MyChart. Please wait for my interpretation and recommendations prior to viewing your results to avoid any unnecessary confusion or misinterpretation. I will address all of the lab values that I interpret as abnormal and message you accordingly on your MyChart. I will always send you a message about your results even if they are normal. If you do not hear back from me within 5-7 business days after completing your tests, then please send me a message on Pushkartt so I can obtain your results (especially if you went to an outside lab or imaging center - [facility], [facility], etc).     If you have any additional questions or concerns beyond my interpretation of your results, please make an appointment with me to discuss in further detail.    Please only use the SafetyCertified messaging system for questions regarding your most recent appointment or if advised to use otherwise (glucose or blood pressure reporting).     If you have any new problems or concerns, you must make an appointment to discuss. This includes any referral requests, lab requests (unless advised to notify me for pre-appt labs), medication side effects, or request for medication adjustments.     Please arrive 15 minutes prior to your appointment time to complete your check-in and intake with the medical assistant.      Thank you,    Elizabeth Kennedy) Isauro  CHANDAN  Physician Assistant Certified  [facility]

## 2022-06-03 NOTE — PROGRESS NOTES
Subjective:     CC:   Chief Complaint   Patient presents with   • Annual Exam   • Lab Results   • Gynecologic Exam       HPI:   Farideh Cunha is a 32 y.o. patient who presents for annual gynecological exam. She is feeling well and denies any complaints.    Ob-Gyn/ History:    Patient has GYN provider: no  /Para:    Patient's last menstrual period was 2022 (exact date).  Last pap smear: 4 years ago  History of abnormal pap smears: no  Current Contraceptive Method:  OCP.   Currently sexually active: yes  Periods regular, 4-5 days  Cramping is moderate      has a past medical history of Anxiety, Chronic pain, Depression, GERD (gastroesophageal reflux disease), Panic disorder, Thyroid disease, and Urinary tract infection.   has a past surgical history that includes cholecystectomy and mammoplasty augmentation.  Family History   Problem Relation Age of Onset   • Hyperlipidemia Mother    • Anxiety disorder Mother    • Alcohol abuse Mother    • Depression Father    • Diabetes Maternal Grandfather    • Diabetes Paternal Grandmother    • Diabetes Paternal Grandfather    • Cancer Neg Hx    • Heart Disease Neg Hx    • Stroke Neg Hx    • Alcohol/Drug Neg Hx      Social History     Socioeconomic History   • Marital status: Single     Spouse name: Not on file   • Number of children: Not on file   • Years of education: Not on file   • Highest education level: Not on file   Occupational History   • Not on file   Tobacco Use   • Smoking status: Never Smoker   • Smokeless tobacco: Never Used   Vaping Use   • Vaping Use: Every day   Substance and Sexual Activity   • Alcohol use: No   • Drug use: No     Comment: CBD use for bladder pain   • Sexual activity: Yes     Partners: Male     Birth control/protection: Pill   Other Topics Concern   • Not on file   Social History Narrative   • Not on file     Social Determinants of Health     Financial Resource Strain: Not on file   Food Insecurity: Not on file    Transportation Needs: Not on file   Physical Activity: Not on file   Stress: Not on file   Social Connections: Not on file   Intimate Partner Violence: Not on file   Housing Stability: Not on file     Social History     Social History Narrative   • Not on file     Patient Active Problem List    Diagnosis Date Noted   • Vaginal discharge 11/22/2021   • Other fatigue 11/08/2021   • Other insomnia 11/08/2021   • Generalized anxiety disorder with panic attacks 11/08/2021   • Chronic urinary bladder pain 11/08/2021   • Hashimoto's disease 09/13/2018   • GERD (gastroesophageal reflux disease) 09/13/2018     Current Outpatient Medications   Medication Sig Dispense Refill   • NP THYROID 90 MG Tab Take 1 Tablet by mouth every day. 90 Tablet 3   • drospirenone-ethinyl estradiol (RICHARD) 3-0.03 MG per tablet Take 1 Tablet by mouth every day. 84 Tablet 3   • hydrOXYzine HCl (ATARAX) 50 MG Tab Take 1 Tablet by mouth at bedtime. 90 Tablet 3   • methenamine hip (HIPPREX) 1 GM Tab Take 1 Tablet by mouth 2 times a day for 90 days. 180 Tablet 1   • Ondansetron 4 MG FILM Take 4 mg by mouth every 6 hours as needed (nausea). 10 Each 0   • ibuprofen (MOTRIN) 200 MG Tab      • omeprazole (PRILOSEC) 10 MG CAPSULE DELAYED RELEASE Take 1 Capsule by mouth two times a day may change times on alt/days. 180 Capsule 3   • amitriptyline (ELAVIL) 25 MG Tab Take 4 Tablets by mouth at bedtime as needed for Mild Pain. 360 Tablet 3   • fluticasone (FLONASE) 50 MCG/ACT nasal spray Administer 1 Spray into affected nostril(S) every day. 16 g 2   • estradiol (ESTRACE) 0.1 MG/GM vaginal cream VIC PEA SIZED AMT VAGINALLY HS 42.5 g 3   • Meth-Hyo-M Bl-Na Phos-Ph Sal (URIBEL) 118 MG Cap Take 1 Capsule by mouth. Take 1 capsule by mouth two times a day       No current facility-administered medications for this visit.     Allergies   Allergen Reactions   • Bactrim [Sulfamethoxazole W-Trimethoprim] Swelling      swelling        Review of Systems  "  Constitutional: Negative for fever, chills and malaise/fatigue.    Respiratory: Negative for cough and shortness of breath.  Cardiovascular: Negative for chest pain, palpitations, or leg swelling.   Gastrointestinal: Negative for nausea, vomiting, abdominal pain and diarrhea.   Genitourinary: Negative for dysuria and hematuria.   Skin: Negative for rash.    Psychiatric/Behavioral: Negative for depression.      Objective:     /60 (BP Location: Left arm, Patient Position: Sitting, BP Cuff Size: Adult)   Pulse 76   Temp 36.9 °C (98.4 °F) (Temporal)   Ht 1.651 m (5' 5\")   Wt 64.9 kg (143 lb)   LMP 05/05/2022 (Exact Date)   SpO2 98%   BMI 23.80 kg/m²   Body mass index is 23.8 kg/m².  Wt Readings from Last 4 Encounters:   06/03/22 64.9 kg (143 lb)   03/07/22 62.6 kg (138 lb)   02/11/22 65.8 kg (145 lb)   01/10/22 67.1 kg (148 lb)       Physical Exam:  Constitutional: Well-developed and well-nourished. Not diaphoretic. No distress.   Skin: Skin is warm and dry. No rash noted.  Head: Atraumatic without lesions.  Neck: Supple, trachea midline. Normal range of motion. No thyromegaly present. No lymphadenopathy--cervical or supraclavicular.  Cardiovascular: Regular rate and rhythm, S1 and S2 without murmur, rubs, or gallops.  Lungs: Normal inspiratory effort, CTA bilaterally, no wheezes/rhonchi/rales    Breast: Breasts examined seated and supine. No skin changes, peau d'orange or nipple retraction. No discharge. No axillary or supraclavicular adenopathy. No masses or nodularity palpable. +implants B/L  Abdomen: Soft, non tender, and without distention. Active bowel sounds in all four quadrants. No rebound, guarding, masses.  Liver edge felt on inspiration.    :Perineum and external genitalia normal without rash.   Vagina with normal and physiologic discharge.   Cervix without visible lesions or discharge.   Bimanual exam without adnexal masses or cervical motion tenderness.    Extremities: No cyanosis, " clubbing, erythema, nor edema. Distal pulses intact and symmetric.     A chaperone was offered to the patient during today's exam. Chaperone name: Jerri LANDERS was present.    Assessment and Plan:     1. Encounter for gynecological examination  - Thinprep Pap with HPV; Future    2. Screening for cervical cancer  - Thinprep Pap with HPV; Future    3. Screening for HPV (human papillomavirus)  - Thinprep Pap with HPV; Future    4. Hashimoto's disease  Chronic, controlled on NP thyroid 90 mg daily.  Check updated labs due to brain fog and fatigue.  - NP THYROID 90 MG Tab; Take 1 Tablet by mouth every day.  Dispense: 90 Tablet; Refill: 3  - TSH; Future  - FREE THYROXINE; Future  - TRIIDOTHYRONINE; Future    5. Other insomnia  Chronic, managed with hydroxyzine.  Refill today.  - hydrOXYzine HCl (ATARAX) 50 MG Tab; Take 1 Tablet by mouth at bedtime.  Dispense: 90 Tablet; Refill: 3    6. Generalized anxiety disorder with panic attacks  Chronic, managed with hydroxyzine.  Refill today.  - hydrOXYzine HCl (ATARAX) 50 MG Tab; Take 1 Tablet by mouth at bedtime.  Dispense: 90 Tablet; Refill: 3    7. Chronic urinary bladder pain  Chronic, managed by urology.  - methenamine hip (HIPPREX) 1 GM Tab; Take 1 Tablet by mouth 2 times a day for 90 days.  Dispense: 180 Tablet; Refill: 1    8. Edge of liver palpable below right costal margin  May be related to fatty liver, patient previously 100 pounds heavier.  - US-ABDOMEN COMPLETE SURVEY; Future    9. Uses birth control  - drospirenone-ethinyl estradiol (RICHARD) 3-0.03 MG per tablet; Take 1 Tablet by mouth every day.  Dispense: 84 Tablet; Refill: 3    Follow-up: Return for Will notify patient to follow-up pending tests.    Elizabeth Box PA-C (Baker)  Physician Assistant Certified  [facility]

## 2022-06-06 LAB
CYTOLOGY REG CYTOL: ABNORMAL
HPV HR 12 DNA CVX QL NAA+PROBE: POSITIVE
HPV16 DNA SPEC QL NAA+PROBE: NEGATIVE
HPV18 DNA SPEC QL NAA+PROBE: NEGATIVE
SPECIMEN SOURCE: ABNORMAL

## 2022-06-07 DIAGNOSIS — R87.612 LGSIL OF CERVIX OF UNDETERMINED SIGNIFICANCE: ICD-10-CM

## 2022-06-07 DIAGNOSIS — B97.7 HPV (HUMAN PAPILLOMA VIRUS) INFECTION: ICD-10-CM

## 2022-06-13 ENCOUNTER — OFFICE VISIT (OUTPATIENT)
Dept: MEDICAL GROUP | Facility: IMAGING CENTER | Age: 32
End: 2022-06-13
Payer: COMMERCIAL

## 2022-06-13 VITALS
HEIGHT: 65 IN | WEIGHT: 144 LBS | HEART RATE: 88 BPM | OXYGEN SATURATION: 100 % | SYSTOLIC BLOOD PRESSURE: 104 MMHG | TEMPERATURE: 98.7 F | BODY MASS INDEX: 23.99 KG/M2 | DIASTOLIC BLOOD PRESSURE: 64 MMHG

## 2022-06-13 DIAGNOSIS — Z23 NEED FOR VACCINATION: ICD-10-CM

## 2022-06-13 DIAGNOSIS — R87.612 LGSIL OF CERVIX OF UNDETERMINED SIGNIFICANCE: ICD-10-CM

## 2022-06-13 DIAGNOSIS — B97.7 HPV (HUMAN PAPILLOMA VIRUS) INFECTION: ICD-10-CM

## 2022-06-13 DIAGNOSIS — G47.09 OTHER INSOMNIA: ICD-10-CM

## 2022-06-13 PROCEDURE — 90651 9VHPV VACCINE 2/3 DOSE IM: CPT | Performed by: PHYSICIAN ASSISTANT

## 2022-06-13 PROCEDURE — 90471 IMMUNIZATION ADMIN: CPT | Performed by: PHYSICIAN ASSISTANT

## 2022-06-13 PROCEDURE — 99214 OFFICE O/P EST MOD 30 MIN: CPT | Mod: 25 | Performed by: PHYSICIAN ASSISTANT

## 2022-06-13 RX ORDER — TRAZODONE HYDROCHLORIDE 50 MG/1
50 TABLET ORAL NIGHTLY PRN
Qty: 30 TABLET | Refills: 0 | Status: SHIPPED | OUTPATIENT
Start: 2022-06-13 | End: 2022-12-01

## 2022-06-13 ASSESSMENT — PAIN SCALES - GENERAL: PAINLEVEL: NO PAIN

## 2022-06-13 ASSESSMENT — PATIENT HEALTH QUESTIONNAIRE - PHQ9: CLINICAL INTERPRETATION OF PHQ2 SCORE: 0

## 2022-06-13 ASSESSMENT — FIBROSIS 4 INDEX: FIB4 SCORE: 0.49

## 2022-06-13 NOTE — LETTER
[facility] [place] [id number] [place] [address]     June 13, 2022    Patient: Farideh Cunha   YOB: 1990   Date of Visit: 6/13/2022       To Whom It May Concern:      Farideh Cunha was seen and treated in our department on 6/13/2022.       Sincerely,         Elizabeth Box P.A.-C.

## 2022-06-13 NOTE — ASSESSMENT & PLAN NOTE
Patient's recent Pap smear showed LSIL and HPV other positivity.  She does have an appointment with gynecology next week and needs an updated referral.  She was  for 15 years and then was single over the past year and had multiple sexual partners.  No partners with known genital warts.  No previous history of HPV.  No history of Gardasil vaccination.

## 2022-06-13 NOTE — LETTER
June 13, 2022         Patient: Farideh Cunha   YOB: 1990   Date of Visit: 6/13/2022           To Whom it May Concern:    Farideh Cunha was seen in my clinic on 6/13/2022. Please excuse her absence from work to attend this appointment.     If you have any questions or concerns, please don't hesitate to call.        Sincerely,           Elizabeth Box P.A.-C.  Electronically Signed      immune

## 2022-06-13 NOTE — PROGRESS NOTES
Subjective:     CC:   Chief Complaint   Patient presents with   • Lab Results     Pap smear       HPI:   Farideh presents today to discuss:    LGSIL of cervix of undetermined significance  Patient's recent Pap smear showed LSIL and HPV other positivity.  She does have an appointment with gynecology next week and needs an updated referral.  She was  for 15 years and then was single over the past year and had multiple sexual partners.  No partners with known genital warts.  No previous history of HPV.  No history of Gardasil vaccination.    Other insomnia  Patient admits to difficulty falling and staying asleep.  She has been using amitriptyline as needed up to 4-5 nights a week.  She is interested in trying different agent as she feels very tired during the day.  She has been more stressed and anxious due to her Pap smear results.      Past Medical History:   Diagnosis Date   • Anxiety    • Chronic pain    • Depression    • GERD (gastroesophageal reflux disease)    • Panic disorder    • Thyroid disease    • Urinary tract infection      Family History   Problem Relation Age of Onset   • Hyperlipidemia Mother    • Anxiety disorder Mother    • Alcohol abuse Mother    • Depression Father    • Diabetes Maternal Grandfather    • Diabetes Paternal Grandmother    • Diabetes Paternal Grandfather    • Cancer Neg Hx    • Heart Disease Neg Hx    • Stroke Neg Hx    • Alcohol/Drug Neg Hx      Past Surgical History:   Procedure Laterality Date   • CHOLECYSTECTOMY     • MAMMOPLASTY AUGMENTATION       Social History     Tobacco Use   • Smoking status: Never Smoker   • Smokeless tobacco: Never Used   Vaping Use   • Vaping Use: Every day   Substance Use Topics   • Alcohol use: No   • Drug use: No     Comment: CBD use for bladder pain     Social History     Social History Narrative   • Not on file     Current Outpatient Medications Ordered in Epic   Medication Sig Dispense Refill   • traZODone (DESYREL) 50 MG Tab Take 1 Tablet by  "mouth at bedtime as needed for Sleep. 30 Tablet 0   • NP THYROID 90 MG Tab Take 1 Tablet by mouth every day. 90 Tablet 3   • drospirenone-ethinyl estradiol (RICHARD) 3-0.03 MG per tablet Take 1 Tablet by mouth every day. 84 Tablet 3   • hydrOXYzine HCl (ATARAX) 50 MG Tab Take 1 Tablet by mouth at bedtime. 90 Tablet 3   • methenamine hip (HIPPREX) 1 GM Tab Take 1 Tablet by mouth 2 times a day for 90 days. 180 Tablet 1   • Ondansetron 4 MG FILM Take 4 mg by mouth every 6 hours as needed (nausea). 10 Each 0   • ibuprofen (MOTRIN) 200 MG Tab      • omeprazole (PRILOSEC) 10 MG CAPSULE DELAYED RELEASE Take 1 Capsule by mouth two times a day may change times on alt/days. 180 Capsule 3   • fluticasone (FLONASE) 50 MCG/ACT nasal spray Administer 1 Spray into affected nostril(S) every day. 16 g 2   • estradiol (ESTRACE) 0.1 MG/GM vaginal cream VIC PEA SIZED AMT VAGINALLY HS 42.5 g 3   • Meth-Hyo-M Bl-Na Phos-Ph Sal (URIBEL) 118 MG Cap Take 1 Capsule by mouth. Take 1 capsule by mouth two times a day       No current [facility]-ordered facility-administered medications on file.     Bactrim [sulfamethoxazole w-trimethoprim]      ROS: see hpi  Gen: no fevers/chills  Pulm: no sob, no cough  CV: no chest pain, no palpitations, no edema  GI: no nausea/vomiting, no diarrhea  Skin: no rash    Objective:   Exam:  /64 (BP Location: Left arm, Patient Position: Sitting, BP Cuff Size: Adult)   Pulse 88   Temp 37.1 °C (98.7 °F) (Temporal)   Ht 1.651 m (5' 5\")   Wt 65.3 kg (144 lb)   LMP 06/11/2022 (Exact Date)   SpO2 100%   BMI 23.96 kg/m²    Body mass index is 23.96 kg/m².    Gen: Alert and oriented, No apparent distress.  HEENT: Head atraumatic, normocephalic. Pupils equal and round.  Ext: No clubbing, cyanosis, edema.    Assessment & Plan:     32 y.o. female with the following -     1. LGSIL of cervix of undetermined significance  Await gynecology evaluation for colposcopy.  - Referral to Gynecology    2. HPV (human papilloma " virus) infection  Discussed the clinical course of HPV and the likelihood of reactivation from previous exposure rather than new exposure.  - Referral to Gynecology    3. Other insomnia  Chronic, uncontrolled.  We will stop amitriptyline and start trazodone 50 mg nightly.  - traZODone (DESYREL) 50 MG Tab; Take 1 Tablet by mouth at bedtime as needed for Sleep.  Dispense: 30 Tablet; Refill: 0    4. Need for vaccination  Shared decision-making for Gardasil vaccination.  Due to history of multiple sexual partners will start Gardasil vaccination series to reduce risk of exposure to further HPV strains.  Will initiate 3 dose series.  - Gardasil 9    Return if symptoms worsen or fail to improve.    Elizabeth Box PA-C ([place])  Physician Assistant Certified  [facility]    Please note that this dictation was created using voice recognition software. I have made every reasonable attempt to correct obvious errors, but I expect that there are errors of grammar and possibly content that I did not discover before finalizing the note.

## 2022-06-13 NOTE — ASSESSMENT & PLAN NOTE
Patient admits to difficulty falling and staying asleep.  She has been using amitriptyline as needed up to 4-5 nights a week.  She is interested in trying different agent as she feels very tired during the day.  She has been more stressed and anxious due to her Pap smear results.

## 2022-06-17 ENCOUNTER — PRE-ADMISSION TESTING (OUTPATIENT)
Dept: ADMISSIONS | Facility: MEDICAL CENTER | Age: 32
End: 2022-06-17
Attending: OTOLARYNGOLOGY
Payer: COMMERCIAL

## 2022-06-20 ENCOUNTER — HOSPITAL ENCOUNTER (OUTPATIENT)
Facility: MEDICAL CENTER | Age: 32
End: 2022-06-20
Attending: UROLOGY
Payer: COMMERCIAL

## 2022-06-20 PROCEDURE — 87086 URINE CULTURE/COLONY COUNT: CPT

## 2022-06-22 LAB
BACTERIA UR CULT: NORMAL
SIGNIFICANT IND 70042: NORMAL
SITE SITE: NORMAL
SOURCE SOURCE: NORMAL

## 2022-07-05 ENCOUNTER — ANESTHESIA EVENT (OUTPATIENT)
Dept: SURGERY | Facility: MEDICAL CENTER | Age: 32
End: 2022-07-05
Payer: COMMERCIAL

## 2022-07-05 ENCOUNTER — PHARMACY VISIT (OUTPATIENT)
Dept: PHARMACY | Facility: MEDICAL CENTER | Age: 32
End: 2022-07-05
Payer: COMMERCIAL

## 2022-07-05 ENCOUNTER — HOSPITAL ENCOUNTER (OUTPATIENT)
Facility: MEDICAL CENTER | Age: 32
End: 2022-07-05
Attending: OTOLARYNGOLOGY | Admitting: OTOLARYNGOLOGY
Payer: COMMERCIAL

## 2022-07-05 ENCOUNTER — ANESTHESIA (OUTPATIENT)
Dept: SURGERY | Facility: MEDICAL CENTER | Age: 32
End: 2022-07-05
Payer: COMMERCIAL

## 2022-07-05 VITALS
HEART RATE: 95 BPM | WEIGHT: 139.77 LBS | OXYGEN SATURATION: 96 % | RESPIRATION RATE: 20 BRPM | TEMPERATURE: 97.4 F | DIASTOLIC BLOOD PRESSURE: 65 MMHG | SYSTOLIC BLOOD PRESSURE: 107 MMHG | HEIGHT: 65 IN | BODY MASS INDEX: 23.29 KG/M2

## 2022-07-05 DIAGNOSIS — G89.18 ACUTE POST-OPERATIVE PAIN: ICD-10-CM

## 2022-07-05 LAB
HCG UR QL: NEGATIVE
PATHOLOGY CONSULT NOTE: NORMAL

## 2022-07-05 PROCEDURE — 81025 URINE PREGNANCY TEST: CPT

## 2022-07-05 PROCEDURE — 700105 HCHG RX REV CODE 258: Performed by: OTOLARYNGOLOGY

## 2022-07-05 PROCEDURE — 700101 HCHG RX REV CODE 250: Performed by: ANESTHESIOLOGY

## 2022-07-05 PROCEDURE — 160035 HCHG PACU - 1ST 60 MINS PHASE I: Performed by: OTOLARYNGOLOGY

## 2022-07-05 PROCEDURE — 160025 RECOVERY II MINUTES (STATS): Performed by: OTOLARYNGOLOGY

## 2022-07-05 PROCEDURE — 700111 HCHG RX REV CODE 636 W/ 250 OVERRIDE (IP): Performed by: ANESTHESIOLOGY

## 2022-07-05 PROCEDURE — 160027 HCHG SURGERY MINUTES - 1ST 30 MINS LEVEL 2: Performed by: OTOLARYNGOLOGY

## 2022-07-05 PROCEDURE — 700111 HCHG RX REV CODE 636 W/ 250 OVERRIDE (IP)

## 2022-07-05 PROCEDURE — 88304 TISSUE EXAM BY PATHOLOGIST: CPT | Mod: 59

## 2022-07-05 PROCEDURE — 160038 HCHG SURGERY MINUTES - EA ADDL 1 MIN LEVEL 2: Performed by: OTOLARYNGOLOGY

## 2022-07-05 PROCEDURE — A9270 NON-COVERED ITEM OR SERVICE: HCPCS | Performed by: ANESTHESIOLOGY

## 2022-07-05 PROCEDURE — 700102 HCHG RX REV CODE 250 W/ 637 OVERRIDE(OP): Performed by: ANESTHESIOLOGY

## 2022-07-05 PROCEDURE — 160009 HCHG ANES TIME/MIN: Performed by: OTOLARYNGOLOGY

## 2022-07-05 PROCEDURE — 160046 HCHG PACU - 1ST 60 MINS PHASE II: Performed by: OTOLARYNGOLOGY

## 2022-07-05 PROCEDURE — 00170 ANES INTRAORAL PX NOS: CPT | Performed by: ANESTHESIOLOGY

## 2022-07-05 PROCEDURE — 160002 HCHG RECOVERY MINUTES (STAT): Performed by: OTOLARYNGOLOGY

## 2022-07-05 PROCEDURE — RXMED WILLOW AMBULATORY MEDICATION CHARGE: Performed by: OTOLARYNGOLOGY

## 2022-07-05 PROCEDURE — 160048 HCHG OR STATISTICAL LEVEL 1-5: Performed by: OTOLARYNGOLOGY

## 2022-07-05 PROCEDURE — 160047 HCHG PACU  - EA ADDL 30 MINS PHASE II: Performed by: OTOLARYNGOLOGY

## 2022-07-05 RX ORDER — SODIUM CHLORIDE, SODIUM LACTATE, POTASSIUM CHLORIDE, CALCIUM CHLORIDE 600; 310; 30; 20 MG/100ML; MG/100ML; MG/100ML; MG/100ML
INJECTION, SOLUTION INTRAVENOUS CONTINUOUS
Status: DISCONTINUED | OUTPATIENT
Start: 2022-07-05 | End: 2022-07-05 | Stop reason: HOSPADM

## 2022-07-05 RX ORDER — OXYCODONE HCL 5 MG/5 ML
5 SOLUTION, ORAL ORAL
Status: COMPLETED | OUTPATIENT
Start: 2022-07-05 | End: 2022-07-05

## 2022-07-05 RX ORDER — MEPERIDINE HYDROCHLORIDE 25 MG/ML
INJECTION INTRAMUSCULAR; INTRAVENOUS; SUBCUTANEOUS
Status: COMPLETED
Start: 2022-07-05 | End: 2022-07-05

## 2022-07-05 RX ORDER — HYDRALAZINE HYDROCHLORIDE 20 MG/ML
5 INJECTION INTRAMUSCULAR; INTRAVENOUS
Status: DISCONTINUED | OUTPATIENT
Start: 2022-07-05 | End: 2022-07-05 | Stop reason: HOSPADM

## 2022-07-05 RX ORDER — SCOLOPAMINE TRANSDERMAL SYSTEM 1 MG/1
1 PATCH, EXTENDED RELEASE TRANSDERMAL ONCE
Status: DISCONTINUED | OUTPATIENT
Start: 2022-07-05 | End: 2022-07-05 | Stop reason: HOSPADM

## 2022-07-05 RX ORDER — KETOROLAC TROMETHAMINE 30 MG/ML
INJECTION, SOLUTION INTRAMUSCULAR; INTRAVENOUS PRN
Status: DISCONTINUED | OUTPATIENT
Start: 2022-07-05 | End: 2022-07-05 | Stop reason: SURG

## 2022-07-05 RX ORDER — HALOPERIDOL 5 MG/ML
1 INJECTION INTRAMUSCULAR
Status: DISCONTINUED | OUTPATIENT
Start: 2022-07-05 | End: 2022-07-05 | Stop reason: HOSPADM

## 2022-07-05 RX ORDER — OXYCODONE HCL 5 MG/5 ML
10 SOLUTION, ORAL ORAL
Status: COMPLETED | OUTPATIENT
Start: 2022-07-05 | End: 2022-07-05

## 2022-07-05 RX ORDER — HYDROMORPHONE HYDROCHLORIDE 1 MG/ML
0.4 INJECTION, SOLUTION INTRAMUSCULAR; INTRAVENOUS; SUBCUTANEOUS
Status: DISCONTINUED | OUTPATIENT
Start: 2022-07-05 | End: 2022-07-05 | Stop reason: HOSPADM

## 2022-07-05 RX ORDER — ALBUTEROL SULFATE 2.5 MG/3ML
2.5 SOLUTION RESPIRATORY (INHALATION)
Status: DISCONTINUED | OUTPATIENT
Start: 2022-07-05 | End: 2022-07-05 | Stop reason: HOSPADM

## 2022-07-05 RX ORDER — DEXAMETHASONE SODIUM PHOSPHATE 4 MG/ML
INJECTION, SOLUTION INTRA-ARTICULAR; INTRALESIONAL; INTRAMUSCULAR; INTRAVENOUS; SOFT TISSUE PRN
Status: DISCONTINUED | OUTPATIENT
Start: 2022-07-05 | End: 2022-07-05 | Stop reason: SURG

## 2022-07-05 RX ORDER — DIPHENHYDRAMINE HYDROCHLORIDE 50 MG/ML
12.5 INJECTION INTRAMUSCULAR; INTRAVENOUS
Status: DISCONTINUED | OUTPATIENT
Start: 2022-07-05 | End: 2022-07-05 | Stop reason: HOSPADM

## 2022-07-05 RX ORDER — HYDROMORPHONE HYDROCHLORIDE 1 MG/ML
0.1 INJECTION, SOLUTION INTRAMUSCULAR; INTRAVENOUS; SUBCUTANEOUS
Status: DISCONTINUED | OUTPATIENT
Start: 2022-07-05 | End: 2022-07-05 | Stop reason: HOSPADM

## 2022-07-05 RX ORDER — ROCURONIUM BROMIDE 10 MG/ML
INJECTION, SOLUTION INTRAVENOUS PRN
Status: DISCONTINUED | OUTPATIENT
Start: 2022-07-05 | End: 2022-07-05 | Stop reason: SURG

## 2022-07-05 RX ORDER — ACETAMINOPHEN 500 MG
1000 TABLET ORAL ONCE
Status: COMPLETED | OUTPATIENT
Start: 2022-07-05 | End: 2022-07-05

## 2022-07-05 RX ORDER — MIDAZOLAM HYDROCHLORIDE 1 MG/ML
INJECTION INTRAMUSCULAR; INTRAVENOUS PRN
Status: DISCONTINUED | OUTPATIENT
Start: 2022-07-05 | End: 2022-07-05 | Stop reason: SURG

## 2022-07-05 RX ORDER — MEPERIDINE HYDROCHLORIDE 25 MG/ML
6.25 INJECTION INTRAMUSCULAR; INTRAVENOUS; SUBCUTANEOUS
Status: DISCONTINUED | OUTPATIENT
Start: 2022-07-05 | End: 2022-07-05 | Stop reason: HOSPADM

## 2022-07-05 RX ORDER — HYDROMORPHONE HYDROCHLORIDE 1 MG/ML
0.2 INJECTION, SOLUTION INTRAMUSCULAR; INTRAVENOUS; SUBCUTANEOUS
Status: DISCONTINUED | OUTPATIENT
Start: 2022-07-05 | End: 2022-07-05 | Stop reason: HOSPADM

## 2022-07-05 RX ORDER — ONDANSETRON 2 MG/ML
4 INJECTION INTRAMUSCULAR; INTRAVENOUS
Status: COMPLETED | OUTPATIENT
Start: 2022-07-05 | End: 2022-07-05

## 2022-07-05 RX ORDER — LIDOCAINE HYDROCHLORIDE 40 MG/ML
SOLUTION TOPICAL PRN
Status: DISCONTINUED | OUTPATIENT
Start: 2022-07-05 | End: 2022-07-05 | Stop reason: SURG

## 2022-07-05 RX ORDER — LABETALOL HYDROCHLORIDE 5 MG/ML
5 INJECTION, SOLUTION INTRAVENOUS
Status: DISCONTINUED | OUTPATIENT
Start: 2022-07-05 | End: 2022-07-05 | Stop reason: HOSPADM

## 2022-07-05 RX ORDER — ONDANSETRON 2 MG/ML
INJECTION INTRAMUSCULAR; INTRAVENOUS PRN
Status: DISCONTINUED | OUTPATIENT
Start: 2022-07-05 | End: 2022-07-05 | Stop reason: SURG

## 2022-07-05 RX ADMIN — HALOPERIDOL LACTATE 1 MG: 5 INJECTION, SOLUTION INTRAMUSCULAR at 11:12

## 2022-07-05 RX ADMIN — SODIUM CHLORIDE, POTASSIUM CHLORIDE, SODIUM LACTATE AND CALCIUM CHLORIDE: 600; 310; 30; 20 INJECTION, SOLUTION INTRAVENOUS at 09:16

## 2022-07-05 RX ADMIN — SUGAMMADEX 200 MG: 100 INJECTION, SOLUTION INTRAVENOUS at 09:41

## 2022-07-05 RX ADMIN — SODIUM CHLORIDE, POTASSIUM CHLORIDE, SODIUM LACTATE AND CALCIUM CHLORIDE: 600; 310; 30; 20 INJECTION, SOLUTION INTRAVENOUS at 09:30

## 2022-07-05 RX ADMIN — FENTANYL CITRATE 50 MCG: 50 INJECTION, SOLUTION INTRAMUSCULAR; INTRAVENOUS at 10:17

## 2022-07-05 RX ADMIN — FENTANYL CITRATE 50 MCG: 50 INJECTION, SOLUTION INTRAMUSCULAR; INTRAVENOUS at 09:37

## 2022-07-05 RX ADMIN — DEXAMETHASONE SODIUM PHOSPHATE 8 MG: 4 INJECTION, SOLUTION INTRA-ARTICULAR; INTRALESIONAL; INTRAMUSCULAR; INTRAVENOUS; SOFT TISSUE at 09:29

## 2022-07-05 RX ADMIN — SCOPALAMINE 1 PATCH: 1 PATCH, EXTENDED RELEASE TRANSDERMAL at 08:30

## 2022-07-05 RX ADMIN — FENTANYL CITRATE 100 MCG: 50 INJECTION, SOLUTION INTRAMUSCULAR; INTRAVENOUS at 09:21

## 2022-07-05 RX ADMIN — MIDAZOLAM HYDROCHLORIDE 2 MG: 1 INJECTION, SOLUTION INTRAMUSCULAR; INTRAVENOUS at 09:18

## 2022-07-05 RX ADMIN — LIDOCAINE HYDROCHLORIDE 4 ML: 40 SOLUTION TOPICAL at 09:23

## 2022-07-05 RX ADMIN — OXYCODONE HYDROCHLORIDE 10 MG: 5 SOLUTION ORAL at 10:17

## 2022-07-05 RX ADMIN — ROCURONIUM BROMIDE 30 MG: 10 INJECTION, SOLUTION INTRAVENOUS at 09:22

## 2022-07-05 RX ADMIN — ONDANSETRON 4 MG: 2 INJECTION INTRAMUSCULAR; INTRAVENOUS at 09:29

## 2022-07-05 RX ADMIN — ONDANSETRON 4 MG: 2 INJECTION INTRAMUSCULAR; INTRAVENOUS at 10:17

## 2022-07-05 RX ADMIN — KETOROLAC TROMETHAMINE 30 MG: 30 INJECTION, SOLUTION INTRAMUSCULAR at 09:41

## 2022-07-05 RX ADMIN — MEPERIDINE HYDROCHLORIDE 12.5 MG: 25 INJECTION INTRAMUSCULAR; INTRAVENOUS; SUBCUTANEOUS at 09:53

## 2022-07-05 RX ADMIN — PROPOFOL 150 MG: 10 INJECTION, EMULSION INTRAVENOUS at 09:22

## 2022-07-05 RX ADMIN — ACETAMINOPHEN 1000 MG: 500 TABLET ORAL at 08:30

## 2022-07-05 ASSESSMENT — FIBROSIS 4 INDEX
FIB4 SCORE: 0.49
FIB4 SCORE: 0.49

## 2022-07-05 NOTE — OP REPORT
DATE OF SERVICE:  7/5/22    SURGEON: Isac Joseph MD    ANESTHESIOLOGIST: Lion Salazar MD     PREOPERATIVE DIAGNOSES:  1.  Chronic tonsillitis.     POSTOPERATIVE DIAGNOSES:  1.  Chronic tonsillitis.     PROCEDURE PERFORMED:  Tonsillectomy.     INDICATIONS FOR PROCEDURE:  Monthly tonsil infection for years. Risks, benefits and alternatives were discussed with the parents who gave   their informed consent.     OPERATIVE FINDINGS:  2+ tonsils bilaterally.     PROCEDURE IN DETAIL:  The patient was identified in preoperative holding area   and brought to the operating room.  She was intubated without   difficulty.  A timeout was satisfactorily completed.  A McIvor mouth gag was   placed and the hard palate was inspected for submucosal cleft palates.  A red   rubber catheter was placed through the left naris and the palate was   suspended.  The right tonsil was removed first with a Coblator on a setting of   7 and 3.  Next, the left tonsil was removed.  Hemostasis was achieved with   the Coblator. The nasopharynx was irrigated and confirmed to be hemostatic. The patient was extubated and brought to the postanesthesia care unit in good condition.     ESTIMATED BLOOD LOSS:  10 mL.     COMPLICATIONS:  None.     SPECIMENS REMOVED:  Left and right tonsils.

## 2022-07-05 NOTE — ANESTHESIA POSTPROCEDURE EVALUATION
Patient: Farideh Cunha    Procedure Summary     Date: 07/05/22 Room / Location: [facility] ROOM 24 / SURGERY SAME DAY [facility]    Anesthesia Start: 0916 Anesthesia Stop: 0951    Procedure: TONSILLECTOMY (Bilateral Mouth) Diagnosis: (CHRONIC TONSILITIS)    Surgeons: Isac Joseph M.D. Responsible Provider: Lion Salazar M.D.    Anesthesia Type: general ASA Status: 2          Final Anesthesia Type: general  Last vitals  BP   Blood Pressure: 114/68    Temp   36.4 °C (97.5 °F)    Pulse   88   Resp   20    SpO2   96 %      Anesthesia Post Evaluation    Patient location during evaluation: PACU  Patient participation: complete - patient participated  Level of consciousness: awake and alert    Airway patency: patent  Anesthetic complications: no  Cardiovascular status: hemodynamically stable  Respiratory status: acceptable  Hydration status: euvolemic    PONV: none          No complications documented.     Nurse Pain Score: 0 (NPRS)

## 2022-07-05 NOTE — DISCHARGE INSTRUCTIONS
If any questions arise, call your provider.  If your provider is not available, please feel free to call the Surgical Center at [phone].    MEDICATIONS: Resume taking daily medication.  Take prescribed pain medication with food.  If no medication is prescribed, you may take non-aspirin pain medication if needed.  PAIN MEDICATION CAN BE VERY CONSTIPATING.  Take a stool softener or laxative such as senokot, pericolace, or milk of magnesia if needed.    Last pain medication Oxycodone 10 mg given at 10:17 am    What to Expect Post Anesthesia    Rest and take it easy for the first 24 hours.  A responsible adult is recommended to remain with you during that time.  It is normal to feel sleepy.  We encourage you to not do anything that requires balance, judgment or coordination.    FOR 24 HOURS DO NOT:  Drive, operate machinery or run household appliances.  Drink beer or alcoholic beverages.  Make important decisions or sign legal documents.    To avoid nausea, slowly advance diet as tolerated, avoiding spicy or greasy foods for the first day.  Add more substantial food to your diet according to your provider's instructions.  Babies can be fed formula or breast milk as soon as they are hungry.  INCREASE FLUIDS AND FIBER TO AVOID CONSTIPATION.    MILD FLU-LIKE SYMPTOMS ARE NORMAL.  YOU MAY EXPERIENCE GENERALIZED MUSCLE ACHES, THROAT IRRITATION, HEADACHE AND/OR SOME NAUSEA.      Tonsillectomy/ Adenoidectomy Discharge Instructions    A yellowish-white membrane normally forms in the throat where the tonsils were removed. This may cause a bad odor while the throat heals. Drinking , eating and chewing gum will diminish the odor. You may also notice excess saliva(spit) in your mouth for several days following surgery.     Bleeding is a major concern after surgery and may be avoided by not gargling, hacking, coughing, clearing there throat and drinking alcoholic beverages. If bleeding occurs, suck on ice chips for several  minutes. Small amounts of blood in your saliva (spit) is normal from time to time. However, if the bleeding continues despite sucking on ice chips call your physician immediately.    ACTIVITIES: Your need for sleep with be increased for 2 to 4 weeks post surgery. It is usually advisable to rest at home for one week following surgery.     BATHING: You may shower or bathe after leaving the hospital. Avoid overly hot or steamy showers or baths. Swimming may be resumed in 2 weeks.     Ice: Ice collars to the neck may relieve some of the discomfort but the pain may last up to the fifth to seventh post surgical day.     DRIVING: You may drive whenever you are off pain medications.

## 2022-07-05 NOTE — OR NURSING
0948: Patient from OR awake and emotional via gurney to PACU. 4 L via mask. Report from the anesthesiologist and RN received. Patient's name and  verified.     0950: Patient is shivering and will medicate. SEE MAR. Warm blankets for comfort. Patient tolerating sips of water.     0957: Dr Joseph at bedside to update patient.    1003: Shivering is resolved. Patient reporting pain and will medicate.     1015: Patient tolerated  ice cream well.     1038: Pain is better. No bleeding noted.     1058: Dad Vu updated and asked to  prescription from [facility].    1110- medicated per MAR for nausea.    1202: Nausea resolved. No bleeding. VSS.     1215: Dc instructions discussed with patient. Questions answered.     1220: Patient got dressed by herself.     1225: Patient met discharge criteria.  Patient wide awake. VSS. Discussed diet, activity, medications, follow up care and worsening symptoms. Waiting for .     1237: Dr Joseph at bedside.

## 2022-07-05 NOTE — ANESTHESIA PROCEDURE NOTES
Airway    Date/Time: 7/5/2022 9:23 AM  Performed by: Lion Salazar M.D.  Authorized by: Lion Salazar M.D.     Location:  OR  Urgency:  Elective  Difficult Airway: No    Indications for Airway Management:  Anesthesia      Spontaneous Ventilation: absent    Sedation Level:  Deep  Preoxygenated: Yes    Patient Position:  Sniffing  Mask Difficulty Assessment:  2 - vent by mask + OA or adjuvant +/- NMBA  Final Airway Type:  Endotracheal airway  Final Endotracheal Airway:  ETT  Cuffed: Yes    Technique Used for Successful ETT Placement:  Direct laryngoscopy    Insertion Site:  Oral  Blade Type:  [place]  Laryngoscope Blade/Videolaryngoscope Blade Size:  3  ETT Size (mm):  7.0  Measured from:  Teeth  ETT to Teeth (cm):  20  Placement Verified by: auscultation and capnometry    Cormack-Lehane Classification:  Grade I - full view of glottis  Number of Attempts at Approach:  1   Atraumatic DLx1

## 2022-07-05 NOTE — ANESTHESIA TIME REPORT
Anesthesia Start and Stop Event Times     Date Time Event    7/5/2022 0905 Ready for Procedure     0916 Anesthesia Start     0951 Anesthesia Stop        Responsible Staff  07/05/22    Name Role Begin End    Lion Salazar M.D. Anesth 0916 0951        Overtime Reason:  no overtime (within assigned shift)    Comments:

## 2022-07-05 NOTE — ANESTHESIA PREPROCEDURE EVALUATION
Case: [id number] Date/Time: 07/05/22 0915    Procedure: TONSILLECTOMY    Pre-op diagnosis: CHRONIC TONSILITIS    Location: [facility] ROOM 24 / SURGERY SAME DAY [facility]    Surgeons: Isac Joseph M.D.          Relevant Problems   GI   (positive) GERD (gastroesophageal reflux disease)      Other   (positive) Generalized anxiety disorder with panic attacks   PONV-scope patch preop  hypothyroid    Physical Exam    Airway   Mallampati: II  TM distance: >3 FB  Neck ROM: full       Cardiovascular - normal exam  Rhythm: regular  Rate: normal  (-) murmur     Dental - normal exam           Pulmonary - normal exam  Breath sounds clear to auscultation     Abdominal    Neurological - normal exam                 Anesthesia Plan    ASA 2       Plan - general       Airway plan will be ETT          Induction: intravenous    Postoperative Plan: Postoperative administration of opioids is intended.    Pertinent diagnostic labs and testing reviewed    Informed Consent:    Anesthetic plan and risks discussed with patient.    Use of blood products discussed with: patient whom consented to blood products.

## 2022-07-25 ENCOUNTER — HOSPITAL ENCOUNTER (OUTPATIENT)
Facility: MEDICAL CENTER | Age: 32
End: 2022-07-25
Attending: UROLOGY
Payer: COMMERCIAL

## 2022-07-25 PROCEDURE — 87086 URINE CULTURE/COLONY COUNT: CPT

## 2022-07-27 LAB
BACTERIA UR CULT: NORMAL
SIGNIFICANT IND 70042: NORMAL
SITE SITE: NORMAL
SOURCE SOURCE: NORMAL

## 2022-08-12 ENCOUNTER — APPOINTMENT (OUTPATIENT)
Dept: RADIOLOGY | Facility: MEDICAL CENTER | Age: 32
End: 2022-08-12
Attending: PHYSICIAN ASSISTANT
Payer: MEDICAID

## 2022-08-16 ENCOUNTER — HOSPITAL ENCOUNTER (OUTPATIENT)
Facility: MEDICAL CENTER | Age: 32
End: 2022-08-16
Attending: UROLOGY
Payer: COMMERCIAL

## 2022-08-16 PROCEDURE — 87186 SC STD MICRODIL/AGAR DIL: CPT

## 2022-08-16 PROCEDURE — 87077 CULTURE AEROBIC IDENTIFY: CPT

## 2022-08-16 PROCEDURE — 87086 URINE CULTURE/COLONY COUNT: CPT

## 2022-08-18 LAB
BACTERIA UR CULT: ABNORMAL
BACTERIA UR CULT: ABNORMAL
SIGNIFICANT IND 70042: ABNORMAL
SITE SITE: ABNORMAL
SOURCE SOURCE: ABNORMAL

## 2022-09-12 ENCOUNTER — HOSPITAL ENCOUNTER (OUTPATIENT)
Facility: MEDICAL CENTER | Age: 32
End: 2022-09-12
Attending: PREVENTIVE MEDICINE
Payer: COMMERCIAL

## 2022-09-12 ENCOUNTER — EH NON-PROVIDER (OUTPATIENT)
Dept: OCCUPATIONAL MEDICINE | Facility: CLINIC | Age: 32
End: 2022-09-12

## 2022-09-12 ENCOUNTER — EMPLOYEE HEALTH (OUTPATIENT)
Dept: OCCUPATIONAL MEDICINE | Facility: CLINIC | Age: 32
End: 2022-09-12

## 2022-09-12 DIAGNOSIS — Z02.89 VISIT FOR OCCUPATIONAL HEALTH EXAMINATION: Primary | ICD-10-CM

## 2022-09-12 DIAGNOSIS — Z02.89 VISIT FOR OCCUPATIONAL HEALTH EXAMINATION: ICD-10-CM

## 2022-09-12 LAB
AMP AMPHETAMINE: NORMAL
BAR BARBITURATES: NORMAL
BZO BENZODIAZEPINES: NORMAL
COC COCAINE: NORMAL
INT CON NEG: NORMAL
INT CON POS: NORMAL
MDMA ECSTASY: NORMAL
MET METHAMPHETAMINES: NORMAL
MTD METHADONE: NORMAL
OPI OPIATES: NORMAL
OXY OXYCODONE: NORMAL
PCP PHENCYCLIDINE: NORMAL
POC URINE DRUG SCREEN OCDRS: NORMAL
THC: NORMAL

## 2022-09-12 PROCEDURE — 86480 TB TEST CELL IMMUN MEASURE: CPT | Performed by: PREVENTIVE MEDICINE

## 2022-09-12 PROCEDURE — 80305 DRUG TEST PRSMV DIR OPT OBS: CPT | Performed by: PREVENTIVE MEDICINE

## 2022-09-12 PROCEDURE — 86735 MUMPS ANTIBODY: CPT | Performed by: PREVENTIVE MEDICINE

## 2022-09-12 PROCEDURE — 86787 VARICELLA-ZOSTER ANTIBODY: CPT | Performed by: PREVENTIVE MEDICINE

## 2022-09-12 PROCEDURE — 86762 RUBELLA ANTIBODY: CPT | Performed by: PREVENTIVE MEDICINE

## 2022-09-12 PROCEDURE — 86765 RUBEOLA ANTIBODY: CPT | Performed by: PREVENTIVE MEDICINE

## 2022-09-12 PROCEDURE — 8915 PR COMPREHENSIVE PHYSICAL: Performed by: PREVENTIVE MEDICINE

## 2022-09-14 LAB
MEV IGG SER-ACNC: 16.6 AU/ML
MUV IGG SER IA-ACNC: 63.4 AU/ML
RUBV AB SER QL: 273 IU/ML
VZV IGG SER IA-ACNC: 3417 IV

## 2022-09-20 ENCOUNTER — APPOINTMENT (OUTPATIENT)
Dept: MEDICAL GROUP | Facility: IMAGING CENTER | Age: 32
End: 2022-09-20
Payer: COMMERCIAL

## 2022-10-12 ENCOUNTER — NON-PROVIDER VISIT (OUTPATIENT)
Dept: OCCUPATIONAL MEDICINE | Facility: CLINIC | Age: 32
End: 2022-10-12

## 2022-10-12 DIAGNOSIS — Z02.89 ENCOUNTER FOR OCCUPATIONAL HEALTH ASSESSMENT: Primary | ICD-10-CM

## 2022-10-12 PROCEDURE — 80305 DRUG TEST PRSMV DIR OPT OBS: CPT | Performed by: PREVENTIVE MEDICINE

## 2022-10-21 ENCOUNTER — HOSPITAL ENCOUNTER (OUTPATIENT)
Facility: MEDICAL CENTER | Age: 32
End: 2022-10-21
Attending: NURSE PRACTITIONER
Payer: COMMERCIAL

## 2022-10-21 ENCOUNTER — EH NON-PROVIDER (OUTPATIENT)
Dept: OCCUPATIONAL MEDICINE | Facility: CLINIC | Age: 32
End: 2022-10-21

## 2022-10-21 DIAGNOSIS — Z02.89 VISIT FOR OCCUPATIONAL HEALTH EXAMINATION: ICD-10-CM

## 2022-10-21 DIAGNOSIS — Z02.89 VISIT FOR OCCUPATIONAL HEALTH EXAMINATION: Primary | ICD-10-CM

## 2022-10-21 PROCEDURE — 86480 TB TEST CELL IMMUN MEASURE: CPT | Performed by: NURSE PRACTITIONER

## 2022-10-21 PROCEDURE — 86480 TB TEST CELL IMMUN MEASURE: CPT | Performed by: PREVENTIVE MEDICINE

## 2022-10-24 LAB
GAMMA INTERFERON BACKGROUND BLD IA-ACNC: 0.08 IU/ML
M TB IFN-G BLD-IMP: NEGATIVE
M TB IFN-G CD4+ BCKGRND COR BLD-ACNC: 0 IU/ML
MITOGEN IGNF BCKGRD COR BLD-ACNC: >10 IU/ML
QFT TB2 - NIL TBQ2: 0.01 IU/ML

## 2022-10-31 ENCOUNTER — EH NON-PROVIDER (OUTPATIENT)
Dept: OCCUPATIONAL MEDICINE | Facility: CLINIC | Age: 32
End: 2022-10-31

## 2022-11-28 DIAGNOSIS — F41.1 GENERALIZED ANXIETY DISORDER WITH PANIC ATTACKS: ICD-10-CM

## 2022-11-28 DIAGNOSIS — Z78.9 USES BIRTH CONTROL: ICD-10-CM

## 2022-11-28 DIAGNOSIS — E06.3 HASHIMOTO'S DISEASE: ICD-10-CM

## 2022-11-28 DIAGNOSIS — F41.0 GENERALIZED ANXIETY DISORDER WITH PANIC ATTACKS: ICD-10-CM

## 2022-11-28 DIAGNOSIS — G47.09 OTHER INSOMNIA: ICD-10-CM

## 2022-12-01 RX ORDER — LEVOTHYROXINE, LIOTHYRONINE 57; 13.5 UG/1; UG/1
TABLET ORAL
Qty: 90 TABLET | Refills: 3 | Status: SHIPPED | OUTPATIENT
Start: 2022-12-01 | End: 2024-01-04

## 2022-12-01 RX ORDER — TRAZODONE HYDROCHLORIDE 50 MG/1
TABLET ORAL
Qty: 30 TABLET | Refills: 0 | Status: SHIPPED | OUTPATIENT
Start: 2022-12-01 | End: 2024-02-16

## 2022-12-01 RX ORDER — HYDROXYZINE 50 MG/1
TABLET, FILM COATED ORAL
Qty: 30 TABLET | Refills: 0 | Status: SHIPPED | OUTPATIENT
Start: 2022-12-01

## 2022-12-01 RX ORDER — DROSPIRENONE AND ETHINYL ESTRADIOL 0.03MG-3MG
KIT ORAL
Qty: 84 TABLET | Refills: 3 | Status: SHIPPED | OUTPATIENT
Start: 2022-12-01

## 2024-01-04 DIAGNOSIS — E06.3 HASHIMOTO'S DISEASE: ICD-10-CM

## 2024-01-04 RX ORDER — LEVOTHYROXINE, LIOTHYRONINE 57; 13.5 UG/1; UG/1
TABLET ORAL
Qty: 30 TABLET | Refills: 3 | Status: SHIPPED | OUTPATIENT
Start: 2024-01-04 | End: 2024-02-16 | Stop reason: SDUPTHER

## 2024-01-04 NOTE — TELEPHONE ENCOUNTER
Received request via: Pharmacy    Was the patient seen in the last year in this department? No, last seen on 6/13/2022     Does the patient have an active prescription (recently filled or refills available) for medication(s) requested? No    Does the patient have shelter Plus and need 100 day supply (blood pressure, diabetes and cholesterol meds only)? Patient does not have SCP

## 2024-02-02 ENCOUNTER — APPOINTMENT (OUTPATIENT)
Dept: MEDICAL GROUP | Facility: CLINIC | Age: 34
End: 2024-02-02
Payer: COMMERCIAL

## 2024-02-13 SDOH — HEALTH STABILITY: PHYSICAL HEALTH: ON AVERAGE, HOW MANY MINUTES DO YOU ENGAGE IN EXERCISE AT THIS LEVEL?: 30 MIN

## 2024-02-13 SDOH — ECONOMIC STABILITY: INCOME INSECURITY: IN THE LAST 12 MONTHS, WAS THERE A TIME WHEN YOU WERE NOT ABLE TO PAY THE MORTGAGE OR RENT ON TIME?: PATIENT REFUSED

## 2024-02-13 SDOH — HEALTH STABILITY: MENTAL HEALTH
STRESS IS WHEN SOMEONE FEELS TENSE, NERVOUS, ANXIOUS, OR CAN'T SLEEP AT NIGHT BECAUSE THEIR MIND IS TROUBLED. HOW STRESSED ARE YOU?: NOT AT ALL

## 2024-02-13 SDOH — HEALTH STABILITY: PHYSICAL HEALTH: ON AVERAGE, HOW MANY DAYS PER WEEK DO YOU ENGAGE IN MODERATE TO STRENUOUS EXERCISE (LIKE A BRISK WALK)?: 2 DAYS

## 2024-02-13 SDOH — ECONOMIC STABILITY: TRANSPORTATION INSECURITY
IN THE PAST 12 MONTHS, HAS LACK OF TRANSPORTATION KEPT YOU FROM MEETINGS, WORK, OR FROM GETTING THINGS NEEDED FOR DAILY LIVING?: PATIENT DECLINED

## 2024-02-13 SDOH — ECONOMIC STABILITY: HOUSING INSECURITY
IN THE LAST 12 MONTHS, WAS THERE A TIME WHEN YOU DID NOT HAVE A STEADY PLACE TO SLEEP OR SLEPT IN A SHELTER (INCLUDING NOW)?: PATIENT REFUSED

## 2024-02-13 SDOH — ECONOMIC STABILITY: INCOME INSECURITY: HOW HARD IS IT FOR YOU TO PAY FOR THE VERY BASICS LIKE FOOD, HOUSING, MEDICAL CARE, AND HEATING?: SOMEWHAT HARD

## 2024-02-13 SDOH — ECONOMIC STABILITY: TRANSPORTATION INSECURITY
IN THE PAST 12 MONTHS, HAS THE LACK OF TRANSPORTATION KEPT YOU FROM MEDICAL APPOINTMENTS OR FROM GETTING MEDICATIONS?: PATIENT DECLINED

## 2024-02-13 SDOH — ECONOMIC STABILITY: TRANSPORTATION INSECURITY
IN THE PAST 12 MONTHS, HAS LACK OF RELIABLE TRANSPORTATION KEPT YOU FROM MEDICAL APPOINTMENTS, MEETINGS, WORK OR FROM GETTING THINGS NEEDED FOR DAILY LIVING?: PATIENT DECLINED

## 2024-02-13 SDOH — ECONOMIC STABILITY: HOUSING INSECURITY

## 2024-02-13 SDOH — ECONOMIC STABILITY: FOOD INSECURITY: WITHIN THE PAST 12 MONTHS, YOU WORRIED THAT YOUR FOOD WOULD RUN OUT BEFORE YOU GOT MONEY TO BUY MORE.: PATIENT DECLINED

## 2024-02-13 SDOH — ECONOMIC STABILITY: FOOD INSECURITY: WITHIN THE PAST 12 MONTHS, THE FOOD YOU BOUGHT JUST DIDN'T LAST AND YOU DIDN'T HAVE MONEY TO GET MORE.: PATIENT DECLINED

## 2024-02-13 ASSESSMENT — LIFESTYLE VARIABLES
HOW OFTEN DO YOU HAVE A DRINK CONTAINING ALCOHOL: PATIENT DECLINED
SKIP TO QUESTIONS 9-10: 0
HOW OFTEN DO YOU HAVE SIX OR MORE DRINKS ON ONE OCCASION: PATIENT DECLINED
HOW MANY STANDARD DRINKS CONTAINING ALCOHOL DO YOU HAVE ON A TYPICAL DAY: PATIENT DECLINED
AUDIT-C TOTAL SCORE: -1

## 2024-02-13 ASSESSMENT — SOCIAL DETERMINANTS OF HEALTH (SDOH)
HOW OFTEN DO YOU HAVE SIX OR MORE DRINKS ON ONE OCCASION: PATIENT DECLINED
HOW OFTEN DO YOU GET TOGETHER WITH FRIENDS OR RELATIVES?: MORE THAN THREE TIMES A WEEK
IN A TYPICAL WEEK, HOW MANY TIMES DO YOU TALK ON THE PHONE WITH FAMILY, FRIENDS, OR NEIGHBORS?: MORE THAN THREE TIMES A WEEK
HOW MANY DRINKS CONTAINING ALCOHOL DO YOU HAVE ON A TYPICAL DAY WHEN YOU ARE DRINKING: PATIENT DECLINED
WITHIN THE PAST 12 MONTHS, YOU WORRIED THAT YOUR FOOD WOULD RUN OUT BEFORE YOU GOT THE MONEY TO BUY MORE: PATIENT DECLINED
HOW OFTEN DO YOU ATTEND CHURCH OR RELIGIOUS SERVICES?: NEVER
HOW HARD IS IT FOR YOU TO PAY FOR THE VERY BASICS LIKE FOOD, HOUSING, MEDICAL CARE, AND HEATING?: SOMEWHAT HARD
DO YOU BELONG TO ANY CLUBS OR ORGANIZATIONS SUCH AS CHURCH GROUPS UNIONS, FRATERNAL OR ATHLETIC GROUPS, OR SCHOOL GROUPS?: NO
IN A TYPICAL WEEK, HOW MANY TIMES DO YOU TALK ON THE PHONE WITH FAMILY, FRIENDS, OR NEIGHBORS?: MORE THAN THREE TIMES A WEEK
HOW OFTEN DO YOU ATTENT MEETINGS OF THE CLUB OR ORGANIZATION YOU BELONG TO?: NEVER
HOW OFTEN DO YOU ATTEND CHURCH OR RELIGIOUS SERVICES?: NEVER
HOW OFTEN DO YOU GET TOGETHER WITH FRIENDS OR RELATIVES?: MORE THAN THREE TIMES A WEEK
DO YOU BELONG TO ANY CLUBS OR ORGANIZATIONS SUCH AS CHURCH GROUPS UNIONS, FRATERNAL OR ATHLETIC GROUPS, OR SCHOOL GROUPS?: NO
HOW OFTEN DO YOU HAVE A DRINK CONTAINING ALCOHOL: PATIENT DECLINED
HOW OFTEN DO YOU ATTENT MEETINGS OF THE CLUB OR ORGANIZATION YOU BELONG TO?: NEVER

## 2024-02-16 ENCOUNTER — OFFICE VISIT (OUTPATIENT)
Dept: MEDICAL GROUP | Facility: CLINIC | Age: 34
End: 2024-02-16
Payer: COMMERCIAL

## 2024-02-16 VITALS
BODY MASS INDEX: 23.16 KG/M2 | DIASTOLIC BLOOD PRESSURE: 79 MMHG | WEIGHT: 139 LBS | HEIGHT: 65 IN | OXYGEN SATURATION: 93 % | HEART RATE: 93 BPM | TEMPERATURE: 98.1 F | SYSTOLIC BLOOD PRESSURE: 109 MMHG

## 2024-02-16 DIAGNOSIS — G47.09 OTHER INSOMNIA: ICD-10-CM

## 2024-02-16 DIAGNOSIS — E06.3 HASHIMOTO'S DISEASE: ICD-10-CM

## 2024-02-16 DIAGNOSIS — Z11.3 ROUTINE SCREENING FOR STI (SEXUALLY TRANSMITTED INFECTION): ICD-10-CM

## 2024-02-16 DIAGNOSIS — R53.83 OTHER FATIGUE: ICD-10-CM

## 2024-02-16 PROCEDURE — 99213 OFFICE O/P EST LOW 20 MIN: CPT | Mod: GE | Performed by: STUDENT IN AN ORGANIZED HEALTH CARE EDUCATION/TRAINING PROGRAM

## 2024-02-16 PROCEDURE — 3074F SYST BP LT 130 MM HG: CPT | Mod: GC | Performed by: STUDENT IN AN ORGANIZED HEALTH CARE EDUCATION/TRAINING PROGRAM

## 2024-02-16 PROCEDURE — 3078F DIAST BP <80 MM HG: CPT | Mod: GC | Performed by: STUDENT IN AN ORGANIZED HEALTH CARE EDUCATION/TRAINING PROGRAM

## 2024-02-16 RX ORDER — LEVOTHYROXINE, LIOTHYRONINE 57; 13.5 UG/1; UG/1
90 TABLET ORAL DAILY
Qty: 30 TABLET | Refills: 3 | Status: SHIPPED | OUTPATIENT
Start: 2024-02-16 | End: 2024-02-29 | Stop reason: SDUPTHER

## 2024-02-16 ASSESSMENT — ANXIETY QUESTIONNAIRES
GAD7 TOTAL SCORE: 12
1. FEELING NERVOUS, ANXIOUS, OR ON EDGE: NEARLY EVERY DAY
7. FEELING AFRAID AS IF SOMETHING AWFUL MIGHT HAPPEN: NOT AT ALL
5. BEING SO RESTLESS THAT IT IS HARD TO SIT STILL: NOT AT ALL
2. NOT BEING ABLE TO STOP OR CONTROL WORRYING: SEVERAL DAYS
6. BECOMING EASILY ANNOYED OR IRRITABLE: MORE THAN HALF THE DAYS
4. TROUBLE RELAXING: NEARLY EVERY DAY
3. WORRYING TOO MUCH ABOUT DIFFERENT THINGS: NEARLY EVERY DAY

## 2024-02-16 ASSESSMENT — PATIENT HEALTH QUESTIONNAIRE - PHQ9
5. POOR APPETITE OR OVEREATING: 1 - SEVERAL DAYS
CLINICAL INTERPRETATION OF PHQ2 SCORE: 0

## 2024-02-16 ASSESSMENT — FIBROSIS 4 INDEX: FIB4 SCORE: 0.51

## 2024-02-16 NOTE — ASSESSMENT & PLAN NOTE
CBC, CMP, A1c, TSH with reflex T4 ordered.  JANINE-7 12  PHQ-9 7  Patient to defer starting SSRI today and to instead assess whether her anxiety and depression are the results of her Hashimoto thyroiditis  Patient to return in approximately 1 month to review labs
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
## 2024-02-29 DIAGNOSIS — E06.3 HASHIMOTO'S DISEASE: ICD-10-CM

## 2024-02-29 RX ORDER — LEVOTHYROXINE, LIOTHYRONINE 57; 13.5 UG/1; UG/1
90 TABLET ORAL DAILY
Qty: 90 EACH | Refills: 0 | Status: SHIPPED | OUTPATIENT
Start: 2024-02-29 | End: 2024-05-29

## 2025-05-09 ENCOUNTER — APPOINTMENT (OUTPATIENT)
Dept: MEDICAL GROUP | Facility: CLINIC | Age: 35
End: 2025-05-09
Payer: COMMERCIAL

## 2025-05-16 ENCOUNTER — OFFICE VISIT (OUTPATIENT)
Dept: MEDICAL GROUP | Facility: CLINIC | Age: 35
End: 2025-05-16
Payer: COMMERCIAL

## 2025-05-16 VITALS
TEMPERATURE: 98 F | SYSTOLIC BLOOD PRESSURE: 108 MMHG | OXYGEN SATURATION: 96 % | DIASTOLIC BLOOD PRESSURE: 74 MMHG | HEIGHT: 65 IN | WEIGHT: 139 LBS | BODY MASS INDEX: 23.16 KG/M2 | HEART RATE: 95 BPM

## 2025-05-16 DIAGNOSIS — K21.9 GASTROESOPHAGEAL REFLUX DISEASE WITHOUT ESOPHAGITIS: ICD-10-CM

## 2025-05-16 DIAGNOSIS — Z78.9 USES BIRTH CONTROL: ICD-10-CM

## 2025-05-16 DIAGNOSIS — E06.3 HASHIMOTO'S DISEASE: Primary | ICD-10-CM

## 2025-05-16 PROCEDURE — 3078F DIAST BP <80 MM HG: CPT | Mod: GE

## 2025-05-16 PROCEDURE — 99213 OFFICE O/P EST LOW 20 MIN: CPT | Mod: GE

## 2025-05-16 PROCEDURE — 3074F SYST BP LT 130 MM HG: CPT | Mod: GE

## 2025-05-16 RX ORDER — THYROID 30 MG/1
90 TABLET ORAL DAILY
COMMUNITY
End: 2025-05-16 | Stop reason: ALTCHOICE

## 2025-05-16 RX ORDER — THYROID 90 MG/1
90 TABLET ORAL DAILY
Qty: 90 EACH | Refills: 3 | Status: SHIPPED | OUTPATIENT
Start: 2025-05-16

## 2025-05-16 RX ORDER — OMEPRAZOLE 10 MG/1
10 CAPSULE, DELAYED RELEASE ORAL
Qty: 180 CAPSULE | Refills: 3 | Status: SHIPPED | OUTPATIENT
Start: 2025-05-16

## 2025-05-16 RX ORDER — OMEPRAZOLE 20 MG/1
10 CAPSULE, DELAYED RELEASE ORAL DAILY
COMMUNITY
End: 2025-05-16 | Stop reason: DRUGHIGH

## 2025-05-16 RX ORDER — DROSPIRENONE AND ETHINYL ESTRADIOL 0.03MG-3MG
1 KIT ORAL DAILY
Qty: 84 TABLET | Refills: 3 | Status: SHIPPED | OUTPATIENT
Start: 2025-05-16

## 2025-05-16 SDOH — HEALTH STABILITY: PHYSICAL HEALTH: ON AVERAGE, HOW MANY MINUTES DO YOU ENGAGE IN EXERCISE AT THIS LEVEL?: 20 MIN

## 2025-05-16 SDOH — ECONOMIC STABILITY: HOUSING INSECURITY
IN THE LAST 12 MONTHS, WAS THERE A TIME WHEN YOU DID NOT HAVE A STEADY PLACE TO SLEEP OR SLEPT IN A SHELTER (INCLUDING NOW)?: NO

## 2025-05-16 SDOH — ECONOMIC STABILITY: TRANSPORTATION INSECURITY
IN THE PAST 12 MONTHS, HAS THE LACK OF TRANSPORTATION KEPT YOU FROM MEDICAL APPOINTMENTS OR FROM GETTING MEDICATIONS?: NO

## 2025-05-16 SDOH — ECONOMIC STABILITY: TRANSPORTATION INSECURITY
IN THE PAST 12 MONTHS, HAS LACK OF TRANSPORTATION KEPT YOU FROM MEETINGS, WORK, OR FROM GETTING THINGS NEEDED FOR DAILY LIVING?: NO

## 2025-05-16 SDOH — ECONOMIC STABILITY: FOOD INSECURITY: WITHIN THE PAST 12 MONTHS, THE FOOD YOU BOUGHT JUST DIDN'T LAST AND YOU DIDN'T HAVE MONEY TO GET MORE.: NEVER TRUE

## 2025-05-16 SDOH — ECONOMIC STABILITY: INCOME INSECURITY: IN THE LAST 12 MONTHS, WAS THERE A TIME WHEN YOU WERE NOT ABLE TO PAY THE MORTGAGE OR RENT ON TIME?: NO

## 2025-05-16 SDOH — ECONOMIC STABILITY: INCOME INSECURITY: HOW HARD IS IT FOR YOU TO PAY FOR THE VERY BASICS LIKE FOOD, HOUSING, MEDICAL CARE, AND HEATING?: HARD

## 2025-05-16 SDOH — HEALTH STABILITY: MENTAL HEALTH
STRESS IS WHEN SOMEONE FEELS TENSE, NERVOUS, ANXIOUS, OR CAN'T SLEEP AT NIGHT BECAUSE THEIR MIND IS TROUBLED. HOW STRESSED ARE YOU?: PATIENT DECLINED

## 2025-05-16 SDOH — HEALTH STABILITY: PHYSICAL HEALTH: ON AVERAGE, HOW MANY DAYS PER WEEK DO YOU ENGAGE IN MODERATE TO STRENUOUS EXERCISE (LIKE A BRISK WALK)?: 2 DAYS

## 2025-05-16 SDOH — ECONOMIC STABILITY: FOOD INSECURITY: WITHIN THE PAST 12 MONTHS, YOU WORRIED THAT YOUR FOOD WOULD RUN OUT BEFORE YOU GOT MONEY TO BUY MORE.: NEVER TRUE

## 2025-05-16 SDOH — ECONOMIC STABILITY: TRANSPORTATION INSECURITY
IN THE PAST 12 MONTHS, HAS LACK OF RELIABLE TRANSPORTATION KEPT YOU FROM MEDICAL APPOINTMENTS, MEETINGS, WORK OR FROM GETTING THINGS NEEDED FOR DAILY LIVING?: NO

## 2025-05-16 ASSESSMENT — LIFESTYLE VARIABLES
HOW OFTEN DO YOU HAVE SIX OR MORE DRINKS ON ONE OCCASION: NEVER
HOW OFTEN DO YOU HAVE A DRINK CONTAINING ALCOHOL: 2-4 TIMES A MONTH
HOW MANY STANDARD DRINKS CONTAINING ALCOHOL DO YOU HAVE ON A TYPICAL DAY: 1 OR 2
AUDIT-C TOTAL SCORE: 2
SKIP TO QUESTIONS 9-10: 1

## 2025-05-16 ASSESSMENT — SOCIAL DETERMINANTS OF HEALTH (SDOH)
HOW HARD IS IT FOR YOU TO PAY FOR THE VERY BASICS LIKE FOOD, HOUSING, MEDICAL CARE, AND HEATING?: HARD
WITHIN THE PAST 12 MONTHS, YOU WORRIED THAT YOUR FOOD WOULD RUN OUT BEFORE YOU GOT THE MONEY TO BUY MORE: NEVER TRUE
IN THE PAST 12 MONTHS, HAS THE ELECTRIC, GAS, OIL, OR WATER COMPANY THREATENED TO SHUT OFF SERVICE IN YOUR HOME?: NO
HOW OFTEN DO YOU HAVE A DRINK CONTAINING ALCOHOL: 2-4 TIMES A MONTH
IN A TYPICAL WEEK, HOW MANY TIMES DO YOU TALK ON THE PHONE WITH FAMILY, FRIENDS, OR NEIGHBORS?: MORE THAN THREE TIMES A WEEK
HOW OFTEN DO YOU ATTEND CHURCH OR RELIGIOUS SERVICES?: PATIENT DECLINED
DO YOU BELONG TO ANY CLUBS OR ORGANIZATIONS SUCH AS CHURCH GROUPS UNIONS, FRATERNAL OR ATHLETIC GROUPS, OR SCHOOL GROUPS?: NO
HOW MANY DRINKS CONTAINING ALCOHOL DO YOU HAVE ON A TYPICAL DAY WHEN YOU ARE DRINKING: 1 OR 2
HOW OFTEN DO YOU ATTEND CHURCH OR RELIGIOUS SERVICES?: PATIENT DECLINED
HOW OFTEN DO YOU ATTENT MEETINGS OF THE CLUB OR ORGANIZATION YOU BELONG TO?: NEVER
DO YOU BELONG TO ANY CLUBS OR ORGANIZATIONS SUCH AS CHURCH GROUPS UNIONS, FRATERNAL OR ATHLETIC GROUPS, OR SCHOOL GROUPS?: NO
IN A TYPICAL WEEK, HOW MANY TIMES DO YOU TALK ON THE PHONE WITH FAMILY, FRIENDS, OR NEIGHBORS?: MORE THAN THREE TIMES A WEEK
HOW OFTEN DO YOU HAVE SIX OR MORE DRINKS ON ONE OCCASION: NEVER
HOW OFTEN DO YOU ATTENT MEETINGS OF THE CLUB OR ORGANIZATION YOU BELONG TO?: NEVER
HOW OFTEN DO YOU GET TOGETHER WITH FRIENDS OR RELATIVES?: THREE TIMES A WEEK
HOW OFTEN DO YOU GET TOGETHER WITH FRIENDS OR RELATIVES?: THREE TIMES A WEEK

## 2025-05-16 ASSESSMENT — PATIENT HEALTH QUESTIONNAIRE - PHQ9: CLINICAL INTERPRETATION OF PHQ2 SCORE: 0

## 2025-05-16 ASSESSMENT — FIBROSIS 4 INDEX: FIB4 SCORE: 0.56

## 2025-05-16 NOTE — PROGRESS NOTES
SUBJECTIVE:     CC:   Chief Complaint   Patient presents with    [facility]     Medication refills       HPI:   Farideh presents today requesting medication refills. Patient reports that she had her pap smear completed on March 14th with Markos HIDALGO, and result was normal. Patient refuses varicella vaccines and other vaccines today. She reports no other concerns today.      Past Medical History:  Past Medical History:   Diagnosis Date    Anesthesia     postop n/v    Anxiety     Depression     GERD (gastroesophageal reflux disease)     Panic disorder     PONV (postoperative nausea and vomiting)     Thyroid disease     hypothyroid    Urinary tract infection     reports chronic UTI's     Patient Active Problem List:  Patient Active Problem List   Diagnosis    Hashimoto's disease    GERD (gastroesophageal reflux disease)    Other fatigue    Other insomnia    Generalized anxiety disorder with panic attacks    Chronic urinary bladder pain    Vaginal discharge    LGSIL of cervix of undetermined significance    HPV (human papilloma virus) infection    Routine screening for STI (sexually transmitted infection)       Surgical History:  Past Surgical History:   Procedure Laterality Date    TONSILLECTOMY Bilateral 7/5/2022    Procedure: TONSILLECTOMY;  Surgeon: Isac Joseph M.D.;  Location: SURGERY SAME DAY [facility];  Service: Ent    CHOLECYSTECTOMY      MAMMOPLASTY AUGMENTATION       Family History:  Family History   Problem Relation Age of Onset    Hyperlipidemia Mother     Anxiety disorder Mother     Alcohol abuse Mother     Depression Father     Diabetes Maternal Grandfather     Diabetes Paternal Grandmother     Diabetes Paternal Grandfather     Cancer Neg Hx     Heart Disease Neg Hx     Stroke Neg Hx     Alcohol/Drug Neg Hx      Social History:  Social History     Tobacco Use    Smoking status: Never    Smokeless tobacco: Never   Vaping Use    Vaping status: Every Day    Substances: Nicotine   Substance Use  "Topics    Alcohol use: Yes     Comment: 4 drinks per week    Drug use: No     Comment: CBD use for bladder pain       Medications:  Current Outpatient Medications on File Prior to Visit   Medication Sig Dispense Refill    hydrOXYzine HCl (ATARAX) 50 MG Tab TAKE ONE TABLET BY MOUTH EVERY NIGHT AT BEDTIME (Patient not taking: Reported on 5/16/2025) 30 Tablet 0    fluticasone (FLONASE) 50 MCG/ACT nasal spray Administer 1 Spray into affected nostril(S) every day. (Patient not taking: Reported on 5/16/2025) 16 g 2     No current facility-administered medications on file prior to visit.     Allergies   Allergen Reactions    Bactrim [Sulfamethoxazole W-Trimethoprim] Swelling     tounge swelling          ROS:   Gen: no fevers/chills, no changes in weight  Eyes: no changes in vision  ENT: no changes in hearing  Pulm: no sob, no cough  CV: no chest pain, no palpitations  GI: no nausea/vomiting, no diarrhea  MSk: no myalgias  Skin: no rash  Neuro: no headaches, no numbness/tingling      OBJECTIVE:     Exam:  /74 (BP Location: Right arm, Patient Position: Sitting)   Pulse 95   Temp 36.7 °C (98 °F)   Ht 1.651 m (5' 5\")   Wt 63 kg (139 lb)   SpO2 96%   BMI 23.13 kg/m²  Body mass index is 23.13 kg/m².    Gen: Alert and oriented, No apparent distress.  Head:  NCAT, EOMI, sclera clear without discharge  Lungs: Normal effort, CTA bilaterally, no wheezes, rhonchi, or rales  CV: Regular rate and rhythm. No murmurs, rubs, or gallops.  Abd:   Non-distended, soft  Ext: No clubbing, cyanosis, edema.  MSK: Unassisted gait  Psych: normal mood and affect      ASSESSMENT & PLAN:     34 y.o. female with the following -    Problem List Items Addressed This Visit       Hashimoto's disease - Primary  Last TSH was 0.848 from 2/22/24. Patient requesting medication refill    Relevant Medications    thyroid (NP THYROID) 90 MG Tab    Other Relevant Orders    TSH WITH REFLEX TO FT4        GERD (gastroesophageal reflux disease)  Patient " requesting medication refill.    Relevant Medications    omeprazole (PRILOSEC) 10 MG CAPSULE DELAYED RELEASE      Uses birth control   Patient requesting medication refill.       Relevant Medications    drospirenone-ethinyl estradiol (URSULA) 3-0.03 MG per tablet       Imer Shaffer, PGY-3  [facility]

## 2025-08-26 ENCOUNTER — APPOINTMENT (OUTPATIENT)
Dept: INTERNAL MEDICINE | Facility: OTHER | Age: 35
End: 2025-08-26
Payer: COMMERCIAL

## (undated) DEVICE — WATER IRRIGATION STERILE 1000ML (12EA/CA)

## (undated) DEVICE — SODIUM CHL IRRIGATION 0.9% 1000ML (12EA/CA)

## (undated) DEVICE — SET LEADWIRE 5 LEAD BEDSIDE DISPOSABLE ECG (1SET OF 5/EA)

## (undated) DEVICE — CANISTER SUCTION RIGID RED 1500CC (40EA/CA)

## (undated) DEVICE — SUCTION INSTRUMENT YANKAUER BULBOUS TIP W/O VENT (50EA/CA)

## (undated) DEVICE — ELECTRODE 850 FOAM ADHESIVE - HYDROGEL RADIOTRNSPRNT (50/PK)

## (undated) DEVICE — LACTATED RINGERS INJ 1000 ML - (14EA/CA 60CA/PF)

## (undated) DEVICE — PACK ENT OR - (2EA/CA)

## (undated) DEVICE — PROTECTOR ULNA NERVE - (36PR/CA)

## (undated) DEVICE — BLANKET PEDIATRIC LARGE FULL ACCESS (10EA/CA)

## (undated) DEVICE — KIT ANESTHESIA W/CIRCUIT & 3/LT BAG W/FILTER (20EA/CA)

## (undated) DEVICE — COBLATOR PROCISE MXP II

## (undated) DEVICE — SET EXTENSION WITH 2 PORTS (48EA/CA) ***PART #2C8610 IS A SUBSTITUTE*****

## (undated) DEVICE — CANISTER SUCTION 3000ML MECHANICAL FILTER AUTO SHUTOFF MEDI-VAC NONSTERILE LF DISP  (40EA/CA)

## (undated) DEVICE — CIRCUIT VENTILATOR PEDIATRIC WITH FILTER  (20EA/CS)

## (undated) DEVICE — ELECTRODE DUAL RETURN W/ CORD - (50/PK)

## (undated) DEVICE — GLOVE SZ 7.5 BIOGEL PI MICRO - PF LF (50PR/BX)

## (undated) DEVICE — KIT  I.V. START (100EA/CA)

## (undated) DEVICE — Device

## (undated) DEVICE — SENSOR OXIMETER ADULT SPO2 RD SET (20EA/BX)

## (undated) DEVICE — MASK ANESTHESIA ADULT  - (100/CA)

## (undated) DEVICE — ANTI-FOG SOLUTION - 60BTL/CA

## (undated) DEVICE — TUBE CONNECTING SUCTION - CLEAR PLASTIC STERILE 72 IN (50EA/CA)

## (undated) DEVICE — CATHETER IV 20 GA X 1-1/4 ---SURG.& SDS ONLY--- (50EA/BX)

## (undated) DEVICE — TOWEL STOP TIMEOUT SAFETY FLAG (40EA/CA)

## (undated) DEVICE — GLOVE BIOGEL PI INDICATOR SZ 7.5 SURGICAL PF LF -(50/BX 4BX/CA)

## (undated) DEVICE — SPONGE TONSIL LARGE XRAY STERILE - (5/PK 20PK/CA)